# Patient Record
Sex: MALE | Race: WHITE | NOT HISPANIC OR LATINO | Employment: FULL TIME | URBAN - METROPOLITAN AREA
[De-identification: names, ages, dates, MRNs, and addresses within clinical notes are randomized per-mention and may not be internally consistent; named-entity substitution may affect disease eponyms.]

---

## 2024-10-07 NOTE — PROGRESS NOTES
"NPV     HISTORY OF PRESENT ILLNESS:   Skyler Shelton is a 44 y.o. male who is being seen today for renal mass found on abdominal US on 9/11/24 demonstrating a left 2.7 cm cystic lesion with possible peripheral solid component.   PAST MEDICAL HISTORY:  No past medical history on file.    PAST SURGICAL HISTORY:  No past surgical history on file.     ALLERGIES:   Not on File     MEDICATIONS:   No current outpatient medications     PHYSICAL EXAM:  There were no vitals taken for this visit.  Constitutional: Patient appears well-developed and well-nourished. No distress.    Pulmonary/Chest: Effort normal. No respiratory distress.   Abdominal: Soft, ND NT  : WNL  Musculoskeletal: Normal range of motion.    Neurological: Alert and oriented to person, place, and time.  Psychiatric: Normal mood and affect. Behavior is normal. Thought content normal.      Labs:  No results found for: \"TESTOSTERONE\"  No results found for: \"PSA\"  No components found for: \"CBC\"  No results found for: \"CREATININE\"  No components found for: \"TESTOTMS\"  No results found for: \"TESTF\"    Imaging: Renal CT on 9/19/24 demonstrated 3.3 cm Bosniak 4 cystic and solid mass arising from the posterior medial  interpolar right renal cortex concerning for solid renal neoplasm.  No evidence of adrenal or renal vein involvement.    2.8 cm Bosniak 2F cystic lesion arising from the superior pole of the left kidney.  No lymphadenopathy within the abdomen. (Upon reviewing imaging from [facility] with patient , right kidney was found to be totally normal and both Bosniak 4 mass and Bosniak 2F cyst were on the left kidney. We will call the radiologist and discuss this)     Discussion: Results reviewed with patient. Patient reassured. We discussed treatment options of surgery and cryoablation vs monitoring the cyst. Given size of the cyst, also told him it is safe to monitor. Alternatively suggested we can biopsy the growth. R/b/a's of each option dicussed with " patient and family. Patient elects to proceed with left partial nephrectomy, explained. R/b/a's discussed. Denies bloodthinners, denies HTN and diabetes. Patient verbalizes understanding and would like to be scheduled on     Assessment:    No diagnosis found.     NPV for left renal mass  Plan:   Schedule left open partial nephrectomy   All questions and concerns were addressed. Patient verbalizes understanding and has no other questions at this time.     Scribe Attestation  By signing my name below, I, Ludy Padilla , Scribe   attest that this documentation has been prepared under the direction and in the presence of Baldo Shannon MD.

## 2024-10-10 ENCOUNTER — OFFICE VISIT (OUTPATIENT)
Dept: UROLOGY | Facility: HOSPITAL | Age: 44
End: 2024-10-10
Payer: COMMERCIAL

## 2024-10-10 DIAGNOSIS — N28.89 LEFT RENAL MASS: Primary | ICD-10-CM

## 2024-10-10 PROCEDURE — 99204 OFFICE O/P NEW MOD 45 MIN: CPT | Performed by: UROLOGY

## 2024-10-10 PROCEDURE — G2211 COMPLEX E/M VISIT ADD ON: HCPCS | Performed by: UROLOGY

## 2024-10-10 PROCEDURE — 99214 OFFICE O/P EST MOD 30 MIN: CPT | Performed by: UROLOGY

## 2024-10-10 RX ORDER — CHLORHEXIDINE GLUCONATE 40 MG/ML
SOLUTION TOPICAL DAILY PRN
OUTPATIENT
Start: 2024-10-10

## 2024-10-10 RX ORDER — GABAPENTIN 300 MG/1
300 CAPSULE ORAL ONCE
OUTPATIENT
Start: 2024-10-10 | End: 2024-10-10

## 2024-10-10 RX ORDER — HEPARIN SODIUM 5000 [USP'U]/ML
5000 INJECTION, SOLUTION INTRAVENOUS; SUBCUTANEOUS ONCE
OUTPATIENT
Start: 2024-10-10 | End: 2024-10-10

## 2024-10-10 RX ORDER — CELECOXIB 400 MG/1
400 CAPSULE ORAL ONCE
OUTPATIENT
Start: 2024-10-10 | End: 2024-10-10

## 2024-10-10 RX ORDER — CIPROFLOXACIN 2 MG/ML
400 INJECTION, SOLUTION INTRAVENOUS ONCE
OUTPATIENT
Start: 2024-10-10 | End: 2024-10-10

## 2024-10-10 RX ORDER — ACETAMINOPHEN 325 MG/1
975 TABLET ORAL ONCE
OUTPATIENT
Start: 2024-10-10 | End: 2024-10-10

## 2024-10-10 RX ORDER — VANCOMYCIN HYDROCHLORIDE 1 G/200ML
1000 INJECTION, SOLUTION INTRAVENOUS ONCE
OUTPATIENT
Start: 2024-10-10 | End: 2024-10-10

## 2024-10-15 ENCOUNTER — HOSPITAL ENCOUNTER (OUTPATIENT)
Dept: RADIOLOGY | Facility: HOSPITAL | Age: 44
Discharge: HOME | End: 2024-10-15
Payer: COMMERCIAL

## 2024-10-15 ENCOUNTER — PRE-ADMISSION TESTING (OUTPATIENT)
Dept: PREADMISSION TESTING | Facility: HOSPITAL | Age: 44
End: 2024-10-15
Payer: COMMERCIAL

## 2024-10-15 ENCOUNTER — HOSPITAL ENCOUNTER (OUTPATIENT)
Dept: CARDIOLOGY | Facility: HOSPITAL | Age: 44
Discharge: HOME | End: 2024-10-15
Payer: COMMERCIAL

## 2024-10-15 VITALS
WEIGHT: 244.93 LBS | OXYGEN SATURATION: 98 % | HEIGHT: 68 IN | HEART RATE: 93 BPM | SYSTOLIC BLOOD PRESSURE: 117 MMHG | TEMPERATURE: 97.9 F | BODY MASS INDEX: 37.12 KG/M2 | DIASTOLIC BLOOD PRESSURE: 79 MMHG

## 2024-10-15 DIAGNOSIS — N28.89 LEFT RENAL MASS: ICD-10-CM

## 2024-10-15 LAB
ABO GROUP (TYPE) IN BLOOD: NORMAL
ANION GAP SERPL CALC-SCNC: 13 MMOL/L (ref 10–20)
ANTIBODY SCREEN: NORMAL
APPEARANCE UR: CLEAR
APTT PPP: 31 SECONDS (ref 27–38)
BILIRUB UR STRIP.AUTO-MCNC: NEGATIVE MG/DL
BUN SERPL-MCNC: 14 MG/DL (ref 6–23)
CALCIUM SERPL-MCNC: 10.1 MG/DL (ref 8.6–10.6)
CHLORIDE SERPL-SCNC: 99 MMOL/L (ref 98–107)
CO2 SERPL-SCNC: 29 MMOL/L (ref 21–32)
COLOR UR: NORMAL
CREAT SERPL-MCNC: 1.01 MG/DL (ref 0.5–1.3)
EGFRCR SERPLBLD CKD-EPI 2021: >90 ML/MIN/1.73M*2
ERYTHROCYTE [DISTWIDTH] IN BLOOD BY AUTOMATED COUNT: 12.4 % (ref 11.5–14.5)
GLUCOSE SERPL-MCNC: 88 MG/DL (ref 74–99)
GLUCOSE UR STRIP.AUTO-MCNC: NORMAL MG/DL
HCT VFR BLD AUTO: 43.3 % (ref 41–52)
HGB BLD-MCNC: 14.8 G/DL (ref 13.5–17.5)
INR PPP: 0.9 (ref 0.9–1.1)
KETONES UR STRIP.AUTO-MCNC: NEGATIVE MG/DL
LEUKOCYTE ESTERASE UR QL STRIP.AUTO: NEGATIVE
MCH RBC QN AUTO: 29 PG (ref 26–34)
MCHC RBC AUTO-ENTMCNC: 34.2 G/DL (ref 32–36)
MCV RBC AUTO: 85 FL (ref 80–100)
NITRITE UR QL STRIP.AUTO: NEGATIVE
NRBC BLD-RTO: 0 /100 WBCS (ref 0–0)
PH UR STRIP.AUTO: 7 [PH]
PLATELET # BLD AUTO: 306 X10*3/UL (ref 150–450)
POTASSIUM SERPL-SCNC: 3.9 MMOL/L (ref 3.5–5.3)
PROT UR STRIP.AUTO-MCNC: NEGATIVE MG/DL
PROTHROMBIN TIME: 10.6 SECONDS (ref 9.8–12.8)
RBC # BLD AUTO: 5.11 X10*6/UL (ref 4.5–5.9)
RBC # UR STRIP.AUTO: NEGATIVE /UL
RH FACTOR (ANTIGEN D): NORMAL
SODIUM SERPL-SCNC: 137 MMOL/L (ref 136–145)
SP GR UR STRIP.AUTO: 1.01
UROBILINOGEN UR STRIP.AUTO-MCNC: NORMAL MG/DL
WBC # BLD AUTO: 7.1 X10*3/UL (ref 4.4–11.3)

## 2024-10-15 PROCEDURE — 85027 COMPLETE CBC AUTOMATED: CPT

## 2024-10-15 PROCEDURE — 81003 URINALYSIS AUTO W/O SCOPE: CPT

## 2024-10-15 PROCEDURE — 80048 BASIC METABOLIC PNL TOTAL CA: CPT

## 2024-10-15 PROCEDURE — 93010 ELECTROCARDIOGRAM REPORT: CPT | Performed by: INTERNAL MEDICINE

## 2024-10-15 PROCEDURE — 71046 X-RAY EXAM CHEST 2 VIEWS: CPT

## 2024-10-15 PROCEDURE — 99204 OFFICE O/P NEW MOD 45 MIN: CPT | Performed by: NURSE PRACTITIONER

## 2024-10-15 PROCEDURE — 36415 COLL VENOUS BLD VENIPUNCTURE: CPT

## 2024-10-15 PROCEDURE — 71046 X-RAY EXAM CHEST 2 VIEWS: CPT | Performed by: STUDENT IN AN ORGANIZED HEALTH CARE EDUCATION/TRAINING PROGRAM

## 2024-10-15 PROCEDURE — 85610 PROTHROMBIN TIME: CPT

## 2024-10-15 PROCEDURE — 93005 ELECTROCARDIOGRAM TRACING: CPT

## 2024-10-15 PROCEDURE — 86901 BLOOD TYPING SEROLOGIC RH(D): CPT

## 2024-10-15 RX ORDER — LOSARTAN POTASSIUM 100 MG/1
100 TABLET ORAL DAILY
COMMUNITY

## 2024-10-15 RX ORDER — DULOXETIN HYDROCHLORIDE 30 MG/1
30 CAPSULE, DELAYED RELEASE ORAL DAILY
COMMUNITY

## 2024-10-15 RX ORDER — HYDROCHLOROTHIAZIDE 25 MG/1
25 TABLET ORAL DAILY
COMMUNITY

## 2024-10-15 RX ORDER — TOPIRAMATE 25 MG/1
25 TABLET ORAL DAILY
COMMUNITY

## 2024-10-15 ASSESSMENT — DUKE ACTIVITY SCORE INDEX (DASI)
CAN YOU RUN A SHORT DISTANCE: YES
CAN YOU WALK A BLOCK OR TWO ON LEVEL GROUND: YES
CAN YOU DO YARD WORK LIKE RAKING LEAVES, WEEDING OR PUSHING A MOWER: YES
CAN YOU PARTICIPATE IN STRENOUS SPORTS LIKE SWIMMING, SINGLES TENNIS, FOOTBALL, BASKETBALL, OR SKIING: YES
CAN YOU DO MODERATE WORK AROUND THE HOUSE LIKE VACUUMING, SWEEPING FLOORS OR CARRYING GROCERIES: YES
DASI METS SCORE: 9.9
CAN YOU WALK INDOORS, SUCH AS AROUND YOUR HOUSE: YES
CAN YOU PARTICIPATE IN MODERATE RECREATIONAL ACTIVITIES LIKE GOLF, BOWLING, DANCING, DOUBLES TENNIS OR THROWING A BASEBALL OR FOOTBALL: YES
CAN YOU DO HEAVY WORK AROUND THE HOUSE LIKE SCRUBBING FLOORS OR LIFTING AND MOVING HEAVY FURNITURE: YES
CAN YOU TAKE CARE OF YOURSELF (EAT, DRESS, BATHE, OR USE TOILET): YES
CAN YOU CLIMB A FLIGHT OF STAIRS OR WALK UP A HILL: YES
TOTAL_SCORE: 58.2
CAN YOU HAVE SEXUAL RELATIONS: YES
CAN YOU DO LIGHT WORK AROUND THE HOUSE LIKE DUSTING OR WASHING DISHES: YES

## 2024-10-15 ASSESSMENT — ENCOUNTER SYMPTOMS
EYES NEGATIVE: 1
ENDOCRINE NEGATIVE: 1
GASTROINTESTINAL NEGATIVE: 1
MUSCULOSKELETAL NEGATIVE: 1
CONSTITUTIONAL NEGATIVE: 1
NECK NEGATIVE: 1
RESPIRATORY NEGATIVE: 1
NEUROLOGICAL NEGATIVE: 1
CARDIOVASCULAR NEGATIVE: 1

## 2024-10-15 ASSESSMENT — LIFESTYLE VARIABLES: SMOKING_STATUS: NONSMOKER

## 2024-10-15 NOTE — H&P (VIEW-ONLY)
CPM/PAT Evaluation       Name: Skyler Shelton (Skyler Shelton)  /Age: 1980/44 y.o.     Visit Type:   In-Person       Chief Complaint: renal mass    HPI  The patient is a 44 year old male with recently found renal mass on abdominal US 24. He currently denies fever, chills, n/v, abdominal pain, chest pain, sob or changes in bowel or bladder pattern. He presents today for perioperative evaluation in anticipation of Left Open Partial Nephrectomy - Left on 10/21/24 with Dr. Shannon.    Past Medical History:   Diagnosis Date    Chronic headaches     Depression     Obesity     Sleep apnea        Past Surgical History:   Procedure Laterality Date    ANAL FISTULOTOMY      COLONOSCOPY         Patient Sexual activity questions deferred to the physician.    Family History   Problem Relation Name Age of Onset    Hypertension Mother      Hypertension Father      Heart attack Father      Breast cancer Sister         Allergies   Allergen Reactions    Penicillins Rash and Unknown       Prior to Admission medications    Not on File        PAT ROS:   Constitutional:   neg    Neuro/Psych:   neg    Eyes:   neg    Ears:   neg    Nose:   neg    Mouth:   neg    Throat:   neg    Neck:   neg    Cardio:   neg    Respiratory:   neg    Endocrine:   neg    GI:   neg    :   neg    Musculoskeletal:   neg    Hematologic:   neg    Skin:  neg        Physical Exam  Vitals reviewed.   Constitutional:       Appearance: Normal appearance. He is obese.   HENT:      Head: Normocephalic and atraumatic.      Nose: Nose normal.      Mouth/Throat:      Mouth: Mucous membranes are moist.      Pharynx: Oropharynx is clear.   Eyes:      Extraocular Movements: Extraocular movements intact.      Pupils: Pupils are equal, round, and reactive to light.   Cardiovascular:      Rate and Rhythm: Normal rate and regular rhythm.      Pulses: Normal pulses.      Heart sounds: Normal heart sounds.   Pulmonary:      Effort: Pulmonary effort is normal.      Breath  "sounds: Normal breath sounds.   Musculoskeletal:         General: Normal range of motion.      Cervical back: Normal range of motion.   Skin:     General: Skin is warm and dry.   Neurological:      General: No focal deficit present.      Mental Status: He is alert and oriented to person, place, and time.   Psychiatric:         Mood and Affect: Mood normal.         Behavior: Behavior normal.          PAT AIRWAY:   Airway:     Mallampati::  IV    TM distance::  >3 FB    Neck ROM::  Full  normal        Visit Vitals  /79   Pulse 93   Temp 36.6 °C (97.9 °F)   Ht 1.727 m (5' 8\")   Wt 111 kg (244 lb 14.9 oz)   SpO2 98%   BMI 37.24 kg/m²   Smoking Status Never   BSA 2.31 m²          DASI Risk Score      Flowsheet Row Pre-Admission Testing [facility] 10/15/[facility] in [facility]   Can you take care of yourself (eat, dress, bathe, or use toilet)?  2.75 filed at 10/15/2024 1011   Can you walk indoors, such as around your house? 1.75 filed at 10/15/2024 1011   Can you walk a block or two on level ground?  2.75 filed at 10/15/2024 1011   Can you climb a flight of stairs or walk up a hill? 5.5 filed at 10/15/2024 1011   Can you run a short distance? 8 filed at 10/15/2024 1011   Can you do light work around the house like dusting or washing dishes? 2.7 filed at 10/15/2024 1011   Can you do moderate work around the house like vacuuming, sweeping floors or carrying groceries? 3.5 filed at 10/15/2024 1011   Can you do heavy work around the house like scrubbing floors or lifting and moving heavy furniture?  8 filed at 10/15/2024 1011   Can you do yard work like raking leaves, weeding or pushing a mower? 4.5 filed at 10/15/2024 1011   Can you have sexual relations? 5.25 filed at 10/15/2024 1011   Can you participate in moderate recreational activities like golf, bowling, dancing, doubles tennis or throwing a baseball or football? 6 filed at 10/15/2024 1011   Can you participate in strenous sports like swimming, singles " tennis, football, basketball, or skiing? 7.5 filed at 10/15/2024 1011   DASI SCORE 58.2 filed at 10/15/2024 1011   METS Score (Will be calculated only when all the questions are answered) 9.9 filed at 10/15/2024 1011          Caprini DVT Assessment      Flowsheet Row Pre-Admission Testing from 10/15/2024 in [facility]   DVT Score 5 filed at 10/15/2024 1039   Medical Factors Present cancer, chemotherapy, or previous malignancy filed at 10/15/2024 1039   BMI 31-40 (Obesity) filed at 10/15/2024 1039          Modified Frailty Index      Flowsheet Row Pre-Admission Testing from 10/15/2024 in [facility]   Non-independent functional status (problems with dressing, bathing, personal grooming, or cooking) 0 filed at 10/15/2024 1039   History of diabetes mellitus  0 filed at 10/15/2024 1039   History of COPD 0 filed at 10/15/2024 1039   History of CHF No filed at 10/15/2024 1039   History of MI 0 filed at 10/15/2024 1039   History of Percutaneous Coronary Intervention, Cardiac Surgery, or Angina No filed at 10/15/2024 1039   Hypertension requiring the use of medication  0.0909 filed at 10/15/2024 1039   Peripheral vascular disease 0 filed at 10/15/2024 1039   Impaired sensorium (cognitive impairement or loss, clouding, or delirium) 0 filed at 10/15/2024 1039   TIA or CVA withouy residual deficit 0 filed at 10/15/2024 1039   Cerebrovascular accident with deficit 0 filed at 10/15/2024 1039   Modified Frailty Index Calculator .0909 filed at 10/15/2024 1039          CHADS2 Stroke Risk  Current as of 7 hours ago        N/A 3 to 100%: High Risk   2 to < 3%: Medium Risk   0 to < 2%: Low Risk     Last Change: N/A          This score determines the patient's risk of having a stroke if the patient has atrial fibrillation.        This score is not applicable to this patient. Components are not calculated.          Revised Cardiac Risk Index      Flowsheet Row Pre-Admission Testing from 10/15/2024 in   [facility]   High-Risk Surgery (Intraperitoneal, Intrathoracic,Suprainguinal vascular) 0 filed at 10/15/2024 1039   History of ischemic heart disease (History of MI, History of positive exercuse test, Current chest paint considered due to myocardial ischemia, Use of nitrate therapy, ECG with pathological Q Waves) 0 filed at 10/15/2024 1039   History of congestive heart failure (pulmonary edemia, bilateral rales or S3 gallop, Paroxysmal nocturnal dyspnea, CXR showing pulmonary vascular redistribution) 0 filed at 10/15/2024 1039   History of cerebrovascular disease (Prior TIA or stroke) 0 filed at 10/15/2024 1039   Pre-operative insulin treatment 0 filed at 10/15/2024 1039   Pre-operative creatinine>2 mg/dl 0 filed at 10/15/2024 1039   Revised Cardiac Risk Calculator 0 filed at 10/15/2024 1039          Apfel Simplified Score      Flowsheet Row Pre-Admission Testing from 10/15/2024 in [facility]   Smoking status 1 filed at 10/15/2024 1039   History of motion sickness or PONV  0 filed at 10/15/2024 1039   Use of postoperative opioids 1 filed at 10/15/2024 1039   Gender - Female 0=No filed at 10/15/2024 1039   Apfel Simplified Score Calculator 2 filed at 10/15/2024 1039          Risk Analysis Index Results This Encounter    No data found in the last 10 encounters.       Stop Bang Score      Flowsheet Row Pre-Admission Testing from 10/15/2024 in [facility]   Do you snore loudly? 0 filed at 10/15/2024 1011   Do you often feel tired or fatigued after your sleep? 0 filed at 10/15/2024 1011   Has anyone ever observed you stop breathing in your sleep? 0 filed at 10/15/2024 1011   Do you have or are you being treated for high blood pressure? 1 filed at 10/15/2024 1011   Recent BMI (Calculated) 0 filed at 10/15/2024 1011   Age older than 50 years old? 0=No filed at 10/15/2024 1011   Is your neck circumference greater than 17 inches (Male) or 16 inches (Female)? 0 filed at  10/15/2024 1011   Gender - Male 1=Yes filed at 10/15/2024 1011          Recent Results (from the past 2 weeks)   Basic Metabolic Panel    Collection Time: 10/15/24 10:41 AM   Result Value Ref Range    Glucose 88 74 - 99 mg/dL    Sodium 137 136 - 145 mmol/L    Potassium 3.9 3.5 - 5.3 mmol/L    Chloride 99 98 - 107 mmol/L    Bicarbonate 29 21 - 32 mmol/L    Anion Gap 13 10 - 20 mmol/L    Urea Nitrogen 14 6 - 23 mg/dL    Creatinine 1.01 0.50 - 1.30 mg/dL    eGFR >90 >60 mL/min/1.73m*2    Calcium 10.1 8.6 - 10.6 mg/dL   CBC    Collection Time: 10/15/24 10:41 AM   Result Value Ref Range    WBC 7.1 4.4 - 11.3 x10*3/uL    nRBC 0.0 0.0 - 0.0 /100 WBCs    RBC 5.11 4.50 - 5.90 x10*6/uL    Hemoglobin 14.8 13.5 - 17.5 g/dL    Hematocrit 43.3 41.0 - 52.0 %    MCV 85 80 - 100 fL    MCH 29.0 26.0 - 34.0 pg    MCHC 34.2 32.0 - 36.0 g/dL    RDW 12.4 11.5 - 14.5 %    Platelets 306 150 - 450 x10*3/uL   Coagulation Screen    Collection Time: 10/15/24 10:41 AM   Result Value Ref Range    Protime 10.6 9.8 - 12.8 seconds    INR 0.9 0.9 - 1.1    aPTT 31 27 - 38 seconds   Type And Screen    Collection Time: 10/15/24 10:41 AM   Result Value Ref Range    ABO TYPE AB     Rh TYPE NEG     ANTIBODY SCREEN NEG    Urinalysis with Reflex Culture and Microscopic    Collection Time: 10/15/24 10:41 AM   Result Value Ref Range    Color, Urine Light-Yellow Light-Yellow, Yellow, Dark-Yellow    Appearance, Urine Clear Clear    Specific Gravity, Urine 1.013 1.005 - 1.035    pH, Urine 7.0 5.0, 5.5, 6.0, 6.5, 7.0, 7.5, 8.0    Protein, Urine NEGATIVE NEGATIVE, 10 (TRACE), 20 (TRACE) mg/dL    Glucose, Urine Normal Normal mg/dL    Blood, Urine NEGATIVE NEGATIVE    Ketones, Urine NEGATIVE NEGATIVE mg/dL    Bilirubin, Urine NEGATIVE NEGATIVE    Urobilinogen, Urine Normal Normal mg/dL    Nitrite, Urine NEGATIVE NEGATIVE    Leukocyte Esterase, Urine NEGATIVE NEGATIVE   Extra Urine Gray Tube    Collection Time: 10/15/24 10:41 AM   Result Value Ref Range    Extra Tube  Hold for add-ons.         Diagnostic Results    EKG 10/15/24  Normal sinus rhythm  Normal ECG  No previous ECGs available    CXRAY 10/15/24  IMPRESSION:  1. No evidence of acute cardiopulmonary process.    CT cardiac scoring 8/23/23  Impression  1. Coronary artery calcium score of 0 *.  2. Additional findings as above.  Stress ECG Report 12/23/19  STRESS ECG SUMMARY:   The resting blood pressure was 124/86 mmHg. The patient exercised according to the    Ricardo protocol. Total exercise time was 9 minutes and 50 seconds. The maximum   heart rate was 181 bpm, which is 101% predicted for age. METs achieved was 11.1.   ECG Response: Normal   No significant ST changes during exercise or recovery. Pt denied chest symptoms   throughout testing. Severe SOB at peak exercise.     TTE 12/20/19  CONCLUSIONS:   - Exam indication: Shortness of Breath   - The left ventricle is normal in size. There is no left ventricular hypertrophy.   Left ventricular systolic function is normal. EF = 56 ± 5% (2D biplane) Normal   left ventricular diastolic function.   - The right ventricle is normal in size. Right ventricular systolic function is   normal.   - There are no significant valvular abnormalities.   - The patient has not had a prior CC echocardiographic exam for comparison.     Assessment and Plan:     Anesthesia:  The patient denies problems with anesthesia in the past such as PONV, prolonged sedation, awareness, dental damage, aspiration, cardiac arrest, difficult intubation, or unexpected hospital admissions.     Cardiovascular  #HTN-managed on managed on hydrochlorothiazide-continue as prescribed in the perioperative period, losartan-instructed to hold 24 hours prior to surgery.  #HLD-no current medications.  He is scheduled for non-cardiac surgery associated with elevated risk. The patient has no major cardiac contraindications to non- cardiac surgery.  Cardiology Evaluation  The patient is not followed by cardiology.  METS  The  patient's functional capacity capacity is greater than 4 METS.  RCRI  The patient meets 0 RCRI criteria and therefor has a 3.9% risk of major adverse cardiac complications.  BARRERA score which indicates a 0% risk of intraoperative or 30-day postoperative MACE (major adverse cardiac event).    Pulmonary   #YI-uses CPAP machine  The patient is at increased risk of perioperative pulmonary complications secondary to YI, morbid obesity. Patient educated to bring CPAP/BIPAP day of surgery.     Recommend prioritizing non opioid analgesic techniques (regional and local anesthesia, nonsteroidal medications, etc) before the administration of opioids and close monitoring for hypoventilation after surgery due to YI/supsected YI. If intravenous narcotics are needed beyond the immediate samantha-operative period, the patient may benefit from continuous pulse oximetry to monitor for hypoxic events till baseline Sp02 is normal on room air and  a respiratory therapy evaluation.    ARISCAT 31, Intermediate, 13.3% risk of in-hospital postoperative pulmonary complications  PRODIGY 11, intermediate risk of respiratory depression episode. Patient given PI sheet for preoperative deep breathing exercises.    Neuro:   #Depression-managed on Cymbalta-continue as prescribed in the perioperative period   #Chronic headaches-managed on Topamax-continue as prescribed in the perioperative period   The patient is at increased risk for postoperative delirium secondary to depression. The patient is at increased risk for perioperative stroke secondary to hypertension , hyperlipidemia, general anesthesia. Handouts for preoperative brain exercises given to patient.    HEENT/Airway  No diagnoses, significant findings on chart review, clinical presentation, or evaluation. The patient is at increased risk of airway difficulty secondary to obesity, short thick neck. No documented or reported history of airway difficulty.     Endocrine  No diagnoses or  significant findings on chart review or clinical presentation and evaluation.    Gastrointestinal  No diagnoses or significant findings on chart review or clinical presentation and evaluation.    Eat 10- 0,  self-perceived oropharyngeal dysphagia scale (0-40)     Genitourinary  No diagnoses or significant findings on chart review or clinical presentation and evaluation.    Renal  #Left renal mass-scheduled for surgery with Dr. Shannon 10/21/24.  The patient has specific risk factors associated with increased risk of perioperative renal complications related to male gender, hypertension. Preventative measures include preoperative hydration.    Hematology  No diagnoses or significant findings on chart review or clinical presentation and evaluation.    Caprini score 5, high risk of perioperative VTE.     Patient instructed to ambulate as soon as possible postoperatively to decrease thromboembolic risk. Initiate mechanical DVT prophylaxis as soon as possible and initiate chemical prophylaxis when deemed safe from a bleeding standpoint post surgery.     Transfusion Evaluation  A type and screen was obtained given the likelihood for perioperative transfusion of blood or blood products.    Musculoskeletal  No diagnoses or significant findings on chart review or clinical presentation and evaluation.    ID  No diagnoses or significant findings on chart review or clinical presentation and evaluation.    -Preoperative medication instructions were provided and reviewed with the patient.  Any additional testing or evaluation was explained to the patient.  NPO Instructions were discussed, and the patient's questions were answered prior to conclusion of this encounter. Patient verbalized understanding of preoperative instructions. After Visit Summary given.

## 2024-10-15 NOTE — CPM/PAT H&P
CPM/PAT Evaluation       Name: Skyler Shelton (Skyler Shelton)  /Age: 1980/44 y.o.     Visit Type:   In-Person       Chief Complaint: renal mass    HPI  The patient is a 44 year old male with recently found renal mass on abdominal US 24. He currently denies fever, chills, n/v, abdominal pain, chest pain, sob or changes in bowel or bladder pattern. He presents today for perioperative evaluation in anticipation of Left Open Partial Nephrectomy - Left on 10/21/24 with Dr. Shannon.    Past Medical History:   Diagnosis Date    Chronic headaches     Depression     Obesity     Sleep apnea        Past Surgical History:   Procedure Laterality Date    ANAL FISTULOTOMY      COLONOSCOPY         Patient Sexual activity questions deferred to the physician.    Family History   Problem Relation Name Age of Onset    Hypertension Mother      Hypertension Father      Heart attack Father      Breast cancer Sister         Allergies   Allergen Reactions    Penicillins Rash and Unknown       Prior to Admission medications    Not on File        PAT ROS:   Constitutional:   neg    Neuro/Psych:   neg    Eyes:   neg    Ears:   neg    Nose:   neg    Mouth:   neg    Throat:   neg    Neck:   neg    Cardio:   neg    Respiratory:   neg    Endocrine:   neg    GI:   neg    :   neg    Musculoskeletal:   neg    Hematologic:   neg    Skin:  neg        Physical Exam  Vitals reviewed.   Constitutional:       Appearance: Normal appearance. He is obese.   HENT:      Head: Normocephalic and atraumatic.      Nose: Nose normal.      Mouth/Throat:      Mouth: Mucous membranes are moist.      Pharynx: Oropharynx is clear.   Eyes:      Extraocular Movements: Extraocular movements intact.      Pupils: Pupils are equal, round, and reactive to light.   Cardiovascular:      Rate and Rhythm: Normal rate and regular rhythm.      Pulses: Normal pulses.      Heart sounds: Normal heart sounds.   Pulmonary:      Effort: Pulmonary effort is normal.      Breath  "sounds: Normal breath sounds.   Musculoskeletal:         General: Normal range of motion.      Cervical back: Normal range of motion.   Skin:     General: Skin is warm and dry.   Neurological:      General: No focal deficit present.      Mental Status: He is alert and oriented to person, place, and time.   Psychiatric:         Mood and Affect: Mood normal.         Behavior: Behavior normal.          PAT AIRWAY:   Airway:     Mallampati::  IV    TM distance::  >3 FB    Neck ROM::  Full  normal        Visit Vitals  /79   Pulse 93   Temp 36.6 °C (97.9 °F)   Ht 1.727 m (5' 8\")   Wt 111 kg (244 lb 14.9 oz)   SpO2 98%   BMI 37.24 kg/m²   Smoking Status Never   BSA 2.31 m²          DASI Risk Score      Flowsheet Row Pre-Admission Testing from 10/15/2024 in [facility]   Can you take care of yourself (eat, dress, bathe, or use toilet)?  2.75 filed at 10/15/2024 1011   Can you walk indoors, such as around your house? 1.75 filed at 10/15/2024 1011   Can you walk a block or two on level ground?  2.75 filed at 10/15/2024 1011   Can you climb a flight of stairs or walk up a hill? 5.5 filed at 10/15/2024 1011   Can you run a short distance? 8 filed at 10/15/2024 1011   Can you do light work around the house like dusting or washing dishes? 2.7 filed at 10/15/2024 1011   Can you do moderate work around the house like vacuuming, sweeping floors or carrying groceries? 3.5 filed at 10/15/2024 1011   Can you do heavy work around the house like scrubbing floors or lifting and moving heavy furniture?  8 filed at 10/15/2024 1011   Can you do yard work like raking leaves, weeding or pushing a mower? 4.5 filed at 10/15/2024 1011   Can you have sexual relations? 5.25 filed at 10/15/2024 1011   Can you participate in moderate recreational activities like golf, bowling, dancing, doubles tennis or throwing a baseball or football? 6 filed at 10/15/2024 1011   Can you participate in strenous sports like swimming, singles " tennis, football, basketball, or skiing? 7.5 filed at 10/15/2024 1011   DASI SCORE 58.2 filed at 10/15/2024 1011   METS Score (Will be calculated only when all the questions are answered) 9.9 filed at 10/15/2024 1011          Caprini DVT Assessment      Flowsheet Row Pre-Admission Testing from 10/15/2024 in [facility]   DVT Score 5 filed at 10/15/2024 1039   Medical Factors Present cancer, chemotherapy, or previous malignancy filed at 10/15/2024 1039   BMI 31-40 (Obesity) filed at 10/15/2024 1039          Modified Frailty Index      Flowsheet Row Pre-Admission Testing from 10/15/2024 in [facility]   Non-independent functional status (problems with dressing, bathing, personal grooming, or cooking) 0 filed at 10/15/2024 1039   History of diabetes mellitus  0 filed at 10/15/2024 1039   History of COPD 0 filed at 10/15/2024 1039   History of CHF No filed at 10/15/2024 1039   History of MI 0 filed at 10/15/2024 1039   History of Percutaneous Coronary Intervention, Cardiac Surgery, or Angina No filed at 10/15/2024 1039   Hypertension requiring the use of medication  0.0909 filed at 10/15/2024 1039   Peripheral vascular disease 0 filed at 10/15/2024 1039   Impaired sensorium (cognitive impairement or loss, clouding, or delirium) 0 filed at 10/15/2024 1039   TIA or CVA withouy residual deficit 0 filed at 10/15/2024 1039   Cerebrovascular accident with deficit 0 filed at 10/15/2024 1039   Modified Frailty Index Calculator .0909 filed at 10/15/2024 1039          CHADS2 Stroke Risk  Current as of 7 hours ago        N/A 3 to 100%: High Risk   2 to < 3%: Medium Risk   0 to < 2%: Low Risk     Last Change: N/A          This score determines the patient's risk of having a stroke if the patient has atrial fibrillation.        This score is not applicable to this patient. Components are not calculated.          Revised Cardiac Risk Index      Flowsheet Row Pre-Admission Testing from 10/15/2024 in   [facility]   High-Risk Surgery (Intraperitoneal, Intrathoracic,Suprainguinal vascular) 0 filed at 10/15/2024 1039   History of ischemic heart disease (History of MI, History of positive exercuse test, Current chest paint considered due to myocardial ischemia, Use of nitrate therapy, ECG with pathological Q Waves) 0 filed at 10/15/2024 1039   History of congestive heart failure (pulmonary edemia, bilateral rales or S3 gallop, Paroxysmal nocturnal dyspnea, CXR showing pulmonary vascular redistribution) 0 filed at 10/15/2024 1039   History of cerebrovascular disease (Prior TIA or stroke) 0 filed at 10/15/2024 1039   Pre-operative insulin treatment 0 filed at 10/15/2024 1039   Pre-operative creatinine>2 mg/dl 0 filed at 10/15/2024 1039   Revised Cardiac Risk Calculator 0 filed at 10/15/2024 1039          Apfel Simplified Score      Flowsheet Row Pre-Admission Testing from 10/15/2024 in [facility]   Smoking status 1 filed at 10/15/2024 1039   History of motion sickness or PONV  0 filed at 10/15/2024 1039   Use of postoperative opioids 1 filed at 10/15/2024 1039   Gender - Female 0=No filed at 10/15/2024 1039   Apfel Simplified Score Calculator 2 filed at 10/15/2024 1039          Risk Analysis Index Results This Encounter    No data found in the last 10 encounters.       Stop Bang Score      Flowsheet Row Pre-Admission Testing from 10/15/2024 in [facility]   Do you snore loudly? 0 filed at 10/15/2024 1011   Do you often feel tired or fatigued after your sleep? 0 filed at 10/15/2024 1011   Has anyone ever observed you stop breathing in your sleep? 0 filed at 10/15/2024 1011   Do you have or are you being treated for high blood pressure? 1 filed at 10/15/2024 1011   Recent BMI (Calculated) 0 filed at 10/15/2024 1011   Age older than 50 years old? 0=No filed at 10/15/2024 1011   Is your neck circumference greater than 17 inches (Male) or 16 inches (Female)? 0 filed at  10/15/2024 1011   Gender - Male 1=Yes filed at 10/15/2024 1011          Recent Results (from the past 2 weeks)   Basic Metabolic Panel    Collection Time: 10/15/24 10:41 AM   Result Value Ref Range    Glucose 88 74 - 99 mg/dL    Sodium 137 136 - 145 mmol/L    Potassium 3.9 3.5 - 5.3 mmol/L    Chloride 99 98 - 107 mmol/L    Bicarbonate 29 21 - 32 mmol/L    Anion Gap 13 10 - 20 mmol/L    Urea Nitrogen 14 6 - 23 mg/dL    Creatinine 1.01 0.50 - 1.30 mg/dL    eGFR >90 >60 mL/min/1.73m*2    Calcium 10.1 8.6 - 10.6 mg/dL   CBC    Collection Time: 10/15/24 10:41 AM   Result Value Ref Range    WBC 7.1 4.4 - 11.3 x10*3/uL    nRBC 0.0 0.0 - 0.0 /100 WBCs    RBC 5.11 4.50 - 5.90 x10*6/uL    Hemoglobin 14.8 13.5 - 17.5 g/dL    Hematocrit 43.3 41.0 - 52.0 %    MCV 85 80 - 100 fL    MCH 29.0 26.0 - 34.0 pg    MCHC 34.2 32.0 - 36.0 g/dL    RDW 12.4 11.5 - 14.5 %    Platelets 306 150 - 450 x10*3/uL   Coagulation Screen    Collection Time: 10/15/24 10:41 AM   Result Value Ref Range    Protime 10.6 9.8 - 12.8 seconds    INR 0.9 0.9 - 1.1    aPTT 31 27 - 38 seconds   Type And Screen    Collection Time: 10/15/24 10:41 AM   Result Value Ref Range    ABO TYPE AB     Rh TYPE NEG     ANTIBODY SCREEN NEG    Urinalysis with Reflex Culture and Microscopic    Collection Time: 10/15/24 10:41 AM   Result Value Ref Range    Color, Urine Light-Yellow Light-Yellow, Yellow, Dark-Yellow    Appearance, Urine Clear Clear    Specific Gravity, Urine 1.013 1.005 - 1.035    pH, Urine 7.0 5.0, 5.5, 6.0, 6.5, 7.0, 7.5, 8.0    Protein, Urine NEGATIVE NEGATIVE, 10 (TRACE), 20 (TRACE) mg/dL    Glucose, Urine Normal Normal mg/dL    Blood, Urine NEGATIVE NEGATIVE    Ketones, Urine NEGATIVE NEGATIVE mg/dL    Bilirubin, Urine NEGATIVE NEGATIVE    Urobilinogen, Urine Normal Normal mg/dL    Nitrite, Urine NEGATIVE NEGATIVE    Leukocyte Esterase, Urine NEGATIVE NEGATIVE   Extra Urine Gray Tube    Collection Time: 10/15/24 10:41 AM   Result Value Ref Range    Extra Tube  Hold for add-ons.         Diagnostic Results    EKG 10/15/24  Normal sinus rhythm  Normal ECG  No previous ECGs available    CXRAY 10/15/24  IMPRESSION:  1. No evidence of acute cardiopulmonary process.    CT cardiac scoring 8/23/23  Impression  1. Coronary artery calcium score of 0 *.  2. Additional findings as above.  Stress ECG Report 12/23/19  STRESS ECG SUMMARY:   The resting blood pressure was 124/86 mmHg. The patient exercised according to the    Ricardo protocol. Total exercise time was 9 minutes and 50 seconds. The maximum   heart rate was 181 bpm, which is 101% predicted for age. METs achieved was 11.1.   ECG Response: Normal   No significant ST changes during exercise or recovery. Pt denied chest symptoms   throughout testing. Severe SOB at peak exercise.     TTE 12/20/19  CONCLUSIONS:   - Exam indication: Shortness of Breath   - The left ventricle is normal in size. There is no left ventricular hypertrophy.   Left ventricular systolic function is normal. EF = 56 ± 5% (2D biplane) Normal   left ventricular diastolic function.   - The right ventricle is normal in size. Right ventricular systolic function is   normal.   - There are no significant valvular abnormalities.   - The patient has not had a prior CC echocardiographic exam for comparison.     Assessment and Plan:     Anesthesia:  The patient denies problems with anesthesia in the past such as PONV, prolonged sedation, awareness, dental damage, aspiration, cardiac arrest, difficult intubation, or unexpected hospital admissions.     Cardiovascular  #HTN-managed on managed on hydrochlorothiazide-continue as prescribed in the perioperative period, losartan-instructed to hold 24 hours prior to surgery.  #HLD-no current medications.  He is scheduled for non-cardiac surgery associated with elevated risk. The patient has no major cardiac contraindications to non- cardiac surgery.  Cardiology Evaluation  The patient is not followed by cardiology.  METS  The  patient's functional capacity capacity is greater than 4 METS.  RCRI  The patient meets 0 RCRI criteria and therefor has a 3.9% risk of major adverse cardiac complications.  BARRERA score which indicates a 0% risk of intraoperative or 30-day postoperative MACE (major adverse cardiac event).    Pulmonary   #YI-uses CPAP machine  The patient is at increased risk of perioperative pulmonary complications secondary to YI, morbid obesity. Patient educated to bring CPAP/BIPAP day of surgery.     Recommend prioritizing non opioid analgesic techniques (regional and local anesthesia, nonsteroidal medications, etc) before the administration of opioids and close monitoring for hypoventilation after surgery due to YI/supsected YI. If intravenous narcotics are needed beyond the immediate samantha-operative period, the patient may benefit from continuous pulse oximetry to monitor for hypoxic events till baseline Sp02 is normal on room air and  a respiratory therapy evaluation.    ARISCAT 31, Intermediate, 13.3% risk of in-hospital postoperative pulmonary complications  PRODIGY 11, intermediate risk of respiratory depression episode. Patient given PI sheet for preoperative deep breathing exercises.    Neuro:   #Depression-managed on Cymbalta-continue as prescribed in the perioperative period   #Chronic headaches-managed on Topamax-continue as prescribed in the perioperative period   The patient is at increased risk for postoperative delirium secondary to depression. The patient is at increased risk for perioperative stroke secondary to hypertension , hyperlipidemia, general anesthesia. Handouts for preoperative brain exercises given to patient.    HEENT/Airway  No diagnoses, significant findings on chart review, clinical presentation, or evaluation. The patient is at increased risk of airway difficulty secondary to obesity, short thick neck. No documented or reported history of airway difficulty.     Endocrine  No diagnoses or  significant findings on chart review or clinical presentation and evaluation.    Gastrointestinal  No diagnoses or significant findings on chart review or clinical presentation and evaluation.    Eat 10- 0,  self-perceived oropharyngeal dysphagia scale (0-40)     Genitourinary  No diagnoses or significant findings on chart review or clinical presentation and evaluation.    Renal  #Left renal mass-scheduled for surgery with Dr. Shannon 10/21/24.  The patient has specific risk factors associated with increased risk of perioperative renal complications related to male gender, hypertension. Preventative measures include preoperative hydration.    Hematology  No diagnoses or significant findings on chart review or clinical presentation and evaluation.    Caprini score 5, high risk of perioperative VTE.     Patient instructed to ambulate as soon as possible postoperatively to decrease thromboembolic risk. Initiate mechanical DVT prophylaxis as soon as possible and initiate chemical prophylaxis when deemed safe from a bleeding standpoint post surgery.     Transfusion Evaluation  A type and screen was obtained given the likelihood for perioperative transfusion of blood or blood products.    Musculoskeletal  No diagnoses or significant findings on chart review or clinical presentation and evaluation.    ID  No diagnoses or significant findings on chart review or clinical presentation and evaluation.    -Preoperative medication instructions were provided and reviewed with the patient.  Any additional testing or evaluation was explained to the patient.  NPO Instructions were discussed, and the patient's questions were answered prior to conclusion of this encounter. Patient verbalized understanding of preoperative instructions. After Visit Summary given.

## 2024-10-15 NOTE — PREPROCEDURE INSTRUCTIONS
Fasting Guidelines    NPO Instructions:    Do not eat any food after midnight the night before your surgery/procedure.  You may have up to 13.5 ounces of clear liquids until TWO hours before your instructed arrival time to the hospital. This includes water, black tea/coffee, (no milk or cream), apple juice, and/or electrolyte drinks (Gatorade).  You may chew gum up to TWO hours before your surgery/procedure.    Additional Instructions:    Avoid herbal supplements, multivitamins and NSAIDS (non-steroidal anti-inflammatory drugs) such as Advil, Aleve, Ibuprofen, Naproxen, Excedrin, Meloxicam or Celebrex for at least 7 days prior to surgery. May take Tylenol as needed.    Avoid tobacco and alcohol products for 24 hours prior to surgery.    CONTACT SURGEON'S OFFICE IF YOU DEVELOP:  * Fever = 100.4 F   * New respiratory symptoms (e.g. cough, shortness of breath, respiratory distress, sore throat)  * Recent loss of taste or smell  *Flu like symptoms such as headache, fatigue or gastrointestinal symptoms  * You develop any open sores, shingles, burning or painful urination   AND/OR:  * You no longer wish to have the surgery.  * Any other personal circumstances change that may lead to the need to cancel or defer this surgery.  *You were admitted to any hospital within one week of your planned procedure.    Seven/Six Days before Surgery:  Review your medication instructions, stop indicated medications    Day of Surgery:  Review your medication instructions, take indicated medications  Wear comfortable loose fitting clothing  Do not use moisturizers, creams, lotions or perfume  All jewelry and valuables should be left at home    Rod Montano [facility]  [facility]  [phone]  [phone]  Email-[email or web address]      Preoperative Brain Exercises    What are brain exercises?  A brain exercise is any activity that engages your thinking (cognitive) skills.    What types of  activities are considered brain exercises?  Jigsaw puzzles, crossword puzzles, word jumble, memory games, word search, and many more.  Many can be found free online or on your phone via a mobile gucci.    Why should I do brain exercises before my surgery?  More recent research has shown brain exercise before surgery can lower the risk of postoperative delirium (confusion) which can be especially important for older adults.  Patients who did brain exercises for 5 to 10 hours the days before surgery, cut their risk of postoperative delirium in half up to 1 week after surgery.         [facility]    Preoperative Deep Breathing Exercises    Why it is important to do deep breathing exercises before my surgery?  Deep breathing exercises strengthen your breathing muscles.  This helps you to recover after your surgery and decreases the chance of breathing complications.      How are the deep breathing exercises done?  Sit straight with your back supported.  Breathe in deeply and slowly through your nose. Your lower rib cage should expand and your abdomen may move forward.  Hold that breath for 3 to 5 seconds.  Breathe out through pursed lips, slowly and completely.  Rest and repeat 10 times every hour while awake.  Rest longer if you become dizzy or lightheaded.         Patient and Family Education             Ways You Can Help Prevent Blood Clots             This handout explains some simple things you can do to help prevent blood clots.      Blood clots are blockages that can form in the body's veins. When a blood clot forms in your deep veins, it may be called a deep vein thrombosis, or DVT for short. Blood clots can happen in any part of the body where blood flows, but they are most common in the arms and legs. If a piece of a blood clot breaks free and travels to the lungs, it is called a pulmonary embolus (PE). A PE can be a very serious problem.         Being in the hospital or having surgery  can raise your chances of getting a blood clot because you may not be well enough to move around as much as you normally do.         Ways you can help prevent blood clots in the hospital         Wearing SCDs. SCDs stands for Sequential Compression Devices.   SCDs are special sleeves that wrap around your legs  They attach to a pump that fills them with air to gently squeeze your legs every few minutes.   This helps return the blood in your legs to your heart.   SCDs should only be taken off when walking or bathing.   SCDs may not be comfortable, but they can help save your life.               Wearing compression stockings - if your doctor orders them. These special snug fitting stockings gently squeeze your legs to help blood flow.       Walking. Walking helps move the blood in your legs.   If your doctor says it is ok, try walking the halls at least   5 times a day. Ask us to help you get up, so you don't fall.      Taking any blood thinning medicines your doctor orders.        Page 1 of 2     [facility]; 3/23   Ways you can help prevent blood clots at home       Wearing compression stockings - if your doctor orders them. ? Walking - to help move the blood in your legs.       Taking any blood thinning medicines your doctor orders.      Signs of a blood clot or PE      Tell your doctor or nurse know right away if you have of the problems listed below.    If you are at home, seek medical care right away. Call 911 for chest pain or problems breathing.               Signs of a blood clot (DVT) - such as pain,  swelling, redness or warmth in your arm or leg      Signs of a pulmonary embolism (PE) - such as chest     pain or feeling short of breath

## 2024-10-16 LAB — HOLD SPECIMEN: NORMAL

## 2024-10-18 RX ORDER — SILDENAFIL 50 MG/1
1 TABLET, FILM COATED ORAL AS NEEDED
COMMUNITY
Start: 2024-09-10

## 2024-10-18 RX ORDER — DULOXETIN HYDROCHLORIDE 60 MG/1
1 CAPSULE, DELAYED RELEASE ORAL
COMMUNITY
Start: 2024-09-08

## 2024-10-18 NOTE — PROGRESS NOTES
Pharmacy Medication History Review    Skyler Shelton is a 44 y.o. male who is planned to be admitted for Left renal mass. Pharmacy called the patient prior to their scheduled procedure and reviewed the patient's [id number] medications for accuracy.    Medications ADDED:  Duloxetine 60 mg  Medications CHANGED:  Duloxetine 30 mg  Topiramate  Medications REMOVED:   None    Please review updated prior to admission medication list and comments regarding how patient may be taking medications differently by going to Admission tab --> Admission Orders --> Admit Orders / Review prior to admission medications.     Preferred pharmacy, last doses of medications, and allergies to be confirmed with patient by nursing the day of procedure.     Sources used to complete the med history include OARRS, Epic Dispense History, Care Everywhere, Office Visit Progress Note 10/10/24, Patient Interview via phone (excellent historian).    Below are additional concerns with the patient's PTA list.  None to note    Dorie Greenwood, formerly [facility]  Please reach out via Secure Chat for questions

## 2024-10-19 ENCOUNTER — ANESTHESIA EVENT (OUTPATIENT)
Dept: OPERATING ROOM | Facility: HOSPITAL | Age: 44
End: 2024-10-19
Payer: COMMERCIAL

## 2024-10-20 RX ORDER — ACETAMINOPHEN 325 MG/1
975 TABLET ORAL EVERY 6 HOURS
Status: CANCELLED | OUTPATIENT
Start: 2024-10-20

## 2024-10-20 RX ORDER — HYDROCHLOROTHIAZIDE 25 MG/1
25 TABLET ORAL DAILY
Status: CANCELLED | OUTPATIENT
Start: 2024-10-21

## 2024-10-20 RX ORDER — OXYCODONE HYDROCHLORIDE 5 MG/1
10 TABLET ORAL EVERY 6 HOURS PRN
Status: CANCELLED | OUTPATIENT
Start: 2024-10-20

## 2024-10-20 RX ORDER — OXYCODONE HYDROCHLORIDE 5 MG/1
5 TABLET ORAL EVERY 6 HOURS PRN
Status: CANCELLED | OUTPATIENT
Start: 2024-10-20

## 2024-10-20 RX ORDER — TOPIRAMATE 25 MG/1
25 TABLET ORAL DAILY
Status: CANCELLED | OUTPATIENT
Start: 2024-10-21

## 2024-10-21 ENCOUNTER — HOSPITAL ENCOUNTER (INPATIENT)
Facility: HOSPITAL | Age: 44
LOS: 2 days | Discharge: HOME HEALTH | End: 2024-10-23
Attending: UROLOGY | Admitting: UROLOGY
Payer: COMMERCIAL

## 2024-10-21 ENCOUNTER — ANESTHESIA (OUTPATIENT)
Dept: OPERATING ROOM | Facility: HOSPITAL | Age: 44
End: 2024-10-21
Payer: COMMERCIAL

## 2024-10-21 DIAGNOSIS — N28.89 RENAL MASS, LEFT: Primary | ICD-10-CM

## 2024-10-21 DIAGNOSIS — N28.89 LEFT RENAL MASS: ICD-10-CM

## 2024-10-21 PROBLEM — G47.33 OSA (OBSTRUCTIVE SLEEP APNEA): Status: ACTIVE | Noted: 2024-10-21

## 2024-10-21 PROBLEM — I10 HTN (HYPERTENSION): Status: ACTIVE | Noted: 2024-10-21

## 2024-10-21 LAB
ABO GROUP (TYPE) IN BLOOD: NORMAL
ANION GAP BLDA CALCULATED.4IONS-SCNC: 12 MMO/L (ref 10–25)
ATRIAL RATE: 96 BPM
BASE EXCESS BLDA CALC-SCNC: -2 MMOL/L (ref -2–3)
BODY TEMPERATURE: 37 DEGREES CELSIUS
CA-I BLDA-SCNC: 1.17 MMOL/L (ref 1.1–1.33)
CHLORIDE BLDA-SCNC: 99 MMOL/L (ref 98–107)
GLUCOSE BLDA-MCNC: 137 MG/DL (ref 74–99)
HCO3 BLDA-SCNC: 23.7 MMOL/L (ref 22–26)
HCT VFR BLD EST: 40 % (ref 41–52)
HGB BLDA-MCNC: 13.4 G/DL (ref 13.5–17.5)
INHALED O2 CONCENTRATION: 21 %
LACTATE BLDA-SCNC: 2.1 MMOL/L (ref 0.4–2)
OXYHGB MFR BLDA: 92.8 % (ref 94–98)
P AXIS: 20 DEGREES
P OFFSET: 203 MS
P ONSET: 148 MS
PCO2 BLDA: 43 MM HG (ref 38–42)
PH BLDA: 7.35 PH (ref 7.38–7.42)
PO2 BLDA: 67 MM HG (ref 85–95)
POTASSIUM BLDA-SCNC: 3.1 MMOL/L (ref 3.5–5.3)
PR INTERVAL: 142 MS
Q ONSET: 219 MS
QRS COUNT: 16 BEATS
QRS DURATION: 86 MS
QT INTERVAL: 354 MS
QTC CALCULATION(BAZETT): 447 MS
QTC FREDERICIA: 414 MS
R AXIS: 14 DEGREES
RH FACTOR (ANTIGEN D): NORMAL
SAO2 % BLDA: 95 % (ref 94–100)
SODIUM BLDA-SCNC: 132 MMOL/L (ref 136–145)
T AXIS: 44 DEGREES
T OFFSET: 396 MS
VENTRICULAR RATE: 96 BPM

## 2024-10-21 PROCEDURE — 7100000002 HC RECOVERY ROOM TIME - EACH INCREMENTAL 1 MINUTE: Performed by: UROLOGY

## 2024-10-21 PROCEDURE — 2500000005 HC RX 250 GENERAL PHARMACY W/O HCPCS: Performed by: UROLOGY

## 2024-10-21 PROCEDURE — 2500000004 HC RX 250 GENERAL PHARMACY W/ HCPCS (ALT 636 FOR OP/ED): Performed by: STUDENT IN AN ORGANIZED HEALTH CARE EDUCATION/TRAINING PROGRAM

## 2024-10-21 PROCEDURE — 3600000004 HC OR TIME - INITIAL BASE CHARGE - PROCEDURE LEVEL FOUR: Performed by: UROLOGY

## 2024-10-21 PROCEDURE — 88307 TISSUE EXAM BY PATHOLOGIST: CPT | Performed by: PATHOLOGY

## 2024-10-21 PROCEDURE — 93005 ELECTROCARDIOGRAM TRACING: CPT

## 2024-10-21 PROCEDURE — 82435 ASSAY OF BLOOD CHLORIDE: CPT | Performed by: ANESTHESIOLOGY

## 2024-10-21 PROCEDURE — 96372 THER/PROPH/DIAG INJ SC/IM: CPT | Performed by: UROLOGY

## 2024-10-21 PROCEDURE — 2500000001 HC RX 250 WO HCPCS SELF ADMINISTERED DRUGS (ALT 637 FOR MEDICARE OP): Performed by: UROLOGY

## 2024-10-21 PROCEDURE — 88304 TISSUE EXAM BY PATHOLOGIST: CPT | Performed by: PATHOLOGY

## 2024-10-21 PROCEDURE — 3700000002 HC GENERAL ANESTHESIA TIME - EACH INCREMENTAL 1 MINUTE: Performed by: UROLOGY

## 2024-10-21 PROCEDURE — 36415 COLL VENOUS BLD VENIPUNCTURE: CPT | Performed by: STUDENT IN AN ORGANIZED HEALTH CARE EDUCATION/TRAINING PROGRAM

## 2024-10-21 PROCEDURE — 2500000004 HC RX 250 GENERAL PHARMACY W/ HCPCS (ALT 636 FOR OP/ED): Performed by: UROLOGY

## 2024-10-21 PROCEDURE — 3700000001 HC GENERAL ANESTHESIA TIME - INITIAL BASE CHARGE: Performed by: UROLOGY

## 2024-10-21 PROCEDURE — 50240 NEPHRECTOMY PARTIAL: CPT | Performed by: UROLOGY

## 2024-10-21 PROCEDURE — 7100000001 HC RECOVERY ROOM TIME - INITIAL BASE CHARGE: Performed by: UROLOGY

## 2024-10-21 PROCEDURE — 88341 IMHCHEM/IMCYTCHM EA ADD ANTB: CPT | Performed by: PATHOLOGY

## 2024-10-21 PROCEDURE — 0TB10ZZ EXCISION OF LEFT KIDNEY, OPEN APPROACH: ICD-10-PCS | Performed by: UROLOGY

## 2024-10-21 PROCEDURE — 3600000009 HC OR TIME - EACH INCREMENTAL 1 MINUTE - PROCEDURE LEVEL FOUR: Performed by: UROLOGY

## 2024-10-21 PROCEDURE — 88307 TISSUE EXAM BY PATHOLOGIST: CPT | Mod: TC,SUR | Performed by: UROLOGY

## 2024-10-21 PROCEDURE — 2720000007 HC OR 272 NO HCPCS: Performed by: UROLOGY

## 2024-10-21 PROCEDURE — 1170000001 HC PRIVATE ONCOLOGY ROOM DAILY

## 2024-10-21 PROCEDURE — 2500000004 HC RX 250 GENERAL PHARMACY W/ HCPCS (ALT 636 FOR OP/ED): Performed by: ANESTHESIOLOGY

## 2024-10-21 PROCEDURE — 88342 IMHCHEM/IMCYTCHM 1ST ANTB: CPT | Performed by: PATHOLOGY

## 2024-10-21 PROCEDURE — 37799 UNLISTED PX VASCULAR SURGERY: CPT | Performed by: ANESTHESIOLOGY

## 2024-10-21 PROCEDURE — 84132 ASSAY OF SERUM POTASSIUM: CPT | Performed by: ANESTHESIOLOGY

## 2024-10-21 PROCEDURE — 2500000005 HC RX 250 GENERAL PHARMACY W/O HCPCS: Performed by: STUDENT IN AN ORGANIZED HEALTH CARE EDUCATION/TRAINING PROGRAM

## 2024-10-21 RX ORDER — ACETAMINOPHEN 325 MG/1
975 TABLET ORAL ONCE
Status: DISCONTINUED | OUTPATIENT
Start: 2024-10-21 | End: 2024-10-21 | Stop reason: HOSPADM

## 2024-10-21 RX ORDER — MANNITOL 20 G/100ML
INJECTION, SOLUTION INTRAVENOUS AS NEEDED
Status: DISCONTINUED | OUTPATIENT
Start: 2024-10-21 | End: 2024-10-21

## 2024-10-21 RX ORDER — SIMETHICONE 80 MG
160 TABLET,CHEWABLE ORAL
Status: DISCONTINUED | OUTPATIENT
Start: 2024-10-22 | End: 2024-10-23 | Stop reason: HOSPADM

## 2024-10-21 RX ORDER — HYDROMORPHONE HYDROCHLORIDE 1 MG/ML
INJECTION, SOLUTION INTRAMUSCULAR; INTRAVENOUS; SUBCUTANEOUS AS NEEDED
Status: DISCONTINUED | OUTPATIENT
Start: 2024-10-21 | End: 2024-10-21

## 2024-10-21 RX ORDER — GABAPENTIN 300 MG/1
300 CAPSULE ORAL ONCE
Status: DISCONTINUED | OUTPATIENT
Start: 2024-10-21 | End: 2024-10-21 | Stop reason: HOSPADM

## 2024-10-21 RX ORDER — KETOROLAC TROMETHAMINE 10 MG/1
10 TABLET, FILM COATED ORAL EVERY 8 HOURS
Status: DISCONTINUED | OUTPATIENT
Start: 2024-10-21 | End: 2024-10-23 | Stop reason: HOSPADM

## 2024-10-21 RX ORDER — POLYETHYLENE GLYCOL 3350 17 G/17G
17 POWDER, FOR SOLUTION ORAL DAILY
Status: DISCONTINUED | OUTPATIENT
Start: 2024-10-21 | End: 2024-10-23 | Stop reason: HOSPADM

## 2024-10-21 RX ORDER — ACETAMINOPHEN 10 MG/ML
1000 INJECTION, SOLUTION INTRAVENOUS EVERY 6 HOURS SCHEDULED
Status: DISPENSED | OUTPATIENT
Start: 2024-10-21 | End: 2024-10-22

## 2024-10-21 RX ORDER — HYDROMORPHONE HYDROCHLORIDE 1 MG/ML
0.4 INJECTION, SOLUTION INTRAMUSCULAR; INTRAVENOUS; SUBCUTANEOUS EVERY 2 HOUR PRN
Status: DISCONTINUED | OUTPATIENT
Start: 2024-10-21 | End: 2024-10-21

## 2024-10-21 RX ORDER — LIDOCAINE 560 MG/1
1 PATCH PERCUTANEOUS; TOPICAL; TRANSDERMAL EVERY 24 HOURS
Status: DISCONTINUED | OUTPATIENT
Start: 2024-10-21 | End: 2024-10-23 | Stop reason: HOSPADM

## 2024-10-21 RX ORDER — MIDAZOLAM HYDROCHLORIDE 1 MG/ML
INJECTION INTRAMUSCULAR; INTRAVENOUS AS NEEDED
Status: DISCONTINUED | OUTPATIENT
Start: 2024-10-21 | End: 2024-10-21

## 2024-10-21 RX ORDER — SENNOSIDES 8.6 MG/1
2 TABLET ORAL 2 TIMES DAILY
Status: DISCONTINUED | OUTPATIENT
Start: 2024-10-21 | End: 2024-10-23 | Stop reason: HOSPADM

## 2024-10-21 RX ORDER — POTASSIUM CHLORIDE 14.9 MG/ML
20 INJECTION INTRAVENOUS ONCE
Status: COMPLETED | OUTPATIENT
Start: 2024-10-21 | End: 2024-10-21

## 2024-10-21 RX ORDER — HEPARIN SODIUM 5000 [USP'U]/ML
5000 INJECTION, SOLUTION INTRAVENOUS; SUBCUTANEOUS ONCE
Status: DISCONTINUED | OUTPATIENT
Start: 2024-10-21 | End: 2024-10-21 | Stop reason: HOSPADM

## 2024-10-21 RX ORDER — SODIUM CHLORIDE, SODIUM LACTATE, POTASSIUM CHLORIDE, CALCIUM CHLORIDE 600; 310; 30; 20 MG/100ML; MG/100ML; MG/100ML; MG/100ML
100 INJECTION, SOLUTION INTRAVENOUS CONTINUOUS
Status: DISCONTINUED | OUTPATIENT
Start: 2024-10-21 | End: 2024-10-23 | Stop reason: HOSPADM

## 2024-10-21 RX ORDER — ROCURONIUM BROMIDE 10 MG/ML
INJECTION, SOLUTION INTRAVENOUS AS NEEDED
Status: DISCONTINUED | OUTPATIENT
Start: 2024-10-21 | End: 2024-10-21

## 2024-10-21 RX ORDER — TOPIRAMATE 25 MG/1
25 TABLET ORAL DAILY
Status: DISCONTINUED | OUTPATIENT
Start: 2024-10-22 | End: 2024-10-23 | Stop reason: HOSPADM

## 2024-10-21 RX ORDER — DULOXETIN HYDROCHLORIDE 30 MG/1
60 CAPSULE, DELAYED RELEASE ORAL
Status: DISCONTINUED | OUTPATIENT
Start: 2024-10-22 | End: 2024-10-22

## 2024-10-21 RX ORDER — BUPIVACAINE HYDROCHLORIDE 5 MG/ML
INJECTION, SOLUTION PERINEURAL AS NEEDED
Status: DISCONTINUED | OUTPATIENT
Start: 2024-10-21 | End: 2024-10-21 | Stop reason: HOSPADM

## 2024-10-21 RX ORDER — LIDOCAINE HYDROCHLORIDE 20 MG/ML
INJECTION, SOLUTION INFILTRATION; PERINEURAL AS NEEDED
Status: DISCONTINUED | OUTPATIENT
Start: 2024-10-21 | End: 2024-10-21

## 2024-10-21 RX ORDER — METHOCARBAMOL 500 MG/1
500 TABLET, FILM COATED ORAL EVERY 6 HOURS SCHEDULED
Status: DISCONTINUED | OUTPATIENT
Start: 2024-10-22 | End: 2024-10-23 | Stop reason: HOSPADM

## 2024-10-21 RX ORDER — VANCOMYCIN HYDROCHLORIDE 1 G/200ML
1000 INJECTION, SOLUTION INTRAVENOUS ONCE
Status: DISCONTINUED | OUTPATIENT
Start: 2024-10-21 | End: 2024-10-21 | Stop reason: HOSPADM

## 2024-10-21 RX ORDER — BISACODYL 5 MG
10 TABLET, DELAYED RELEASE (ENTERIC COATED) ORAL DAILY PRN
Status: DISCONTINUED | OUTPATIENT
Start: 2024-10-21 | End: 2024-10-23 | Stop reason: HOSPADM

## 2024-10-21 RX ORDER — ONDANSETRON HYDROCHLORIDE 2 MG/ML
4 INJECTION, SOLUTION INTRAVENOUS EVERY 8 HOURS PRN
Status: DISCONTINUED | OUTPATIENT
Start: 2024-10-21 | End: 2024-10-23 | Stop reason: HOSPADM

## 2024-10-21 RX ORDER — PHENYLEPHRINE HCL IN 0.9% NACL 0.4MG/10ML
SYRINGE (ML) INTRAVENOUS AS NEEDED
Status: DISCONTINUED | OUTPATIENT
Start: 2024-10-21 | End: 2024-10-21

## 2024-10-21 RX ORDER — PROPOFOL 10 MG/ML
INJECTION, EMULSION INTRAVENOUS AS NEEDED
Status: DISCONTINUED | OUTPATIENT
Start: 2024-10-21 | End: 2024-10-21

## 2024-10-21 RX ORDER — KETOROLAC TROMETHAMINE 30 MG/ML
INJECTION, SOLUTION INTRAMUSCULAR; INTRAVENOUS AS NEEDED
Status: DISCONTINUED | OUTPATIENT
Start: 2024-10-21 | End: 2024-10-21

## 2024-10-21 RX ORDER — CHLORHEXIDINE GLUCONATE 40 MG/ML
SOLUTION TOPICAL DAILY PRN
Status: DISCONTINUED | OUTPATIENT
Start: 2024-10-21 | End: 2024-10-21 | Stop reason: HOSPADM

## 2024-10-21 RX ORDER — LIDOCAINE HYDROCHLORIDE 10 MG/ML
0.1 INJECTION, SOLUTION EPIDURAL; INFILTRATION; INTRACAUDAL; PERINEURAL ONCE
Status: DISCONTINUED | OUTPATIENT
Start: 2024-10-21 | End: 2024-10-21 | Stop reason: HOSPADM

## 2024-10-21 RX ORDER — ONDANSETRON 4 MG/1
4 TABLET, FILM COATED ORAL EVERY 8 HOURS PRN
Status: DISCONTINUED | OUTPATIENT
Start: 2024-10-21 | End: 2024-10-23 | Stop reason: HOSPADM

## 2024-10-21 RX ORDER — HYDROMORPHONE HYDROCHLORIDE 1 MG/ML
0.4 INJECTION, SOLUTION INTRAMUSCULAR; INTRAVENOUS; SUBCUTANEOUS
Status: DISCONTINUED | OUTPATIENT
Start: 2024-10-21 | End: 2024-10-21 | Stop reason: HOSPADM

## 2024-10-21 RX ORDER — PHENYLEPHRINE 10 MG/250 ML(40 MCG/ML)IN 0.9 % SOD.CHLORIDE INTRAVENOUS
CONTINUOUS PRN
Status: DISCONTINUED | OUTPATIENT
Start: 2024-10-21 | End: 2024-10-21

## 2024-10-21 RX ORDER — CELECOXIB 200 MG/1
400 CAPSULE ORAL ONCE
Status: DISCONTINUED | OUTPATIENT
Start: 2024-10-21 | End: 2024-10-21 | Stop reason: HOSPADM

## 2024-10-21 RX ORDER — ACETAMINOPHEN 325 MG/1
650 TABLET ORAL EVERY 4 HOURS PRN
Status: DISCONTINUED | OUTPATIENT
Start: 2024-10-21 | End: 2024-10-21 | Stop reason: HOSPADM

## 2024-10-21 RX ORDER — METHOCARBAMOL 100 MG/ML
1000 INJECTION, SOLUTION INTRAMUSCULAR; INTRAVENOUS ONCE
Status: COMPLETED | OUTPATIENT
Start: 2024-10-21 | End: 2024-10-21

## 2024-10-21 RX ORDER — CEFAZOLIN 1 G/1
INJECTION, POWDER, FOR SOLUTION INTRAVENOUS AS NEEDED
Status: DISCONTINUED | OUTPATIENT
Start: 2024-10-21 | End: 2024-10-21

## 2024-10-21 RX ORDER — SODIUM CHLORIDE 0.9 G/100ML
IRRIGANT IRRIGATION AS NEEDED
Status: DISCONTINUED | OUTPATIENT
Start: 2024-10-21 | End: 2024-10-21 | Stop reason: HOSPADM

## 2024-10-21 RX ORDER — DEXAMETHASONE SODIUM PHOSPHATE 10 MG/ML
INJECTION INTRAMUSCULAR; INTRAVENOUS AS NEEDED
Status: DISCONTINUED | OUTPATIENT
Start: 2024-10-21 | End: 2024-10-21 | Stop reason: HOSPADM

## 2024-10-21 RX ORDER — HYDROMORPHONE HYDROCHLORIDE 1 MG/ML
0.2 INJECTION, SOLUTION INTRAMUSCULAR; INTRAVENOUS; SUBCUTANEOUS EVERY 4 HOURS PRN
Status: DISCONTINUED | OUTPATIENT
Start: 2024-10-21 | End: 2024-10-23 | Stop reason: HOSPADM

## 2024-10-21 RX ORDER — HYDROCHLOROTHIAZIDE 25 MG/1
25 TABLET ORAL DAILY
Status: DISCONTINUED | OUTPATIENT
Start: 2024-10-22 | End: 2024-10-23 | Stop reason: HOSPADM

## 2024-10-21 RX ORDER — ONDANSETRON HYDROCHLORIDE 2 MG/ML
4 INJECTION, SOLUTION INTRAVENOUS ONCE AS NEEDED
Status: DISCONTINUED | OUTPATIENT
Start: 2024-10-21 | End: 2024-10-21 | Stop reason: HOSPADM

## 2024-10-21 RX ORDER — HYDROMORPHONE HYDROCHLORIDE 1 MG/ML
0.2 INJECTION, SOLUTION INTRAMUSCULAR; INTRAVENOUS; SUBCUTANEOUS
Status: DISCONTINUED | OUTPATIENT
Start: 2024-10-21 | End: 2024-10-21 | Stop reason: HOSPADM

## 2024-10-21 RX ORDER — ONDANSETRON HYDROCHLORIDE 2 MG/ML
INJECTION, SOLUTION INTRAVENOUS AS NEEDED
Status: DISCONTINUED | OUTPATIENT
Start: 2024-10-21 | End: 2024-10-21

## 2024-10-21 RX ORDER — CIPROFLOXACIN 2 MG/ML
400 INJECTION, SOLUTION INTRAVENOUS ONCE
Status: DISCONTINUED | OUTPATIENT
Start: 2024-10-21 | End: 2024-10-21 | Stop reason: HOSPADM

## 2024-10-21 RX ORDER — TALC
3 POWDER (GRAM) TOPICAL NIGHTLY PRN
Status: DISCONTINUED | OUTPATIENT
Start: 2024-10-21 | End: 2024-10-23 | Stop reason: HOSPADM

## 2024-10-21 RX ORDER — FENTANYL CITRATE 50 UG/ML
INJECTION, SOLUTION INTRAMUSCULAR; INTRAVENOUS AS NEEDED
Status: DISCONTINUED | OUTPATIENT
Start: 2024-10-21 | End: 2024-10-21

## 2024-10-21 RX ORDER — HYDROMORPHONE HYDROCHLORIDE 1 MG/ML
0.2 INJECTION, SOLUTION INTRAMUSCULAR; INTRAVENOUS; SUBCUTANEOUS EVERY 2 HOUR PRN
Status: DISCONTINUED | OUTPATIENT
Start: 2024-10-21 | End: 2024-10-21

## 2024-10-21 RX ORDER — ACETAMINOPHEN 10 MG/ML
INJECTION, SOLUTION INTRAVENOUS AS NEEDED
Status: DISCONTINUED | OUTPATIENT
Start: 2024-10-21 | End: 2024-10-21

## 2024-10-21 SDOH — SOCIAL STABILITY: SOCIAL INSECURITY: WITHIN THE LAST YEAR, HAVE YOU BEEN HUMILIATED OR EMOTIONALLY ABUSED IN OTHER WAYS BY YOUR PARTNER OR EX-PARTNER?: NO

## 2024-10-21 SDOH — SOCIAL STABILITY: SOCIAL INSECURITY: WERE YOU ABLE TO COMPLETE ALL THE BEHAVIORAL HEALTH SCREENINGS?: YES

## 2024-10-21 SDOH — ECONOMIC STABILITY: FOOD INSECURITY: WITHIN THE PAST 12 MONTHS, YOU WORRIED THAT YOUR FOOD WOULD RUN OUT BEFORE YOU GOT THE MONEY TO BUY MORE.: NEVER TRUE

## 2024-10-21 SDOH — ECONOMIC STABILITY: HOUSING INSECURITY: AT ANY TIME IN THE PAST 12 MONTHS, WERE YOU HOMELESS OR LIVING IN A SHELTER (INCLUDING NOW)?: NO

## 2024-10-21 SDOH — SOCIAL STABILITY: SOCIAL INSECURITY: HAS ANYONE EVER THREATENED TO HURT YOUR FAMILY OR YOUR PETS?: NO

## 2024-10-21 SDOH — ECONOMIC STABILITY: FOOD INSECURITY: WITHIN THE PAST 12 MONTHS, THE FOOD YOU BOUGHT JUST DIDN'T LAST AND YOU DIDN'T HAVE MONEY TO GET MORE.: NEVER TRUE

## 2024-10-21 SDOH — ECONOMIC STABILITY: INCOME INSECURITY: IN THE PAST 12 MONTHS HAS THE ELECTRIC, GAS, OIL, OR WATER COMPANY THREATENED TO SHUT OFF SERVICES IN YOUR HOME?: NO

## 2024-10-21 SDOH — SOCIAL STABILITY: SOCIAL INSECURITY: ABUSE: ADULT

## 2024-10-21 SDOH — SOCIAL STABILITY: SOCIAL INSECURITY: WITHIN THE LAST YEAR, HAVE YOU BEEN AFRAID OF YOUR PARTNER OR EX-PARTNER?: NO

## 2024-10-21 SDOH — SOCIAL STABILITY: SOCIAL INSECURITY
WITHIN THE LAST YEAR, HAVE YOU BEEN RAPED OR FORCED TO HAVE ANY KIND OF SEXUAL ACTIVITY BY YOUR PARTNER OR EX-PARTNER?: NO

## 2024-10-21 SDOH — SOCIAL STABILITY: SOCIAL INSECURITY
WITHIN THE LAST YEAR, HAVE YOU BEEN KICKED, HIT, SLAPPED, OR OTHERWISE PHYSICALLY HURT BY YOUR PARTNER OR EX-PARTNER?: NO

## 2024-10-21 SDOH — SOCIAL STABILITY: SOCIAL INSECURITY: ARE THERE ANY APPARENT SIGNS OF INJURIES/BEHAVIORS THAT COULD BE RELATED TO ABUSE/NEGLECT?: NO

## 2024-10-21 SDOH — SOCIAL STABILITY: SOCIAL INSECURITY: HAVE YOU HAD ANY THOUGHTS OF HARMING ANYONE ELSE?: NO

## 2024-10-21 SDOH — SOCIAL STABILITY: SOCIAL INSECURITY: ARE YOU OR HAVE YOU BEEN THREATENED OR ABUSED PHYSICALLY, EMOTIONALLY, OR SEXUALLY BY ANYONE?: NO

## 2024-10-21 SDOH — SOCIAL STABILITY: SOCIAL INSECURITY: DOES ANYONE TRY TO KEEP YOU FROM HAVING/CONTACTING OTHER FRIENDS OR DOING THINGS OUTSIDE YOUR HOME?: NO

## 2024-10-21 SDOH — SOCIAL STABILITY: SOCIAL INSECURITY: DO YOU FEEL ANYONE HAS EXPLOITED OR TAKEN ADVANTAGE OF YOU FINANCIALLY OR OF YOUR PERSONAL PROPERTY?: NO

## 2024-10-21 SDOH — SOCIAL STABILITY: SOCIAL INSECURITY: DO YOU FEEL UNSAFE GOING BACK TO THE PLACE WHERE YOU ARE LIVING?: NO

## 2024-10-21 SDOH — SOCIAL STABILITY: SOCIAL INSECURITY: HAVE YOU HAD THOUGHTS OF HARMING ANYONE ELSE?: YES

## 2024-10-21 ASSESSMENT — PAIN DESCRIPTION - LOCATION
LOCATION: ABDOMEN

## 2024-10-21 ASSESSMENT — PAIN SCALES - GENERAL
PAINLEVEL_OUTOF10: 10 - WORST POSSIBLE PAIN
PAINLEVEL_OUTOF10: 3
PAINLEVEL_OUTOF10: 6
PAINLEVEL_OUTOF10: 5 - MODERATE PAIN
PAINLEVEL_OUTOF10: 10 - WORST POSSIBLE PAIN
PAINLEVEL_OUTOF10: 6
PAINLEVEL_OUTOF10: 7
PAINLEVEL_OUTOF10: 10 - WORST POSSIBLE PAIN
PAINLEVEL_OUTOF10: 7
PAINLEVEL_OUTOF10: 5 - MODERATE PAIN
PAINLEVEL_OUTOF10: 5 - MODERATE PAIN
PAINLEVEL_OUTOF10: 10 - WORST POSSIBLE PAIN
PAINLEVEL_OUTOF10: 3
PAINLEVEL_OUTOF10: 3
PAINLEVEL_OUTOF10: 0 - NO PAIN
PAINLEVEL_OUTOF10: 10 - WORST POSSIBLE PAIN

## 2024-10-21 ASSESSMENT — PAIN - FUNCTIONAL ASSESSMENT
PAIN_FUNCTIONAL_ASSESSMENT: 0-10
PAIN_FUNCTIONAL_ASSESSMENT: 0-10
PAIN_FUNCTIONAL_ASSESSMENT: UNABLE TO SELF-REPORT
PAIN_FUNCTIONAL_ASSESSMENT: 0-10
PAIN_FUNCTIONAL_ASSESSMENT: UNABLE TO SELF-REPORT
PAIN_FUNCTIONAL_ASSESSMENT: 0-10
PAIN_FUNCTIONAL_ASSESSMENT: UNABLE TO SELF-REPORT
PAIN_FUNCTIONAL_ASSESSMENT: 0-10

## 2024-10-21 ASSESSMENT — ACTIVITIES OF DAILY LIVING (ADL)
TOILETING: INDEPENDENT
GROOMING: INDEPENDENT
HEARING - LEFT EAR: FUNCTIONAL
JUDGMENT_ADEQUATE_SAFELY_COMPLETE_DAILY_ACTIVITIES: YES
WALKS IN HOME: INDEPENDENT
DRESSING YOURSELF: INDEPENDENT
ADEQUATE_TO_COMPLETE_ADL: YES
BATHING: INDEPENDENT
LACK_OF_TRANSPORTATION: NO
FEEDING YOURSELF: INDEPENDENT
PATIENT'S MEMORY ADEQUATE TO SAFELY COMPLETE DAILY ACTIVITIES?: YES
HEARING - RIGHT EAR: FUNCTIONAL

## 2024-10-21 ASSESSMENT — COGNITIVE AND FUNCTIONAL STATUS - GENERAL
TURNING FROM BACK TO SIDE WHILE IN FLAT BAD: A LITTLE
WALKING IN HOSPITAL ROOM: A LITTLE
DRESSING REGULAR UPPER BODY CLOTHING: A LITTLE
STANDING UP FROM CHAIR USING ARMS: A LITTLE
PERSONAL GROOMING: A LITTLE
DRESSING REGULAR LOWER BODY CLOTHING: A LITTLE
CLIMB 3 TO 5 STEPS WITH RAILING: A LITTLE
MOVING FROM LYING ON BACK TO SITTING ON SIDE OF FLAT BED WITH BEDRAILS: A LITTLE
PATIENT BASELINE BEDBOUND: NO
TOILETING: A LITTLE
MOVING TO AND FROM BED TO CHAIR: A LITTLE
DAILY ACTIVITIY SCORE: 19
HELP NEEDED FOR BATHING: A LITTLE
MOBILITY SCORE: 18

## 2024-10-21 ASSESSMENT — LIFESTYLE VARIABLES
PRESCIPTION_ABUSE_PAST_12_MONTHS: NO
SKIP TO QUESTIONS 9-10: 1
SUBSTANCE_ABUSE_PAST_12_MONTHS: NO
AUDIT-C TOTAL SCORE: 1
HOW OFTEN DO YOU HAVE 6 OR MORE DRINKS ON ONE OCCASION: NEVER
AUDIT-C TOTAL SCORE: 1
HOW OFTEN DO YOU HAVE A DRINK CONTAINING ALCOHOL: MONTHLY OR LESS
HOW MANY STANDARD DRINKS CONTAINING ALCOHOL DO YOU HAVE ON A TYPICAL DAY: 1 OR 2

## 2024-10-21 ASSESSMENT — COLUMBIA-SUICIDE SEVERITY RATING SCALE - C-SSRS
1. IN THE PAST MONTH, HAVE YOU WISHED YOU WERE DEAD OR WISHED YOU COULD GO TO SLEEP AND NOT WAKE UP?: NO
6. HAVE YOU EVER DONE ANYTHING, STARTED TO DO ANYTHING, OR PREPARED TO DO ANYTHING TO END YOUR LIFE?: NO
2. HAVE YOU ACTUALLY HAD ANY THOUGHTS OF KILLING YOURSELF?: NO

## 2024-10-21 ASSESSMENT — PATIENT HEALTH QUESTIONNAIRE - PHQ9
1. LITTLE INTEREST OR PLEASURE IN DOING THINGS: NOT AT ALL
2. FEELING DOWN, DEPRESSED OR HOPELESS: NOT AT ALL
SUM OF ALL RESPONSES TO PHQ9 QUESTIONS 1 & 2: 0

## 2024-10-21 ASSESSMENT — PAIN DESCRIPTION - ORIENTATION
ORIENTATION: LEFT
ORIENTATION: LEFT

## 2024-10-21 NOTE — ANESTHESIA PREPROCEDURE EVALUATION
Patient: Skyler Shelton    Procedure Information       Date/Time: 10/21/24 1045    Procedure: Left Open Partial Nephrectomy (Left)    Location: [facility] OR 04 / Virtual [facility]    Surgeons: Baldo Shannon MD            Relevant Problems   Cardiac   (+) HTN (hypertension)      Pulmonary   (+) YI (obstructive sleep apnea)       Clinical information reviewed:   Tobacco  Allergies  Meds   Med Hx  Surg Hx   Fam Hx  Soc Hx        NPO Detail:  NPO/Void Status  Carbohydrate Drink Given Prior to Surgery? : N  Date of Last Liquid: 10/21/24  Time of Last Liquid: 0700 (with meds)  Date of Last Solid: 10/20/24  Time of Last Solid: 2000  Last Intake Type: Clear fluids  Time of Last Void: 1000         Physical Exam    Airway  Mallampati: IV  TM distance: >3 FB  Neck ROM: full     Cardiovascular   Rhythm: regular  Rate: normal     Dental        Pulmonary   Breath sounds clear to auscultation     Abdominal   (+) obese  Abdomen: soft             Anesthesia Plan    History of general anesthesia?: yes  History of complications of general anesthesia?: no    ASA 2     general   (Mallampati IV - plan for glide  No block per surgeon)  intravenous induction   Postoperative administration of opioids is intended.  Anesthetic plan and risks discussed with patient.  Use of blood products discussed with patient who consented to blood products.    Plan discussed with resident and attending.

## 2024-10-21 NOTE — ANESTHESIA PROCEDURE NOTES
Peripheral IV  Date/Time: 10/21/2024 12:30 PM      Placement  Needle size: 18 G  Laterality: right  Location: hand  Local anesthetic: none  Site prep: alcohol  Technique: anatomical landmarks  Attempts: 1

## 2024-10-21 NOTE — ANESTHESIA PROCEDURE NOTES
Arterial Line:    Date/Time: 10/21/2024 12:10 PM    Staffing  Performed: resident   Authorized by: Talisha Garcias MD    Performed by: Isaiah Brody MD    An arterial line was placed. Procedure performed using surface landmarks.in the OR for the following indication(s): continuous blood pressure monitoring and blood sampling needed.    A 20 gauge (size), 1 and 3/4 inch (length), Angiocath (type) catheter was placed into the Right radial artery, secured by Tegaderm,   Seldinger technique used.  Events:  patient tolerated procedure well with no complications.

## 2024-10-21 NOTE — BRIEF OP NOTE
Date: 10/21/2024  OR Location: [facility] OR    Name: Skyler Shelton, : 1980, Age: 44 y.o., MRN: [id number], Sex: male    Diagnosis  Pre-op Diagnosis      * Left renal mass [N28.89] Post-op Diagnosis     * Left renal mass [N28.89]     Procedures  Left Open Partial Nephrectomy  34371 - [place] NEPHRECTOMY PARTIAL      Surgeons      * Baldo Shannon - Primary    Resident/Fellow/Other Assistant:  Surgeons and Role:     * Juan Vance MD - Resident - Assisting     * Masha Mccracken MD - Resident - Assisting    Procedure Summary  Anesthesia: General  ASA: II  Anesthesia Staff: Anesthesiologist: Talisha Garcias MD; Swati Schulte MD  CRNA: MALIA Aguilera-CRNA  Anesthesia Resident: Isaiah Brody MD  Estimated Blood Loss: 50 mL  Intra-op Medications:   Administrations occurring from 1045 to 1315 on 10/21/24:   Medication Name Total Dose   sodium chloride 0.9 % irrigation solution 2,000 mL   ceFAZolin (Ancef) vial 1 g 2 g   dexAMETHasone (Decadron) 4 mg/mL 8 mg   fentaNYL (Sublimaze) injection 50 mcg/mL 50 mcg   LR bolus Cannot be calculated   lidocaine (Xylocaine) injection 2 % 60 mg   midazolam PF (Versed) injection 1 mg/mL 2 mg   phenylephrine (Aditya-Synephrine) 10 mg in sodium chloride 0.9% 250 mL (0.04 mg/mL) infusion (premix) 0.85 mg   phenylephrine 40 mcg/mL syringe 10 mL 680 mcg   propofol (Diprivan) injection 10 mg/mL 200 mg   rocuronium (ZeMuron) 50 mg/5 mL injection 80 mg              Anesthesia Record               Intraprocedure I/O Totals          Intake    LR bolus 1500.00 mL    Phenylephrine Drip 0.00 mL    The total shown is the total volume documented since Anesthesia Start was filed.    Total Intake 1500 mL       Output    Urine 245 mL    Total Output 245 mL       Net    Net Volume 1255 mL          Specimen:   ID Type Source Tests Collected by Time   1 : Renal fat Tissue ADIPOSE TISSUE SURGICAL PATHOLOGY EXAM Baldo Shannon MD 10/21/2024 1336   2 : anterior tumor Tissue KIDNEY PARTIAL NEPHRECTOMY LEFT  SURGICAL PATHOLOGY EXAM Baldo Shannon MD 10/21/2024 1527   3 : posterior tumor Tissue KIDNEY PARTIAL NEPHRECTOMY LEFT SURGICAL PATHOLOGY EXAM Baldo Shannon MD 10/21/2024 1527        Staff:   Circulator: Kyle  Scrub Person: Mauricio Hanson Scrub: Corrina  Circulator: Kamila  Relief Circulator: Karen  Relief Circulator: Rambo  Relief Scrub: Kari  Relief Scrub: Seth          Findings: 36 min cold ischemia time including 10 min of icing. Two suspicious masses noted and removed separately. Both defects repaired primarily with good hemostatic effect.     Complications:  None; patient tolerated the procedure well.     Disposition: PACU - hemodynamically stable.  Condition: stable  Specimens Collected:   ID Type Source Tests Collected by Time   1 : Renal fat Tissue ADIPOSE TISSUE SURGICAL PATHOLOGY EXAM Baldo Shannon MD 10/21/2024 1336   2 : anterior tumor Tissue KIDNEY PARTIAL NEPHRECTOMY LEFT SURGICAL PATHOLOGY EXAM Baldo Shannon MD 10/21/2024 1526   3 : posterior tumor Tissue KIDNEY PARTIAL NEPHRECTOMY LEFT SURGICAL PATHOLOGY EXAM Baldo Shannon MD 10/21/2024 1527     Attending Attestation: I was present and scrubbed for the entire procedure.    Baldo Shannon  Phone Number: [phone]

## 2024-10-21 NOTE — ANESTHESIA PROCEDURE NOTES
Airway  Date/Time: 10/21/2024 12:00 PM  Urgency: elective    Airway not difficult    Staffing  Performed: resident   Authorized by: Talisha Garcias MD    Performed by: Isaiah Brody MD  Patient location during procedure: OR    Indications and Patient Condition  Indications for airway management: anesthesia and airway protection  Spontaneous ventilation: present  Sedation level: deep  Preoxygenated: yes  Patient position: sniffing  Mask difficulty assessment: 1 - vent by mask  Planned trial extubation    Final Airway Details  Final airway type: endotracheal airway      Successful airway: ETT  Cuffed: yes   Successful intubation technique: video laryngoscopy  Blade: Jordyn (Stanton used given Mallampti IV)  Blade size: #4  ETT size (mm): 7.5  Cormack-Lehane Classification: grade I - full view of glottis  Placement verified by: chest auscultation and capnometry   Measured from: gums  ETT to gums (cm): 22  Number of attempts at approach: 1  Ventilation between attempts: none  Number of other approaches attempted: 0

## 2024-10-21 NOTE — OP NOTE
Left Open Partial Nephrectomy (L) Operative Note     Date: 10/21/2024  OR Location: [facility] OR    Name: Skyler Shelton, : 1980, Age: 44 y.o., MRN: [id number], Sex: male    Diagnosis  Pre-op Diagnosis      * Left renal mass [N28.89] Post-op Diagnosis     * Left renal mass [N28.89]     Procedures  Left Open Partial Nephrectomy  28453 - [place] NEPHRECTOMY PARTIAL      Surgeons      * Baldo Shannon - Primary    Resident/Fellow/Other Assistant:  Surgeons and Role:     * Juan Vance MD - Resident - Assisting     * Masha Mccracken MD - Resident - Assisting    Procedure Summary  Anesthesia: General  ASA: II  Anesthesia Staff: Anesthesiologist: Talisha Garcias MD; Swati Schulte MD  CRNA: MALIA Aguilera-CRNA  Anesthesia Resident: Isaiah Brody MD  Estimated Blood Loss:  50 mL  Intra-op Medications:   Administrations occurring from 1045 to 1315 on 10/21/24:   Medication Name Total Dose   sodium chloride 0.9 % irrigation solution 2,000 mL   ceFAZolin (Ancef) vial 1 g 2 g   dexAMETHasone (Decadron) 4 mg/mL 8 mg   fentaNYL (Sublimaze) injection 50 mcg/mL 50 mcg   LR bolus Cannot be calculated   lidocaine (Xylocaine) injection 2 % 60 mg   phenylephrine (Aditya-Synephrine) 10 mg in sodium chloride 0.9% 250 mL (0.04 mg/mL) infusion (premix) 0.85 mg   phenylephrine 40 mcg/mL syringe 10 mL 680 mcg   propofol (Diprivan) injection 10 mg/mL 200 mg   rocuronium (ZeMuron) 50 mg/5 mL injection 80 mg              Anesthesia Record               Intraprocedure I/O Totals          Intake    Phenylephrine Drip 0.00 mL    The total shown is the total volume documented since Anesthesia Start was filed.    Total Intake 0 mL       Output    Urine 245 mL    Total Output 245 mL       Net    Net Volume -245 mL          Specimen:   ID Type Source Tests Collected by Time   1 : Renal fat Tissue ADIPOSE TISSUE SURGICAL PATHOLOGY EXAM Baldo Shannon MD 10/21/2024 1336   2 : anterior tumor Tissue KIDNEY PARTIAL NEPHRECTOMY LEFT SURGICAL PATHOLOGY EXAM  Baldo Shannon MD 10/21/2024 1527   3 : posterior tumor Tissue KIDNEY PARTIAL NEPHRECTOMY LEFT SURGICAL PATHOLOGY EXAM Baldo Shannon MD 10/21/2024 1527        Staff:   Circulator: Kyle  Scrub Person: Mauricio  Margie Scrub: Corrina  Circulator: Kamila  Relief Circulator: Karen  Relief Circulator: Rambo  Relief Scrub: Kari  Relief Scrub: Seth         Drains and/or Catheters:   Closed/Suction Drain 1 Left LLQ Bulb 10 Fr. (Active)   Dressing Status Clean;Dry 10/21/24 1537   Status To bulb suction 10/21/24 1537       Urethral Catheter Non-latex 16 Fr. (Active)   Site Assessment Clean;Skin intact 10/21/24 1537   Collection Container Standard drainage bag 10/21/24 1537   Securement Method Securing device (Describe) 10/21/24 1537   Reason for Continuing Urinary Catheterization surgical procedures: urological/gynecological, pelvic oncology, anal, prolonged surgical procedure 10/21/24 1537       Findings: 36 min cold ischemia time including 10 min of icing. Two suspicious masses noted and removed separately. Both defects repaired primarily with good hemostatic effect.     Indications: Skyler Shelton is an 44 y.o. male who is having surgery for Left renal mass [N28.89]. Patient has a history of YI, Htn, depression, and migraines who presents with two complex renal cysts.     The patient was seen in the preoperative area. The risks, benefits, complications, treatment options, non-operative alternatives, expected recovery and outcomes were discussed with the patient. The possibilities of reaction to medication, pulmonary aspiration, injury to surrounding structures, bleeding, recurrent infection, the need for additional procedures, failure to diagnose a condition, and creating a complication requiring transfusion or operation were discussed with the patient. The patient concurred with the proposed plan, giving informed consent.  The site of surgery was properly noted/marked if necessary per policy. The patient has been  actively warmed in preoperative area. Preoperative antibiotics have been ordered and given within 1 hours of incision. Venous thrombosis prophylaxis have been ordered including bilateral sequential compression devices and chemical prophylaxis    Procedure Details: The patient was brought into the operating room and patient identification re-confirmed prior to anesthesia in the operating room via standard timeout procedure. Intravenous antibiotics were administered for infection prophylaxis and bilateral lower extremity compression devices were placed for DVT prevention. SQH was also administered for DVT prophylaxis.      General endotracheal anesthesia was obtained without difficulty and orogastric tube was placed. A timeout was then performed.     Kennedy catheter was placed atraumatically and in a sterile fashion. The patient was then positioned into right lateral decubitus position, left side up.      The patient was positioned with their back vertical relative to the base of the bed, aligned with the edge of the bed.     Axillary roll was placed below the axilla. The bottom leg was gently flexed and top leg was straight. The legs were  by pillows.  The arms were secured in arm boards. The table was mildly flexed and the patient was then secured to the table with silk tape. All bony prominences were well padded.      The patient was then prepped and draped in the usual sterile manner. The procedure was initiated by making an incision along the course of the 11th rib towards the umbilicus. This was carried down through dermis and subcutaneous tissues, as well as the external and internal oblique muscles until we were on top of the 11th rib. The periosteum was incised sharply, then elevated off the rib using the osteotome and Doluhn osteal elevator. The rib was then cut using the .     The lumbodorsal fascia was incised and the retroperitoneum was entered. The peritoneum was swept away anteriorly  and medially, and the kidney with pararenal fat dissected from the psoas muscle posteriorly. The superior attachments of the kidney were carefully swept away from the pleura.      The posterior Gerota's fascia was opened to identify the kidney, ureter and gonadal vein. We then created a plane between the anterior Gerota's fascia and the posterior peritoneum.     We then mobilized the kidney posteriorly inferiorly and superiorly leaving the adrenal gland in situ. A vessel loop was placed around the ureter once dissected freely.     A self-retaining Bookwalter retractor was then placed. The kidney was defatted completely, and the perirenal fat sent as specimen. We then identified the renal hilum, which contained one artery and one vein. Vessel loops were placed around these.     12.5 mg of mannitol were administered by anesthesia. A straight bulldog clamp was placed on the vein, and a curved on the artery. Ice was packed around the kidney and 10 minutes of cold ischemia was initiated.     Following 10 minutes, the ice was evacuated. We then turned our attention to the posterior mass. The mass was excised sharply using a 15 blade. Once completely removed, it was sent off the field for pathology. 3-0 vicryl figure-of-eight sutures were used to oversew any collecting system defects and arterioles. For the renorrhaphy, 2-0 vicryl sutures were placed across the defect. The defect was then packed with fibrillar cubes. Each 2-0 vicryl was sequentially tied down, packing additional fibrillar in each tie.     Once this was complete, we turned our attention to the anterolateral mass. We again excised this mass using a 15 blade and repaired it similarly using 3-0 vicryl sutures for the bed, and 2-0 sutures with fibrillar for the renorrhaphy.      Once we had fully reconstructed both renal beds we removed the venous and arterial clamp and examined for ongoing bleeding and placed additional stitches as needed. Hemostasis was  excellent.      We then warmed the kidney and irrigated the retroperitoneum and there was no evidence of any ongoing bleeding.     A 10 Fr flat CRISTIAN drain was placed adjacent to the repair and secured with a drain stitch.      The flank was then closed, with a #1 PDS for the internal oblique and a second for the external oblique. 0.5% marcaine with dexamethasone was then infiltrated into the wound. The wound was irrigated. A 3-0 vicryl layer was used to approximate the dermis. The skin was closed with 4-0 monocryl and dermabond.      All counts were pronounced correct the patient was taken the recovery room extubated after the wound was dressed.    Electronically signed by Masha Mccracken MD   Date: 10/21/2024  Time: 4:32 PM    Complications:  None; patient tolerated the procedure well.    Disposition: PACU - hemodynamically stable.  Condition: stable         Additional Details: n/a    Attending Attestation: I was present and scrubbed for the entire procedure.    Baldo Shannon  Phone Number: [phone]

## 2024-10-21 NOTE — ANESTHESIA POSTPROCEDURE EVALUATION
Patient: Skyler Shelton    Procedure Summary       Date: 10/21/24 Room / Location: [facility] OR 04 / [facility]    Anesthesia Start: 1148 Anesthesia Stop:     Procedure: Left Open Partial Nephrectomy (Left) Diagnosis:       Left renal mass      (Left renal mass [N28.89])    Surgeons: Baldo Shannon MD Responsible Provider: Talisha Garcias MD    Anesthesia Type: general ASA Status: 2            Anesthesia Type: general    Vitals Value Taken Time   /58 10/21/24 1610   Temp 36.0 10/21/24 1617   Pulse 94 10/21/24 1614   Resp 18 10/21/24 1614   SpO2 93 % 10/21/24 1614   Vitals shown include unfiled device data.    Anesthesia Post Evaluation    Patient location during evaluation: PACU  Patient participation: complete - patient participated  Level of consciousness: awake  Pain management: adequate  Airway patency: patent  Cardiovascular status: acceptable  Respiratory status: acceptable and face mask  Hydration status: acceptable  Postoperative Nausea and Vomiting: none        No notable events documented.

## 2024-10-22 LAB
ANION GAP SERPL CALC-SCNC: 15 MMOL/L (ref 10–20)
BUN SERPL-MCNC: 21 MG/DL (ref 6–23)
CALCIUM SERPL-MCNC: 9.1 MG/DL (ref 8.6–10.6)
CHLORIDE SERPL-SCNC: 98 MMOL/L (ref 98–107)
CO2 SERPL-SCNC: 24 MMOL/L (ref 21–32)
CREAT SERPL-MCNC: 1.31 MG/DL (ref 0.5–1.3)
EGFRCR SERPLBLD CKD-EPI 2021: 68 ML/MIN/1.73M*2
ERYTHROCYTE [DISTWIDTH] IN BLOOD BY AUTOMATED COUNT: 12.3 % (ref 11.5–14.5)
GLUCOSE SERPL-MCNC: 118 MG/DL (ref 74–99)
HCT VFR BLD AUTO: 37.4 % (ref 41–52)
HGB BLD-MCNC: 13 G/DL (ref 13.5–17.5)
MCH RBC QN AUTO: 29.7 PG (ref 26–34)
MCHC RBC AUTO-ENTMCNC: 34.8 G/DL (ref 32–36)
MCV RBC AUTO: 85 FL (ref 80–100)
NRBC BLD-RTO: 0 /100 WBCS (ref 0–0)
PLATELET # BLD AUTO: 293 X10*3/UL (ref 150–450)
POTASSIUM SERPL-SCNC: 3.7 MMOL/L (ref 3.5–5.3)
RBC # BLD AUTO: 4.38 X10*6/UL (ref 4.5–5.9)
SODIUM SERPL-SCNC: 133 MMOL/L (ref 136–145)
WBC # BLD AUTO: 15.2 X10*3/UL (ref 4.4–11.3)

## 2024-10-22 PROCEDURE — 97530 THERAPEUTIC ACTIVITIES: CPT | Mod: GP

## 2024-10-22 PROCEDURE — 97161 PT EVAL LOW COMPLEX 20 MIN: CPT | Mod: GP

## 2024-10-22 PROCEDURE — 2500000001 HC RX 250 WO HCPCS SELF ADMINISTERED DRUGS (ALT 637 FOR MEDICARE OP)

## 2024-10-22 PROCEDURE — 2500000004 HC RX 250 GENERAL PHARMACY W/ HCPCS (ALT 636 FOR OP/ED): Performed by: STUDENT IN AN ORGANIZED HEALTH CARE EDUCATION/TRAINING PROGRAM

## 2024-10-22 PROCEDURE — 1170000001 HC PRIVATE ONCOLOGY ROOM DAILY

## 2024-10-22 PROCEDURE — 85027 COMPLETE CBC AUTOMATED: CPT | Performed by: STUDENT IN AN ORGANIZED HEALTH CARE EDUCATION/TRAINING PROGRAM

## 2024-10-22 PROCEDURE — 36415 COLL VENOUS BLD VENIPUNCTURE: CPT | Performed by: STUDENT IN AN ORGANIZED HEALTH CARE EDUCATION/TRAINING PROGRAM

## 2024-10-22 PROCEDURE — 82374 ASSAY BLOOD CARBON DIOXIDE: CPT | Performed by: STUDENT IN AN ORGANIZED HEALTH CARE EDUCATION/TRAINING PROGRAM

## 2024-10-22 PROCEDURE — 2500000001 HC RX 250 WO HCPCS SELF ADMINISTERED DRUGS (ALT 637 FOR MEDICARE OP): Performed by: STUDENT IN AN ORGANIZED HEALTH CARE EDUCATION/TRAINING PROGRAM

## 2024-10-22 PROCEDURE — 2500000005 HC RX 250 GENERAL PHARMACY W/O HCPCS: Performed by: STUDENT IN AN ORGANIZED HEALTH CARE EDUCATION/TRAINING PROGRAM

## 2024-10-22 RX ORDER — ACETAMINOPHEN 325 MG/1
975 TABLET ORAL EVERY 6 HOURS
Status: DISCONTINUED | OUTPATIENT
Start: 2024-10-22 | End: 2024-10-23 | Stop reason: HOSPADM

## 2024-10-22 RX ORDER — OXYCODONE HYDROCHLORIDE 5 MG/1
5 TABLET ORAL EVERY 6 HOURS PRN
Status: DISCONTINUED | OUTPATIENT
Start: 2024-10-22 | End: 2024-10-23 | Stop reason: HOSPADM

## 2024-10-22 RX ORDER — POLYETHYLENE GLYCOL 3350 17 G/17G
17 POWDER, FOR SOLUTION ORAL DAILY
Qty: 7 PACKET | Refills: 0 | Status: SHIPPED | OUTPATIENT
Start: 2024-10-22 | End: 2024-10-29

## 2024-10-22 RX ORDER — OXYCODONE HYDROCHLORIDE 5 MG/1
5 TABLET ORAL EVERY 6 HOURS PRN
Qty: 12 TABLET | Refills: 0 | Status: SHIPPED | OUTPATIENT
Start: 2024-10-22 | End: 2024-10-25

## 2024-10-22 RX ORDER — DULOXETIN HYDROCHLORIDE 30 MG/1
90 CAPSULE, DELAYED RELEASE ORAL
Status: DISCONTINUED | OUTPATIENT
Start: 2024-10-23 | End: 2024-10-23 | Stop reason: HOSPADM

## 2024-10-22 RX ORDER — METHOCARBAMOL 500 MG/1
500 TABLET, FILM COATED ORAL EVERY 6 HOURS SCHEDULED
Qty: 20 TABLET | Refills: 0 | Status: SHIPPED | OUTPATIENT
Start: 2024-10-22 | End: 2024-10-27

## 2024-10-22 ASSESSMENT — COGNITIVE AND FUNCTIONAL STATUS - GENERAL
MOVING FROM LYING ON BACK TO SITTING ON SIDE OF FLAT BED WITH BEDRAILS: A LITTLE
DAILY ACTIVITIY SCORE: 24
MOBILITY SCORE: 17
MOVING TO AND FROM BED TO CHAIR: A LITTLE
DAILY ACTIVITIY SCORE: 24
MOBILITY SCORE: 24
WALKING IN HOSPITAL ROOM: A LITTLE
MOBILITY SCORE: 22
STANDING UP FROM CHAIR USING ARMS: A LITTLE
CLIMB 3 TO 5 STEPS WITH RAILING: A LITTLE
CLIMB 3 TO 5 STEPS WITH RAILING: A LITTLE
TURNING FROM BACK TO SIDE WHILE IN FLAT BAD: A LOT
WALKING IN HOSPITAL ROOM: A LITTLE

## 2024-10-22 ASSESSMENT — PAIN SCALES - GENERAL
PAINLEVEL_OUTOF10: 3
PAINLEVEL_OUTOF10: 6
PAINLEVEL_OUTOF10: 7
PAINLEVEL_OUTOF10: 8
PAINLEVEL_OUTOF10: 7
PAINLEVEL_OUTOF10: 7
PAINLEVEL_OUTOF10: 3
PAINLEVEL_OUTOF10: 8

## 2024-10-22 ASSESSMENT — ACTIVITIES OF DAILY LIVING (ADL)
LACK_OF_TRANSPORTATION: NO
ADL_ASSISTANCE: INDEPENDENT

## 2024-10-22 ASSESSMENT — PAIN - FUNCTIONAL ASSESSMENT
PAIN_FUNCTIONAL_ASSESSMENT: 0-10

## 2024-10-22 ASSESSMENT — PAIN DESCRIPTION - LOCATION: LOCATION: ABDOMEN

## 2024-10-22 NOTE — PROGRESS NOTES
"Skyler Shelton is a 44 y.o. male on day 1 of admission presenting with Left renal mass.    Subjective   NAEO. Doing well. Complains of some flank incisional pain this AM, otherwise tolerating CLD. Has not ambulated yet.       Objective     Physical Exam    Last Recorded Vitals  Blood pressure 142/80, pulse 102, temperature 37.6 °C (99.7 °F), temperature source Temporal, resp. rate 16, height 1.727 m (5' 8\"), weight 109 kg (240 lb 4.8 oz), SpO2 99%.  Intake/Output last 3 Shifts:  I/O last 3 completed shifts:  In: 1900 (17.4 mL/kg) [I.V.:400 (3.7 mL/kg); IV Piggyback:1500]  Out: 1515 (13.9 mL/kg) [Urine:1335 (0.3 mL/kg/hr); Drains:180]  Weight: 109 kg     GEN: NAD  HEENT: trachea midline, no scleral icterus  CV: RRR  Pulm: non labored breathing ORA  Abd: s/nt/nd  : Clear yellow urine in nicole tubing, CRISTIAN drain SS. Left flank incision with overlying dressing with minimal soak-through  Psych: appropriate mood and affect     Relevant Results  Scheduled medications  acetaminophen, 1,000 mg, intravenous, q6h SCAR  [START ON 10/23/2024] DULoxetine, 90 mg, oral, Daily  hydroCHLOROthiazide, 25 mg, oral, Daily  ketorolac, 10 mg, oral, q8h  lidocaine, 1 patch, transdermal, q24h  methocarbamol, 500 mg, oral, q6h SCAR  oxygen, , inhalation, Continuous - Inhalation  polyethylene glycol, 17 g, oral, Daily  sennosides, 2 tablet, oral, BID  simethicone, 160 mg, oral, TID after meals  topiramate, 25 mg, oral, Daily      Continuous medications  lactated Ringer's, 100 mL/hr, Last Rate: 100 mL/hr (10/21/24 2139)      PRN medications  PRN medications: bisacodyl, HYDROmorphone, melatonin, ondansetron **OR** ondansetron, oxyCODONE    Results for orders placed or performed during the hospital encounter of 10/21/24 (from the past 24 hours)   BLOOD GAS ARTERIAL FULL PANEL   Result Value Ref Range    POCT pH, Arterial 7.35 (L) 7.38 - 7.42 pH    POCT pCO2, Arterial 43 (H) 38 - 42 mm Hg    POCT pO2, Arterial 67 (L) 85 - 95 mm Hg    POCT SO2, " Arterial 95 94 - 100 %    POCT Oxy Hemoglobin, Arterial 92.8 (L) 94.0 - 98.0 %    POCT Hematocrit Calculated, Arterial 40.0 (L) 41.0 - 52.0 %    POCT Sodium, Arterial 132 (L) 136 - 145 mmol/L    POCT Potassium, Arterial 3.1 (L) 3.5 - 5.3 mmol/L    POCT Chloride, Arterial 99 98 - 107 mmol/L    POCT Ionized Calcium, Arterial 1.17 1.10 - 1.33 mmol/L    POCT Glucose, Arterial 137 (H) 74 - 99 mg/dL    POCT Lactate, Arterial 2.1 (H) 0.4 - 2.0 mmol/L    POCT Base Excess, Arterial -2.0 -2.0 - 3.0 mmol/L    POCT HCO3 Calculated, Arterial 23.7 22.0 - 26.0 mmol/L    POCT Hemoglobin, Arterial 13.4 (L) 13.5 - 17.5 g/dL    POCT Anion Gap, Arterial 12 10 - 25 mmo/L    Patient Temperature 37.0 degrees Celsius    FiO2 21 %   CBC   Result Value Ref Range    WBC 15.2 (H) 4.4 - 11.3 x10*3/uL    nRBC 0.0 0.0 - 0.0 /100 WBCs    RBC 4.38 (L) 4.50 - 5.90 x10*6/uL    Hemoglobin 13.0 (L) 13.5 - 17.5 g/dL    Hematocrit 37.4 (L) 41.0 - 52.0 %    MCV 85 80 - 100 fL    MCH 29.7 26.0 - 34.0 pg    MCHC 34.8 32.0 - 36.0 g/dL    RDW 12.3 11.5 - 14.5 %    Platelets 293 150 - 450 x10*3/uL   Basic metabolic panel   Result Value Ref Range    Glucose 118 (H) 74 - 99 mg/dL    Sodium 133 (L) 136 - 145 mmol/L    Potassium 3.7 3.5 - 5.3 mmol/L    Chloride 98 98 - 107 mmol/L    Bicarbonate 24 21 - 32 mmol/L    Anion Gap 15 10 - 20 mmol/L    Urea Nitrogen 21 6 - 23 mg/dL    Creatinine 1.31 (H) 0.50 - 1.30 mg/dL    eGFR 68 >60 mL/min/1.73m*2    Calcium 9.1 8.6 - 10.6 mg/dL       ECG 12 lead    Result Date: 10/21/2024  Normal sinus rhythm Normal ECG No previous ECGs available Confirmed by Yovany Taylor ([id number]) on 10/21/2024 11:00:27 PM    XR chest 2 views    Result Date: 10/15/2024  Interpreted By:  Dustin Junior, STUDY: XR CHEST 2 VIEWS;  10/15/2024 11:24 am   INDICATION: Signs/Symptoms:renal mass.   COMPARISON: Coronary artery calcium scoring CT scan 08/23/2023 and abdominal CT scan 09/19/2024   ACCESSION NUMBER(S): [id number]   ORDERING CLINICIAN: KEEGAN PANCHAL    FINDINGS: PA and lateral radiographs of the chest, including dual energy subtraction images are available for interpretation.   CARDIOMEDIASTINAL SILHOUETTE: The cardiomediastinal silhouette is within normal limits.   LUNGS: There is no pulmonary edema. No focal consolidation or sizeable pleural effusion is identified. No pneumothorax is seen.   ABDOMEN: No remarkable upper abdominal findings.   BONES: No acute osseous abnormality.       1. No evidence of acute cardiopulmonary process.   Signed by: Dustin Junior 10/15/2024 5:18 PM Dictation workstation:   [id number]                             Assessment/Plan   Assessment & Plan  Left renal mass    HTN (hypertension)    YI (obstructive sleep apnea)    Renal mass, left    Patient is a 44M w/ PMHx notable for left renal mass now s/p left open partial nephrectomy on 10/21/24 w/ Dr. Shannon. Post-operatively, patient is recovering well.    Neuro:   -Multimodal pain control via scheduled tylenol, toradol, robaxin, add PRN oxy 5/10    - Continue home Topamax, Duloxetine     CV:  -Monitor vitals  -Daily CBC to monitor acute blood loss anemia  -Continue home hydrochlorothiazide, hold home losartan      Pulm:  -IS q1h     GI:  - regular diet, bowel regimen, PRN ondansetron     :  -Monitor I/O  -Daily BMP to monitor electrolytes, replete PRN  -TOV today        ID:   -WBC 15 today, like reactive  -Trend WBC  -No concern for acute infection at this time     Prophy:  - No SQH due to higher risk of bleeding     Dispo:  - RNF  - Possible DC tomorrow     Discussed with attending Dr. Shiva Vance MD, LACIE  Urology, PGY5  Adult Urology Pager: [phone]  Pediatric Urology Pager: [phone]       Juan Vance MD

## 2024-10-22 NOTE — SIGNIFICANT EVENT
Post-Operative Check Note    S: Patient seen at bedside status post left open partial nephrectomy for post-operative evaluation. Patient denies fever, chills, chest pain, and shortness of breath. Pain is  controlled. They are tolerating sips of clear liquids without nausea or vomiting.  Has not yet ambulated.    O:  General: resting comfortably in bed  Neuro: alert and oriented, no obvious focal deficit   Head/Neck: normocephalic, atraumatic  ENT: moist mucous membranes  CV: regular rate and rhythm  Pulm: nonlabored breathing on room air, no conversational dyspnea  Abd: soft, nondistended, nontender, left flank incision covered with island dressing with minimal strikethrough. drain serosanguinous  : nicole catheter draining clear yellow urine  MSK: ERZA, no gross deformities  Psych: mood and behavior normal    A/P: Patient is doing well post-operatively. No changes to plan    Neuro: pain control: scheduled tylenol, scheduled PO Toradol 10mg q8h, Dilaudid 0.2 q4 PRN breakthrough, lido patch  Sleep: melatonin 3mg PRN  Home meds: duloxetine 60mg, topiramate 25mg daily  Cards: vitals/monitoring  Home meds: hydrochlorothiazide 25mg daily  Holding home losartan 100mg daily  Pulm: instruct/encourage IS, OOB  GI: clear liquid diet. Bowel regimen  /Renal: maintain nicole catheter, continue IVF. Possible removal of CRISTIAN and nicole tomorrow. POD1 BMP  Endo:  no glycemic issues  ID: perioperative antibiotics  Heme: POD1 CBC  MSK: encourage ambulation as tolerated    Prophylaxis: SCDs   Dispo: CLOVER Quinn MD   Urology PGY-3  Adult Urology Pager: [phone]   Pediatric Urology: [id number]

## 2024-10-22 NOTE — DISCHARGE INSTRUCTIONS
DEPARTMENT OF Urology  DISCHARGE INSTRUCTIONS -- Nephrectomy  Inpatient Surgery    C O N F I D E N T I A L   I N F O R M A T I O N    Skyler JACOB Shelton      Call [phone] during regular daytime business hours (8:00 am - 5:00 pm) and after 5:00 pm ask for the Urology resident with any questions or concerns.    If it is a life-threatening situation, proceed to the nearest emergency department.        Follow-up appointment:  Thursday, November 14 at 2:20pm at [facility] [place], [address] [place] [address], [address], [place] [address]       Thank you for the opportunity to care for you today.  Your health and healing are very important to us.  We hope we made you feel as comfortable as possible and are committed to your recovery and continued well-being.      The following is a brief overview of your nephrectomy procedure. Some of the information contained on this summary may be confidential.  This information should be kept in your records and should be shared with your regular doctor.    Physicians:   Dr. Baldo Shannon    Procedure performed: removal of part of your left kidney  Pending Results: surgical pathology    What to Expect During your Recovery and Home Care  Anesthesia Side Effects   You may feel sleepy, tired, or have a sore throat.   You may also feel drowsiness, dizziness, or inability to think clearly.  For your safety, do not drive, drink alcoholic beverages, take any unprescribed medication or make any important decisions for 24 hours after anesthesia.  A responsible adult should be with you for 24 hours.        Activity and Recovery    No heavy lifting greater than 10 pounds for the next 4 weeks. No driving until mobility has returned to normal. Do not drive or operate heavy machinery while taking narcotic pain medications as these medications can alter perception, impair judgement, and slow reaction times.  Pain Control  Unfortunately, you may experience pain after your procedure. Adequate pain  management can include alternative measures to help ease your pain and that can include over the counter Tylenol/ibuprofen or oxycodone which can be taken as prescribed as needed for breakthrough pain. Do not take more than 4,000mg of Tylenol in a 24-hour period.      If your procedure was done robotically you may experience gas pains from the surgery. Getting up and moving around is the best way to relieve those pains. You can also take some over the counter gas-x(simethicone).    Nausea/Vomiting   Clear liquids are best tolerated at first. Start slow, advance your diet as tolerated to normal foods. Avoid spicy, greasy, heavy foods at first. Also, you may feel nauseous or like you need to vomit if you take any type of medication on an empty stomach.  Call your physician if you are unable to eat or drink, are not passing gas and have persistent vomiting.    Signs of Bleeding   You could have some blood in your urine off and on over the next several weeks. Your urine will be light pink to yellow. Minor bleeding or drainage may occur from the surgical sites; however, excessive or consistent bleeding should be reported to your surgeon. Excessive bleeding is defined as blood that is dripping from wound, soaking you bandages, and is ketchup colored, thick with possible blood clots.  Consistent is defined as bleeding that does not stop.      Treatment/wound care:   Keep area(s) clean and dry.   It is okay to shower 24 hours after time of surgery.    Do not scrub wound(s), pat dry.    Do not submerge wound(s) in standing water until seen for follow up appointment (no tub bathing, swimming, or hot tubs).    Clean with mild soap, gentle washing, pat dry, cover with bandage as needed.    Avoid waterproof bandages.  No oils or lotions on incisions.  Staples/sutures removed in our clinic 10-14 days after the date of surgery.  Do not remove the staples/sutures on your own.    Please visually inspect your wound(s) at least once  daily.  If the wound(s) are in a difficult to see location, please use a mirror or have someone else assist with visual inspection.    Signs of Infection  Signs of infection can include fever, chills, redness around surgical incisions, green/yellow drainage from incisions, burning sensation with passing of urine, or severe abdominal pain.  If you see any of these occur, please contact your doctor's office at [phone].  Any fever higher than 100.4, especially if associated with an ill feeling, abdominal pain, chills, or nausea should be reported to your surgeon.      Assist in bowel movements/urination  Take daily stool softener you were prescribed  Increase fiber in diet  Increase water (6 to 8 glasses)  Increase walking   Can try adding over the counter MiraLAX or in extreme cases milk of magnesia.  If you have tried these methods and you are not passing gas, your bladder still feels full and you cannot have a bowel movement, please go to your nearest Emergency room/contact your physician.    Additional Instructions:   Use a small pillow to put pressure on your belly. This can make you more comfortable when you cough, laugh or do other actions.

## 2024-10-22 NOTE — PROGRESS NOTES
Physical Therapy    Physical Therapy Evaluation & Treatment    Patient Name: Skyler Shelton  MRN: [id number]  Department: [facility]  Room: [address] [place] [address]  Today's Date: 10/22/2024   Time Calculation  Start Time: 1202  Stop Time: 1231  Time Calculation (min): 29 min    Assessment/Plan   PT Assessment  PT Assessment Results: Decreased strength, Decreased endurance, Impaired balance, Decreased mobility, Pain  Rehab Prognosis: Good  End of Session Communication: Bedside nurse  Assessment Comment: Pt is a 44 year old male who presents s/p left open partial nephrectomy on 10/21/24. Pt demonstrates deficits in strength, endurance, balance leading to impairments in functional mobility.  End of Session Patient Position: Bed, 3 rail up, Alarm off, caregiver present   IP OR SWING BED PT PLAN  Inpatient or Swing Bed: Inpatient  PT Plan  Treatment/Interventions: Bed mobility, Transfer training, Gait training, Stair training, Neuromuscular re-education, Balance training, Strengthening, Endurance training, Range of motion, Therapeutic exercise, Therapeutic activity, Home exercise program, Positioning, Postural re-education  PT Plan: Ongoing PT  PT Frequency: 3 times per week  PT Discharge Recommendations: Low intensity level of continued care  Equipment Recommended upon Discharge: Wheeled walker  PT Recommended Transfer Status: Assist x1, Assistive device (FWW)      Subjective     General Visit Information:  General  Reason for Referral: s/p left open partial nephrectomy on 10/21/24  Past Medical History Relevant to Rehab: left renal mass  Family/Caregiver Present: No  Prior to Session Communication: Bedside nurse  Patient Position Received: Bed, 3 rail up, Alarm off, not on at start of session  Preferred Learning Style: verbal, visual  General Comment: Pt is pleasant, cooperative, and willing to participate in therapy  Home Living:  Home Living  Type of Home: House  Lives With: Spouse, Dependent children (wife and 4 children)  Home  Adaptive Equipment: None  Home Layout: Two level, Stairs to alternate level with rails  Home Access: Stairs to enter without rails  Bathroom Shower/Tub: Walk-in shower  Bathroom Toilet: Standard  Bathroom Equipment: Built-in shower seat  Prior Level of Function:  Prior Function Per Pt/Caregiver Report  Level of [place]: Independent with ADLs and functional transfers, Independent with homemaking with ambulation  ADL Assistance: Independent  Homemaking Assistance: Independent  Ambulatory Assistance: Independent  Vocational: Full time employment ()  Leisure: watching kids play sports  Prior Function Comments: no hx of falls, (+) driving  Precautions:  Precautions  Medical Precautions: Fall precautions  Post-Surgical Precautions: Abdominal surgery precautions    Vital Signs (Past 2hrs)        Date/Time Vitals Session Patient Position Pulse Resp SpO2 BP MAP (mmHg)    10/22/24 1233 --  --  102  16  99 %  142/80  --                        Objective   Pain:  Pain Assessment  Pain Assessment: 0-10  0-10 (Numeric) Pain Score: 3  Pain Type: Surgical pain  Pain Location: Abdomen  Cognition:  Cognition  Orientation Level: Oriented X4    General Assessments:  Activity Tolerance  Endurance: Tolerates 10 - 20 min exercise with multiple rests    Sensation  Sensation Comment: denies numbness and tingling       Static Sitting Balance  Static Sitting-Balance Support: Feet supported, Bilateral upper extremity supported  Static Sitting-Level of Assistance: Close supervision  Dynamic Sitting Balance  Dynamic Sitting-Balance Support: Feet supported, Bilateral upper extremity supported  Dynamic Sitting-Level of Assistance: Contact guard    Static Standing Balance  Static Standing-Balance Support: Bilateral upper extremity supported (walker)  Static Standing-Level of Assistance:  (SBA)  Dynamic Standing Balance  Dynamic Standing-Balance Support: Bilateral upper extremity supported (walker)  Dynamic Standing-Level of Assistance:   (SBA)  Functional Assessments:  Bed Mobility  Bed Mobility: Yes  Bed Mobility 1  Bed Mobility 1: Supine to sitting, Sitting to supine  Level of Assistance 1: Minimum assistance, Moderate verbal cues    Transfers  Transfer: Yes  Transfer 1  Transfer From 1: Sit to, Stand to  Transfer to 1: Stand, Sit  Technique 1: Sit to stand, Stand to sit  Transfer Device 1: Walker  Transfer Level of Assistance 1: Contact guard, Moderate verbal cues    Ambulation/Gait Training  Ambulation/Gait Training Performed: Yes  Ambulation/Gait Training 1  Surface 1: Level tile  Device 1: Rolling walker  Assistance 1: Minimal verbal cues (CGA progressing to SBA)  Quality of Gait 1: Inconsistent stride length, Decreased step length    Stairs  Stairs: No  Extremity/Trunk Assessments:  RLE   RLE :  (>/= 3+/5 observed via function)  LLE   LLE :  (>/= 3+/5 observed via function)  Treatments:  Therapeutic Activity  Therapeutic Activity Performed: Yes  Therapeutic Activity 1: Pt performed dynamic standing balance at sink while getting cleaned up for the day for ~8 minutes    Bed Mobility  Bed Mobility: Yes  Bed Mobility 1  Bed Mobility 1: Supine to sitting, Sitting to supine  Level of Assistance 1: Minimum assistance, Moderate verbal cues    Ambulation/Gait Training  Ambulation/Gait Training Performed: Yes  Ambulation/Gait Training 1  Surface 1: Level tile  Device 1: Rolling walker  Assistance 1: Minimal verbal cues (CGA progressing to SBA)  Quality of Gait 1: Inconsistent stride length, Decreased step length  Transfers  Transfer: Yes  Transfer 1  Transfer From 1: Sit to, Stand to  Transfer to 1: Stand, Sit  Technique 1: Sit to stand, Stand to sit  Transfer Device 1: Walker  Transfer Level of Assistance 1: Contact guard, Moderate verbal cues  Outcome Measures:  [facility] Basic Mobility  Turning from your back to your side while in a flat bed without using bedrails: A little  Moving from lying on your back to sitting on the side of a flat bed without  using bedrails: A lot  Moving to and from bed to chair (including a wheelchair): A little  Standing up from a chair using your arms (e.g. wheelchair or bedside chair): A little  To walk in hospital room: A little  Climbing 3-5 steps with railing: A little  Basic Mobility - Total Score: 17    Encounter Problems       Encounter Problems (Active)       Mobility       STG - Patient will ambulate >150 ft with mod I and LRD with no LOB, progress as able and appropriate        Start:  10/22/24    Expected End:  11/05/24            STG - Patient will ascend and descend a flight of stairs with mod I and LRD        Start:  10/22/24    Expected End:  11/05/24            Pt will ambulate 150 ft with mod I and LRD and no signs of fatigue or SOB        Start:  10/22/24    Expected End:  11/05/24               PT Transfers       STG - Patient will perform bed mobility independently        Start:  10/22/24    Expected End:  11/05/24            STG - Patient will transfer sit to and from stand with mod I and LRD       Start:  10/22/24    Expected End:  11/05/24                   Education Documentation  Precautions, taught by Abigail Hinkle PT at 10/22/2024  2:18 PM.  Learner: Patient  Readiness: Acceptance  Method: Explanation  Response: Verbalizes Understanding  Comment: bed mobility, transfers, gait, abdominal precautions, activity and symptom monitoring, importance of activity during hospital stay and at home    Mobility Training, taught by Abigail Hinkle PT at 10/22/2024  2:18 PM.  Learner: Patient  Readiness: Acceptance  Method: Explanation  Response: Verbalizes Understanding  Comment: bed mobility, transfers, gait, abdominal precautions, activity and symptom monitoring, importance of activity during hospital stay and at home    Education Comments  No comments found.      10/22/24 at 2:21 PM - Abigail Hinkle PT

## 2024-10-22 NOTE — PROGRESS NOTES
10/22/24 1400   Discharge Planning   Living Arrangements Spouse/significant other   Support Systems Spouse/significant other   Assistance Needed none   Type of Residence Private residence   Number of Stairs to Enter Residence 3   Number of Stairs Within Residence 15   Do you have animals or pets at home? Yes   Type of Animals or Pets dog   Who is requesting discharge planning? Provider   Home or Post Acute Services None   Expected Discharge Disposition Home   Does the patient need discharge transport arranged? No   Financial Resource Strain   How hard is it for you to pay for the very basics like food, housing, medical care, and heating? Not hard   Housing Stability   In the last 12 months, was there a time when you were not able to pay the mortgage or rent on time? N   In the past 12 months, how many times have you moved where you were living? 0   Transportation Needs   In the past 12 months, has lack of transportation kept you from medical appointments or from getting medications? no   In the past 12 months, has lack of transportation kept you from meetings, work, or from getting things needed for daily living? No     TCC met with patient at bedside to discuss anticipated discharge needs. Verified demographics and insurance with patient. Patient is agreeable to home PT. Patient is agreeable to using [facility] upon discharge. Will continue to follow patient for discharge needs.  Sintia De La Cruz RN TCC

## 2024-10-23 ENCOUNTER — HOME HEALTH ADMISSION (OUTPATIENT)
Dept: HOME HEALTH SERVICES | Facility: HOME HEALTH | Age: 44
End: 2024-10-23
Payer: COMMERCIAL

## 2024-10-23 ENCOUNTER — DOCUMENTATION (OUTPATIENT)
Dept: HOME HEALTH SERVICES | Facility: HOME HEALTH | Age: 44
End: 2024-10-23
Payer: COMMERCIAL

## 2024-10-23 VITALS
WEIGHT: 240.3 LBS | HEIGHT: 68 IN | HEART RATE: 91 BPM | TEMPERATURE: 97.5 F | BODY MASS INDEX: 36.42 KG/M2 | OXYGEN SATURATION: 97 % | SYSTOLIC BLOOD PRESSURE: 130 MMHG | RESPIRATION RATE: 16 BRPM | DIASTOLIC BLOOD PRESSURE: 86 MMHG

## 2024-10-23 LAB
ANION GAP SERPL CALC-SCNC: 11 MMOL/L (ref 10–20)
BUN SERPL-MCNC: 24 MG/DL (ref 6–23)
CALCIUM SERPL-MCNC: 8.9 MG/DL (ref 8.6–10.6)
CHLORIDE SERPL-SCNC: 103 MMOL/L (ref 98–107)
CO2 SERPL-SCNC: 28 MMOL/L (ref 21–32)
CREAT FLD-MCNC: 1.48 MG/DL
CREAT SERPL-MCNC: 1.18 MG/DL (ref 0.5–1.3)
EGFRCR SERPLBLD CKD-EPI 2021: 78 ML/MIN/1.73M*2
ERYTHROCYTE [DISTWIDTH] IN BLOOD BY AUTOMATED COUNT: 12.6 % (ref 11.5–14.5)
GLUCOSE SERPL-MCNC: 110 MG/DL (ref 74–99)
HCT VFR BLD AUTO: 35.2 % (ref 41–52)
HGB BLD-MCNC: 12.1 G/DL (ref 13.5–17.5)
MCH RBC QN AUTO: 29.4 PG (ref 26–34)
MCHC RBC AUTO-ENTMCNC: 34.4 G/DL (ref 32–36)
MCV RBC AUTO: 86 FL (ref 80–100)
NRBC BLD-RTO: 0 /100 WBCS (ref 0–0)
PLATELET # BLD AUTO: 269 X10*3/UL (ref 150–450)
POTASSIUM SERPL-SCNC: 3.1 MMOL/L (ref 3.5–5.3)
RBC # BLD AUTO: 4.11 X10*6/UL (ref 4.5–5.9)
SODIUM SERPL-SCNC: 139 MMOL/L (ref 136–145)
WBC # BLD AUTO: 11.8 X10*3/UL (ref 4.4–11.3)

## 2024-10-23 PROCEDURE — 2500000002 HC RX 250 W HCPCS SELF ADMINISTERED DRUGS (ALT 637 FOR MEDICARE OP, ALT 636 FOR OP/ED)

## 2024-10-23 PROCEDURE — 2500000005 HC RX 250 GENERAL PHARMACY W/O HCPCS: Performed by: UROLOGY

## 2024-10-23 PROCEDURE — 82570 ASSAY OF URINE CREATININE: CPT | Performed by: STUDENT IN AN ORGANIZED HEALTH CARE EDUCATION/TRAINING PROGRAM

## 2024-10-23 PROCEDURE — 36415 COLL VENOUS BLD VENIPUNCTURE: CPT | Performed by: STUDENT IN AN ORGANIZED HEALTH CARE EDUCATION/TRAINING PROGRAM

## 2024-10-23 PROCEDURE — 2500000001 HC RX 250 WO HCPCS SELF ADMINISTERED DRUGS (ALT 637 FOR MEDICARE OP): Performed by: STUDENT IN AN ORGANIZED HEALTH CARE EDUCATION/TRAINING PROGRAM

## 2024-10-23 PROCEDURE — 82374 ASSAY BLOOD CARBON DIOXIDE: CPT | Performed by: STUDENT IN AN ORGANIZED HEALTH CARE EDUCATION/TRAINING PROGRAM

## 2024-10-23 PROCEDURE — 2500000001 HC RX 250 WO HCPCS SELF ADMINISTERED DRUGS (ALT 637 FOR MEDICARE OP)

## 2024-10-23 PROCEDURE — 85027 COMPLETE CBC AUTOMATED: CPT | Performed by: STUDENT IN AN ORGANIZED HEALTH CARE EDUCATION/TRAINING PROGRAM

## 2024-10-23 PROCEDURE — 2500000004 HC RX 250 GENERAL PHARMACY W/ HCPCS (ALT 636 FOR OP/ED): Performed by: STUDENT IN AN ORGANIZED HEALTH CARE EDUCATION/TRAINING PROGRAM

## 2024-10-23 PROCEDURE — 97116 GAIT TRAINING THERAPY: CPT | Mod: GP

## 2024-10-23 RX ORDER — ACETAMINOPHEN 325 MG/1
975 TABLET ORAL EVERY 6 HOURS
Start: 2024-10-23

## 2024-10-23 RX ORDER — POTASSIUM CHLORIDE 20 MEQ/1
40 TABLET, EXTENDED RELEASE ORAL 2 TIMES DAILY
Status: DISCONTINUED | OUTPATIENT
Start: 2024-10-23 | End: 2024-10-23 | Stop reason: HOSPADM

## 2024-10-23 ASSESSMENT — COGNITIVE AND FUNCTIONAL STATUS - GENERAL
DRESSING REGULAR UPPER BODY CLOTHING: A LITTLE
PERSONAL GROOMING: A LITTLE
MOVING TO AND FROM BED TO CHAIR: A LITTLE
TURNING FROM BACK TO SIDE WHILE IN FLAT BAD: A LITTLE
WALKING IN HOSPITAL ROOM: A LITTLE
MOBILITY SCORE: 19
DAILY ACTIVITIY SCORE: 18
DRESSING REGULAR LOWER BODY CLOTHING: A LITTLE
STANDING UP FROM CHAIR USING ARMS: A LITTLE
TOILETING: A LITTLE
STANDING UP FROM CHAIR USING ARMS: A LITTLE
CLIMB 3 TO 5 STEPS WITH RAILING: A LITTLE
EATING MEALS: A LITTLE
TURNING FROM BACK TO SIDE WHILE IN FLAT BAD: A LITTLE
MOVING TO AND FROM BED TO CHAIR: A LITTLE
HELP NEEDED FOR BATHING: A LITTLE
WALKING IN HOSPITAL ROOM: A LITTLE

## 2024-10-23 ASSESSMENT — PAIN SCALES - GENERAL: PAINLEVEL_OUTOF10: 6

## 2024-10-23 ASSESSMENT — PAIN - FUNCTIONAL ASSESSMENT: PAIN_FUNCTIONAL_ASSESSMENT: 0-10

## 2024-10-23 NOTE — HH CARE COORDINATION
Home Care received a Referral for Physical Therapy. We have processed the referral for a Start of Care on 10/24-10/25.     If you have any questions or concerns, please feel free to contact us at [phone]. Follow the prompts, enter your five digit zip code, and you will be directed to your care team on [facility].

## 2024-10-23 NOTE — PROGRESS NOTES
Physical Therapy    Physical Therapy Treatment    Patient Name: Skyler Shelton  MRN: [id number]  Department: [facility]  Room: [address] [place], [place] [id number]-  Today's Date: 10/23/2024  Time Calculation  Start Time: 1000  Stop Time: 1009  Time Calculation (min): 9 min         Assessment/Plan   PT Assessment  PT Assessment Results: Decreased strength, Decreased endurance, Impaired balance, Decreased mobility, Pain  Rehab Prognosis: Good  End of Session Communication: Bedside nurse  Assessment Comment: Pt demonstrates ability to perform functional mobility with SBA and perform stairs this date. Pt does not require further PT during hospital stay. Will d/c PT orders at this time  End of Session Patient Position: Bed, 3 rail up, Alarm off, not on at start of session     PT Plan  Treatment/Interventions: Bed mobility, Transfer training, Gait training, Stair training, Neuromuscular re-education, Balance training, Strengthening, Endurance training, Range of motion, Therapeutic exercise, Therapeutic activity, Home exercise program, Positioning, Postural re-education  PT Plan: Ongoing PT  PT Frequency: Other (Comment) (no further acute care PT needed)  PT Discharge Recommendations: Low intensity level of continued care  Equipment Recommended upon Discharge: Wheeled walker  PT Recommended Transfer Status: Assist x1, Assistive device (FWW)      General Visit Information:   PT  Visit  PT Received On: 10/23/24  General  Reason for Referral: s/p left open partial nephrectomy on 10/21/24  Past Medical History Relevant to Rehab: left renal mass  Family/Caregiver Present: No  Prior to Session Communication: Bedside nurse  Patient Position Received: Bed, 3 rail up, Alarm off, not on at start of session  Preferred Learning Style: verbal, visual  General Comment: Pt is pleasant, cooperative, and willing to participate in therapy    Subjective   Precautions:  Precautions  Medical Precautions: Fall precautions  Post-Surgical Precautions: Abdominal surgery  precautions            Objective   Pain:  Pain Assessment  Pain Assessment: 0-10  0-10 (Numeric) Pain Score: 6  Pain Type: Surgical pain  Pain Location: Abdomen  Pain Orientation: Left  Cognition:  Cognition  Overall Cognitive Status: Within Functional Limits  Orientation Level: Oriented X4  Coordination:  Movements are Fluid and Coordinated: Yes  Postural Control:  Static Sitting Balance  Static Sitting-Balance Support: Feet supported, Bilateral upper extremity supported  Static Sitting-Level of Assistance: Close supervision  Dynamic Sitting Balance  Dynamic Sitting-Balance Support: Feet supported, Bilateral upper extremity supported  Dynamic Sitting-Level of Assistance: Close supervision  Static Standing Balance  Static Standing-Balance Support: No upper extremity supported  Static Standing-Level of Assistance: Close supervision  Dynamic Standing Balance  Dynamic Standing-Balance Support: No upper extremity supported  Dynamic Standing-Level of Assistance:  (SBA)    Activity Tolerance:  Activity Tolerance  Endurance: Tolerates 10 - 20 min exercise with multiple rests  Treatments:  Bed Mobility  Bed Mobility: Yes  Bed Mobility 1  Bed Mobility 1: Supine to sitting, Sitting to supine  Level of Assistance 1: Minimal verbal cues (SBA)  Bed Mobility Comments 1: VCs, cues for log roll, HOB elevated    Ambulation/Gait Training  Ambulation/Gait Training Performed: Yes  Ambulation/Gait Training 1  Surface 1: Level tile  Device 1: No device  Assistance 1: Minimal verbal cues (SBA)  Quality of Gait 1: Inconsistent stride length, Decreased step length  Comments/Distance (ft) 1: 200 ft, 350 ft  Transfers  Transfer: Yes  Transfer 1  Transfer From 1: Sit to, Stand to  Transfer to 1: Stand, Sit  Technique 1: Sit to stand, Stand to sit  Transfer Device 1: Walker  Transfer Level of Assistance 1: Minimal verbal cues (SBA)  Trials/Comments 1: VCs, good control of COM over NIKHIL    Stairs  Stairs: Yes  Stairs  Rails 1: Right  Device 1: No  "device  Assistance 1: Contact guard, Maximum assistance  Comment/Number of Steps 1: 6 stairs (cues for \"up with the good, down with the bad\" in relation to L surgical side, nonreciprocal pattern)    Outcome Measures:  [facility] Basic Mobility  Turning from your back to your side while in a flat bed without using bedrails: None  Moving from lying on your back to sitting on the side of a flat bed without using bedrails: A little  Moving to and from bed to chair (including a wheelchair): A little  Standing up from a chair using your arms (e.g. wheelchair or bedside chair): A little  To walk in hospital room: A little  Climbing 3-5 steps with railing: A little  Basic Mobility - Total Score: 19    Education Documentation  Precautions, taught by Abigail Hinkle PT at 10/23/2024 10:38 AM.  Learner: Patient  Readiness: Acceptance  Method: Explanation  Response: Verbalizes Understanding  Comment: bed mobility, transfers, gait, abdominal precautions, stairs    Mobility Training, taught by Abigail Hinkle PT at 10/23/2024 10:38 AM.  Learner: Patient  Readiness: Acceptance  Method: Explanation  Response: Verbalizes Understanding  Comment: bed mobility, transfers, gait, abdominal precautions, stairs    Education Comments  No comments found.        OP EDUCATION:       Encounter Problems       Encounter Problems (Active)       Mobility       STG - Patient will ambulate >150 ft with mod I and LRD with no LOB, progress as able and appropriate  (Progressing)       Start:  10/22/24    Expected End:  11/05/24            STG - Patient will ascend and descend a flight of stairs with mod I and LRD  (Progressing)       Start:  10/22/24    Expected End:  11/05/24            Pt will ambulate 150 ft with mod I and LRD and no signs of fatigue or SOB  (Progressing)       Start:  10/22/24    Expected End:  11/05/24               PT Transfers       STG - Patient will perform bed mobility independently  (Progressing)       Start:  10/22/24    " Expected End:  11/05/24            STG - Patient will transfer sit to and from stand with mod I and LRD (Progressing)       Start:  10/22/24    Expected End:  11/05/24                   10/23/24 at 10:40 AM - Abigail Hinkle, PT

## 2024-10-23 NOTE — CARE PLAN
The patient's goals for the shift include pain mangement    The clinical goals for the shift include pt will have improve pain management    Problem: Skin  Goal: Promote/optimize nutrition  Flowsheets (Taken 10/23/2024 7022)  Promote/optimize nutrition:   Discuss with provider if NPO > 2 days   Offer water/supplements/favorite foods     Problem: Skin  Goal: Participates in plan/prevention/treatment measures  Flowsheets (Taken 10/23/2024 8969)  Participates in plan/prevention/treatment measures:   Increase activity/out of bed for meals   Discuss with provider PT/OT consult

## 2024-10-23 NOTE — DISCHARGE SUMMARY
Discharge Diagnosis  Left renal mass    Issues Requiring Follow-Up      Test Results Pending At Discharge  Pending Labs       Order Current Status    Surgical Pathology Exam In process            Hospital Course  Patient is a 44M w/ PMHx notable for left renal mass now s/p left open partial nephrectomy on 10/21/24 w/ Dr. Shannon. Post-operatively, patient is recovering well. Kennedy discharged 10/22 and passed trial of void. CRISTIAN creatinine obtained and resulted as 1.48. CRISTIAN drain discharged and patient to be discharged home today with outpatient follow up with Dr. Shannon 11/14/24.    Pertinent Physical Exam At Time of Discharge  Physical Exam    GEN: NAD  HEENT: trachea midline, no scleral icterus  CV: RRR  Pulm: non labored breathing ORA  Abd: s/nt/nd  : Voiding spontaneously. Left flank incision c/d/I well approximated closed with suture and dermabond   Psych: appropriate mood and affect.      Home Medications     Medication List      START taking these medications     methocarbamol 500 mg tablet; Commonly known as: Robaxin; Take 1 tablet   (500 mg) by mouth every 6 hours for 5 days.   oxyCODONE 5 mg immediate release tablet; Commonly known as: Roxicodone;   Take 1 tablet (5 mg) by mouth every 6 hours if needed for severe pain (7 -   10) for up to 3 days.   polyethylene glycol 17 gram packet; Commonly known as: Glycolax,   Miralax; Take 17 g by mouth once daily for 7 days.     CONTINUE taking these medications     * DULoxetine 30 mg DR capsule; Commonly known as: Cymbalta   * DULoxetine 60 mg DR capsule; Commonly known as: Cymbalta   ERGOCALCIFEROL (VITAMIN D2) ORAL   hydroCHLOROthiazide 25 mg tablet; Commonly known as: HYDRODiuril   losartan 100 mg tablet; Commonly known as: Cozaar   topiramate 25 mg tablet; Commonly known as: Topamax  * This list has 2 medication(s) that are the same as other medications   prescribed for you. Read the directions carefully, and ask your doctor or   other care provider to review them  with you.     ASK your doctor about these medications     sildenafil 50 mg tablet; Commonly known as: Viagra       Outpatient Follow-Up  Future Appointments   Date Time Provider Department Center   11/14/2024  2:20 PM MD ONOFRE Torres MD

## 2024-10-24 ENCOUNTER — PATIENT OUTREACH (OUTPATIENT)
Dept: CARE COORDINATION | Facility: CLINIC | Age: 44
End: 2024-10-24
Payer: COMMERCIAL

## 2024-10-24 SDOH — ECONOMIC STABILITY: FOOD INSECURITY
ARE ANY OF YOUR NEEDS URGENT? FOR EXAMPLE, UNCERTAINTY OF WHERE YOU WILL GET YOUR NEXT MEAL OR NOT HAVING THE MEDICATIONS YOU NEED TO TAKE TOMORROW.: NO

## 2024-10-24 SDOH — ECONOMIC STABILITY: GENERAL: WOULD YOU LIKE HELP WITH ANY OF THE FOLLOWING NEEDS?: I DONT NEED HELP WITH ANY OF THESE

## 2024-10-24 NOTE — PROGRESS NOTES
Outreach call to patient to support a smooth transition of care from recent admission.  Spoke with patient, reviewed discharge medications, discharge instructions, assessed social needs, and provided education on importance of follow-up appointment with provider.  Will continue to monitor through transition period.Has follow up scheduled with Dr. Shannon. He also put in labs that he would like home care to draw see after visit summary.       Discharge Diagnosis  Left renal mass     Issues Requiring Follow-Up  Medications  Medications reviewed with patient/caregiver?: Yes (10/24/2024  1:03 PM)  Is the patient having any side effects they believe may be caused by any medication additions or changes?: No (10/24/2024  1:03 PM)  Does the patient have all medications ordered at discharge?: Yes (10/24/2024  1:03 PM)  Care Management Interventions: Provided patient education (10/24/2024  1:03 PM)  Is the patient taking all medications as directed (includes completed medication regime)?: Yes (10/24/2024  1:03 PM)    Appointments  Does the patient have a primary care provider?: Yes (10/24/2024  1:03 PM)  Care Management Interventions: Educated patient on importance of making appointment (10/24/2024  1:03 PM)  Has the patient kept scheduled appointments due by today?: Yes (10/24/2024  1:03 PM)    Self Management  What is the home health agency?: [facility] (10/24/2024  1:03 PM)  Has home health visited the patient within 72 hours of discharge?: Not applicable (10/24/2024  1:03 PM)  What Durable Medical Equipment (DME) was ordered?: none for Raymond Kennedy and yvonne drain dc prior to discharged  . Patient has drainage from site of YVONNE drain. Discussed same and what to look for if he needs to call Dr. Shannon. (10/24/2024  1:03 PM)    Patient Teaching  Care Management Interventions: Reviewed instructions with patient (10/24/2024  1:03 PM)  What is the patient's perception of their health status since discharge?: Improving (10/24/2024  1:03  PM)  Patient/Caregiver Education Comments: discussed urine/You could have some blood in your urine off and on over the next several weeks. Your urine will be light pink to yellow.  Minor bleeding or drainage may occur from the surgical sites; however, excessive or consistent bleeding should be  reported to your surgeon. Excessive bleeding is defined as blood that is dripping from wound, soaking you bandages,  and is ketchup colored, thick with possible blood clots. Consistent is defined as bleeding that does not stop. (10/24/2024  1:03 PM)              Patient can refer also to his After visit summary -    Nurse Care Manager   [facility] Accountable Care Department   [phone]

## 2024-10-25 ENCOUNTER — HOME CARE VISIT (OUTPATIENT)
Dept: HOME HEALTH SERVICES | Facility: HOME HEALTH | Age: 44
End: 2024-10-25

## 2024-10-25 NOTE — CASE COMMUNICATION
TC to pt.  He is ambulating well and doing steps. He declined need for PT. He did ask about his drainage. Pt spoke to both Unique and Agatha NP. Pt aware what to do with covering drainage from removal of surgical tube.  Pt will get labs drawn Monday.  Pt will be a non admit to homecare.

## 2024-10-28 ENCOUNTER — LAB (OUTPATIENT)
Dept: LAB | Facility: LAB | Age: 44
End: 2024-10-28
Payer: COMMERCIAL

## 2024-10-28 ENCOUNTER — APPOINTMENT (OUTPATIENT)
Dept: RADIOLOGY | Facility: HOSPITAL | Age: 44
End: 2024-10-28
Payer: COMMERCIAL

## 2024-10-28 ENCOUNTER — PATIENT MESSAGE (OUTPATIENT)
Dept: UROLOGY | Facility: HOSPITAL | Age: 44
End: 2024-10-28

## 2024-10-28 ENCOUNTER — HOSPITAL ENCOUNTER (EMERGENCY)
Facility: HOSPITAL | Age: 44
Discharge: HOME | End: 2024-10-29
Attending: EMERGENCY MEDICINE
Payer: COMMERCIAL

## 2024-10-28 DIAGNOSIS — T81.49XA CELLULITIS, WOUND, POST-OPERATIVE: Primary | ICD-10-CM

## 2024-10-28 DIAGNOSIS — S30.1XXA ABDOMINAL WALL SEROMA, INITIAL ENCOUNTER: ICD-10-CM

## 2024-10-28 DIAGNOSIS — N28.89 RENAL MASS, LEFT: ICD-10-CM

## 2024-10-28 LAB
ALBUMIN SERPL BCP-MCNC: 4.2 G/DL (ref 3.4–5)
ALP SERPL-CCNC: 64 U/L (ref 33–120)
ALT SERPL W P-5'-P-CCNC: 43 U/L (ref 10–52)
ANION GAP SERPL CALC-SCNC: 11 MMOL/L (ref 10–20)
ANION GAP SERPL CALC-SCNC: 14 MMOL/L (ref 10–20)
AST SERPL W P-5'-P-CCNC: 29 U/L (ref 9–39)
BASOPHILS # BLD AUTO: 0.06 X10*3/UL (ref 0–0.1)
BASOPHILS NFR BLD AUTO: 0.5 %
BILIRUB SERPL-MCNC: 0.4 MG/DL (ref 0–1.2)
BUN SERPL-MCNC: 16 MG/DL (ref 6–23)
BUN SERPL-MCNC: 18 MG/DL (ref 6–23)
CALCIUM SERPL-MCNC: 9 MG/DL (ref 8.6–10.3)
CALCIUM SERPL-MCNC: 9.2 MG/DL (ref 8.6–10.3)
CHLORIDE SERPL-SCNC: 93 MMOL/L (ref 98–107)
CHLORIDE SERPL-SCNC: 94 MMOL/L (ref 98–107)
CO2 SERPL-SCNC: 29 MMOL/L (ref 21–32)
CO2 SERPL-SCNC: 30 MMOL/L (ref 21–32)
CREAT SERPL-MCNC: 1.15 MG/DL (ref 0.5–1.3)
CREAT SERPL-MCNC: 1.19 MG/DL (ref 0.5–1.3)
EGFRCR SERPLBLD CKD-EPI 2021: 77 ML/MIN/1.73M*2
EGFRCR SERPLBLD CKD-EPI 2021: 80 ML/MIN/1.73M*2
EOSINOPHIL # BLD AUTO: 0.36 X10*3/UL (ref 0–0.7)
EOSINOPHIL NFR BLD AUTO: 2.8 %
ERYTHROCYTE [DISTWIDTH] IN BLOOD BY AUTOMATED COUNT: 12.6 % (ref 11.5–14.5)
ERYTHROCYTE [DISTWIDTH] IN BLOOD BY AUTOMATED COUNT: 12.8 % (ref 11.5–14.5)
GLUCOSE SERPL-MCNC: 89 MG/DL (ref 74–99)
GLUCOSE SERPL-MCNC: 96 MG/DL (ref 74–99)
HCT VFR BLD AUTO: 36.9 % (ref 41–52)
HCT VFR BLD AUTO: 37.8 % (ref 41–52)
HGB BLD-MCNC: 12.8 G/DL (ref 13.5–17.5)
HGB BLD-MCNC: 12.9 G/DL (ref 13.5–17.5)
IMM GRANULOCYTES # BLD AUTO: 0.24 X10*3/UL (ref 0–0.7)
IMM GRANULOCYTES NFR BLD AUTO: 1.9 % (ref 0–0.9)
LACTATE SERPL-SCNC: 1.1 MMOL/L (ref 0.4–2)
LYMPHOCYTES # BLD AUTO: 2.94 X10*3/UL (ref 1.2–4.8)
LYMPHOCYTES NFR BLD AUTO: 23 %
MCH RBC QN AUTO: 29.1 PG (ref 26–34)
MCH RBC QN AUTO: 29.1 PG (ref 26–34)
MCHC RBC AUTO-ENTMCNC: 34.1 G/DL (ref 32–36)
MCHC RBC AUTO-ENTMCNC: 34.7 G/DL (ref 32–36)
MCV RBC AUTO: 84 FL (ref 80–100)
MCV RBC AUTO: 85 FL (ref 80–100)
MONOCYTES # BLD AUTO: 1.47 X10*3/UL (ref 0.1–1)
MONOCYTES NFR BLD AUTO: 11.5 %
NEUTROPHILS # BLD AUTO: 7.73 X10*3/UL (ref 1.2–7.7)
NEUTROPHILS NFR BLD AUTO: 60.3 %
NRBC BLD-RTO: 0 /100 WBCS (ref 0–0)
NRBC BLD-RTO: 0 /100 WBCS (ref 0–0)
PLATELET # BLD AUTO: 426 X10*3/UL (ref 150–450)
PLATELET # BLD AUTO: 462 X10*3/UL (ref 150–450)
POTASSIUM SERPL-SCNC: 3.1 MMOL/L (ref 3.5–5.3)
POTASSIUM SERPL-SCNC: 3.3 MMOL/L (ref 3.5–5.3)
PROT SERPL-MCNC: 7.5 G/DL (ref 6.4–8.2)
RBC # BLD AUTO: 4.4 X10*6/UL (ref 4.5–5.9)
RBC # BLD AUTO: 4.43 X10*6/UL (ref 4.5–5.9)
SODIUM SERPL-SCNC: 130 MMOL/L (ref 136–145)
SODIUM SERPL-SCNC: 135 MMOL/L (ref 136–145)
WBC # BLD AUTO: 12.8 X10*3/UL (ref 4.4–11.3)
WBC # BLD AUTO: 12.8 X10*3/UL (ref 4.4–11.3)

## 2024-10-28 PROCEDURE — 36415 COLL VENOUS BLD VENIPUNCTURE: CPT

## 2024-10-28 PROCEDURE — 74177 CT ABD & PELVIS W/CONTRAST: CPT | Performed by: RADIOLOGY

## 2024-10-28 PROCEDURE — 85027 COMPLETE CBC AUTOMATED: CPT

## 2024-10-28 PROCEDURE — 99284 EMERGENCY DEPT VISIT MOD MDM: CPT | Mod: 25

## 2024-10-28 PROCEDURE — 2550000001 HC RX 255 CONTRASTS: Performed by: EMERGENCY MEDICINE

## 2024-10-28 PROCEDURE — 80048 BASIC METABOLIC PNL TOTAL CA: CPT

## 2024-10-28 PROCEDURE — 74177 CT ABD & PELVIS W/CONTRAST: CPT

## 2024-10-28 PROCEDURE — 85025 COMPLETE CBC W/AUTO DIFF WBC: CPT | Performed by: EMERGENCY MEDICINE

## 2024-10-28 PROCEDURE — 83605 ASSAY OF LACTIC ACID: CPT | Performed by: EMERGENCY MEDICINE

## 2024-10-28 PROCEDURE — 81001 URINALYSIS AUTO W/SCOPE: CPT | Performed by: EMERGENCY MEDICINE

## 2024-10-28 PROCEDURE — 36415 COLL VENOUS BLD VENIPUNCTURE: CPT | Performed by: EMERGENCY MEDICINE

## 2024-10-28 PROCEDURE — 80053 COMPREHEN METABOLIC PANEL: CPT

## 2024-10-28 PROCEDURE — 84075 ASSAY ALKALINE PHOSPHATASE: CPT | Performed by: EMERGENCY MEDICINE

## 2024-10-28 ASSESSMENT — LIFESTYLE VARIABLES
EVER FELT BAD OR GUILTY ABOUT YOUR DRINKING: NO
TOTAL SCORE: 0
HAVE PEOPLE ANNOYED YOU BY CRITICIZING YOUR DRINKING: NO
EVER HAD A DRINK FIRST THING IN THE MORNING TO STEADY YOUR NERVES TO GET RID OF A HANGOVER: NO
HAVE YOU EVER FELT YOU SHOULD CUT DOWN ON YOUR DRINKING: NO

## 2024-10-28 ASSESSMENT — COLUMBIA-SUICIDE SEVERITY RATING SCALE - C-SSRS
1. IN THE PAST MONTH, HAVE YOU WISHED YOU WERE DEAD OR WISHED YOU COULD GO TO SLEEP AND NOT WAKE UP?: NO
2. HAVE YOU ACTUALLY HAD ANY THOUGHTS OF KILLING YOURSELF?: NO
6. HAVE YOU EVER DONE ANYTHING, STARTED TO DO ANYTHING, OR PREPARED TO DO ANYTHING TO END YOUR LIFE?: NO

## 2024-10-28 ASSESSMENT — PAIN DESCRIPTION - LOCATION: LOCATION: ABDOMEN

## 2024-10-28 ASSESSMENT — PAIN DESCRIPTION - ORIENTATION: ORIENTATION: LEFT

## 2024-10-28 ASSESSMENT — PAIN SCALES - GENERAL: PAINLEVEL_OUTOF10: 8

## 2024-10-28 ASSESSMENT — PAIN - FUNCTIONAL ASSESSMENT: PAIN_FUNCTIONAL_ASSESSMENT: 0-10

## 2024-10-28 ASSESSMENT — PAIN DESCRIPTION - PAIN TYPE: TYPE: ACUTE PAIN

## 2024-10-29 VITALS
DIASTOLIC BLOOD PRESSURE: 79 MMHG | TEMPERATURE: 98.8 F | HEART RATE: 89 BPM | HEIGHT: 68 IN | SYSTOLIC BLOOD PRESSURE: 112 MMHG | WEIGHT: 235 LBS | OXYGEN SATURATION: 98 % | RESPIRATION RATE: 16 BRPM | BODY MASS INDEX: 35.61 KG/M2

## 2024-10-29 DIAGNOSIS — G89.18 POST-OP PAIN: ICD-10-CM

## 2024-10-29 DIAGNOSIS — T88.8XXA FLUID COLLECTION AT SURGICAL SITE, INITIAL ENCOUNTER: ICD-10-CM

## 2024-10-29 DIAGNOSIS — N28.89 RENAL MASS, LEFT: ICD-10-CM

## 2024-10-29 LAB
APPEARANCE UR: CLEAR
BILIRUB UR STRIP.AUTO-MCNC: NEGATIVE MG/DL
COLOR UR: ABNORMAL
GLUCOSE UR STRIP.AUTO-MCNC: NORMAL MG/DL
HOLD SPECIMEN: NORMAL
KETONES UR STRIP.AUTO-MCNC: NEGATIVE MG/DL
LEUKOCYTE ESTERASE UR QL STRIP.AUTO: NEGATIVE
NITRITE UR QL STRIP.AUTO: NEGATIVE
PH UR STRIP.AUTO: 6 [PH]
PROT UR STRIP.AUTO-MCNC: NEGATIVE MG/DL
RBC # UR STRIP.AUTO: ABNORMAL /UL
RBC #/AREA URNS AUTO: ABNORMAL /HPF
SP GR UR STRIP.AUTO: 1.05
UROBILINOGEN UR STRIP.AUTO-MCNC: NORMAL MG/DL
WBC #/AREA URNS AUTO: ABNORMAL /HPF

## 2024-10-29 PROCEDURE — 2500000001 HC RX 250 WO HCPCS SELF ADMINISTERED DRUGS (ALT 637 FOR MEDICARE OP): Performed by: EMERGENCY MEDICINE

## 2024-10-29 RX ORDER — OXYCODONE HYDROCHLORIDE 5 MG/1
5 TABLET ORAL EVERY 6 HOURS PRN
Qty: 6 TABLET | Refills: 0 | Status: SHIPPED | OUTPATIENT
Start: 2024-10-29 | End: 2024-10-30 | Stop reason: SDUPTHER

## 2024-10-29 RX ORDER — DOXYCYCLINE 100 MG/1
100 CAPSULE ORAL 2 TIMES DAILY
Qty: 20 CAPSULE | Refills: 0 | Status: SHIPPED | OUTPATIENT
Start: 2024-10-29 | End: 2024-11-08

## 2024-10-29 RX ORDER — DOXYCYCLINE 100 MG/1
100 CAPSULE ORAL ONCE
Status: COMPLETED | OUTPATIENT
Start: 2024-10-29 | End: 2024-10-29

## 2024-10-30 DIAGNOSIS — N28.89 RENAL MASS, LEFT: ICD-10-CM

## 2024-10-30 DIAGNOSIS — G89.18 POST-OP PAIN: ICD-10-CM

## 2024-10-30 RX ORDER — OXYCODONE HYDROCHLORIDE 5 MG/1
5 TABLET ORAL EVERY 6 HOURS PRN
Qty: 6 TABLET | Refills: 0 | Status: SHIPPED | OUTPATIENT
Start: 2024-10-30

## 2024-10-31 ENCOUNTER — HOSPITAL ENCOUNTER (OUTPATIENT)
Dept: RADIOLOGY | Facility: HOSPITAL | Age: 44
Discharge: HOME | End: 2024-10-31
Payer: COMMERCIAL

## 2024-10-31 VITALS
HEART RATE: 94 BPM | SYSTOLIC BLOOD PRESSURE: 96 MMHG | OXYGEN SATURATION: 97 % | RESPIRATION RATE: 18 BRPM | WEIGHT: 235 LBS | TEMPERATURE: 99.1 F | DIASTOLIC BLOOD PRESSURE: 67 MMHG | HEIGHT: 68 IN | BODY MASS INDEX: 35.61 KG/M2

## 2024-10-31 DIAGNOSIS — T88.8XXA FLUID COLLECTION AT SURGICAL SITE, INITIAL ENCOUNTER: ICD-10-CM

## 2024-10-31 LAB
CLARITY FLD: ABNORMAL
COLOR FLD: ABNORMAL
CREAT FLD-MCNC: 7.62 MG/DL
RBC # FLD AUTO: ABNORMAL /UL
WBC # FLD AUTO: ABNORMAL /UL

## 2024-10-31 PROCEDURE — 89050 BODY FLUID CELL COUNT: CPT | Performed by: STUDENT IN AN ORGANIZED HEALTH CARE EDUCATION/TRAINING PROGRAM

## 2024-10-31 PROCEDURE — 2500000004 HC RX 250 GENERAL PHARMACY W/ HCPCS (ALT 636 FOR OP/ED): Performed by: STUDENT IN AN ORGANIZED HEALTH CARE EDUCATION/TRAINING PROGRAM

## 2024-10-31 PROCEDURE — 87075 CULTR BACTERIA EXCEPT BLOOD: CPT | Mod: AHULAB | Performed by: STUDENT IN AN ORGANIZED HEALTH CARE EDUCATION/TRAINING PROGRAM

## 2024-10-31 PROCEDURE — 49405 IMAGE CATH FLUID COLXN VISC: CPT | Performed by: STUDENT IN AN ORGANIZED HEALTH CARE EDUCATION/TRAINING PROGRAM

## 2024-10-31 PROCEDURE — 49406 IMAGE CATH FLUID PERI/RETRO: CPT

## 2024-10-31 PROCEDURE — 7100000009 HC PHASE TWO TIME - INITIAL BASE CHARGE

## 2024-10-31 PROCEDURE — 7100000010 HC PHASE TWO TIME - EACH INCREMENTAL 1 MINUTE

## 2024-10-31 PROCEDURE — 76942 ECHO GUIDE FOR BIOPSY: CPT

## 2024-10-31 PROCEDURE — 2720000007 HC OR 272 NO HCPCS

## 2024-10-31 PROCEDURE — C1729 CATH, DRAINAGE: HCPCS

## 2024-10-31 PROCEDURE — 99152 MOD SED SAME PHYS/QHP 5/>YRS: CPT

## 2024-10-31 PROCEDURE — 99152 MOD SED SAME PHYS/QHP 5/>YRS: CPT | Performed by: STUDENT IN AN ORGANIZED HEALTH CARE EDUCATION/TRAINING PROGRAM

## 2024-10-31 PROCEDURE — 82570 ASSAY OF URINE CREATININE: CPT | Mod: AHULAB | Performed by: STUDENT IN AN ORGANIZED HEALTH CARE EDUCATION/TRAINING PROGRAM

## 2024-10-31 RX ORDER — LIDOCAINE HYDROCHLORIDE 10 MG/ML
INJECTION, SOLUTION EPIDURAL; INFILTRATION; INTRACAUDAL; PERINEURAL
Status: COMPLETED | OUTPATIENT
Start: 2024-10-31 | End: 2024-10-31

## 2024-10-31 RX ORDER — FENTANYL CITRATE 50 UG/ML
INJECTION, SOLUTION INTRAMUSCULAR; INTRAVENOUS
Status: COMPLETED | OUTPATIENT
Start: 2024-10-31 | End: 2024-10-31

## 2024-10-31 RX ORDER — MIDAZOLAM HYDROCHLORIDE 1 MG/ML
INJECTION INTRAMUSCULAR; INTRAVENOUS
Status: COMPLETED | OUTPATIENT
Start: 2024-10-31 | End: 2024-10-31

## 2024-10-31 ASSESSMENT — PAIN - FUNCTIONAL ASSESSMENT
PAIN_FUNCTIONAL_ASSESSMENT: 0-10

## 2024-10-31 ASSESSMENT — PAIN SCALES - GENERAL
PAINLEVEL_OUTOF10: 0 - NO PAIN
PAINLEVEL_OUTOF10: 3
PAINLEVEL_OUTOF10: 0 - NO PAIN

## 2024-11-03 LAB
BACTERIA FLD CULT: NORMAL
GRAM STN SPEC: NORMAL
GRAM STN SPEC: NORMAL

## 2024-11-06 LAB
LAB AP ASR DISCLAIMER: NORMAL
LAB AP BLOCK FOR ADDITIONAL STUDIES: NORMAL
LABORATORY COMMENT REPORT: NORMAL
PATH REPORT.FINAL DX SPEC: NORMAL
PATH REPORT.GROSS SPEC: NORMAL
PATH REPORT.RELEVANT HX SPEC: NORMAL
PATH REPORT.TOTAL CANCER: NORMAL
PATHOLOGY SYNOPTIC REPORT: NORMAL

## 2024-11-10 ENCOUNTER — APPOINTMENT (OUTPATIENT)
Dept: RADIOLOGY | Facility: HOSPITAL | Age: 44
End: 2024-11-10
Payer: COMMERCIAL

## 2024-11-10 ENCOUNTER — HOSPITAL ENCOUNTER (EMERGENCY)
Facility: HOSPITAL | Age: 44
Discharge: HOME | End: 2024-11-10
Attending: EMERGENCY MEDICINE
Payer: COMMERCIAL

## 2024-11-10 VITALS
WEIGHT: 235 LBS | TEMPERATURE: 98.1 F | DIASTOLIC BLOOD PRESSURE: 80 MMHG | SYSTOLIC BLOOD PRESSURE: 134 MMHG | BODY MASS INDEX: 35.61 KG/M2 | HEIGHT: 68 IN | OXYGEN SATURATION: 100 % | HEART RATE: 82 BPM | RESPIRATION RATE: 16 BRPM

## 2024-11-10 DIAGNOSIS — R10.32 LEFT LOWER QUADRANT ABDOMINAL PAIN: Primary | ICD-10-CM

## 2024-11-10 LAB
ALBUMIN SERPL BCP-MCNC: 4.5 G/DL (ref 3.4–5)
ALP SERPL-CCNC: 62 U/L (ref 33–120)
ALT SERPL W P-5'-P-CCNC: 32 U/L (ref 10–52)
ANION GAP SERPL CALC-SCNC: 9 MMOL/L (ref 10–20)
AST SERPL W P-5'-P-CCNC: 23 U/L (ref 9–39)
BASOPHILS # BLD AUTO: 0.05 X10*3/UL (ref 0–0.1)
BASOPHILS NFR BLD AUTO: 0.7 %
BILIRUB SERPL-MCNC: 0.4 MG/DL (ref 0–1.2)
BUN SERPL-MCNC: 23 MG/DL (ref 6–23)
CALCIUM SERPL-MCNC: 9.5 MG/DL (ref 8.6–10.3)
CHLORIDE SERPL-SCNC: 101 MMOL/L (ref 98–107)
CO2 SERPL-SCNC: 30 MMOL/L (ref 21–32)
CREAT SERPL-MCNC: 1.08 MG/DL (ref 0.5–1.3)
EGFRCR SERPLBLD CKD-EPI 2021: 86 ML/MIN/1.73M*2
EOSINOPHIL # BLD AUTO: 0.25 X10*3/UL (ref 0–0.7)
EOSINOPHIL NFR BLD AUTO: 3.4 %
ERYTHROCYTE [DISTWIDTH] IN BLOOD BY AUTOMATED COUNT: 12.8 % (ref 11.5–14.5)
GLUCOSE SERPL-MCNC: 83 MG/DL (ref 74–99)
HCT VFR BLD AUTO: 39.6 % (ref 41–52)
HGB BLD-MCNC: 13.5 G/DL (ref 13.5–17.5)
IMM GRANULOCYTES # BLD AUTO: 0.03 X10*3/UL (ref 0–0.7)
IMM GRANULOCYTES NFR BLD AUTO: 0.4 % (ref 0–0.9)
INR PPP: 1 (ref 0.9–1.1)
LACTATE SERPL-SCNC: 1.3 MMOL/L (ref 0.4–2)
LYMPHOCYTES # BLD AUTO: 2.21 X10*3/UL (ref 1.2–4.8)
LYMPHOCYTES NFR BLD AUTO: 29.7 %
MCH RBC QN AUTO: 28.7 PG (ref 26–34)
MCHC RBC AUTO-ENTMCNC: 34.1 G/DL (ref 32–36)
MCV RBC AUTO: 84 FL (ref 80–100)
MONOCYTES # BLD AUTO: 0.75 X10*3/UL (ref 0.1–1)
MONOCYTES NFR BLD AUTO: 10.1 %
NEUTROPHILS # BLD AUTO: 4.14 X10*3/UL (ref 1.2–7.7)
NEUTROPHILS NFR BLD AUTO: 55.7 %
NRBC BLD-RTO: 0 /100 WBCS (ref 0–0)
PLATELET # BLD AUTO: 408 X10*3/UL (ref 150–450)
POTASSIUM SERPL-SCNC: 3.1 MMOL/L (ref 3.5–5.3)
PROT SERPL-MCNC: 7.3 G/DL (ref 6.4–8.2)
PROTHROMBIN TIME: 11 SECONDS (ref 9.8–12.8)
RBC # BLD AUTO: 4.7 X10*6/UL (ref 4.5–5.9)
SODIUM SERPL-SCNC: 137 MMOL/L (ref 136–145)
WBC # BLD AUTO: 7.4 X10*3/UL (ref 4.4–11.3)

## 2024-11-10 PROCEDURE — 85610 PROTHROMBIN TIME: CPT | Performed by: EMERGENCY MEDICINE

## 2024-11-10 PROCEDURE — 96375 TX/PRO/DX INJ NEW DRUG ADDON: CPT

## 2024-11-10 PROCEDURE — 85025 COMPLETE CBC W/AUTO DIFF WBC: CPT | Performed by: EMERGENCY MEDICINE

## 2024-11-10 PROCEDURE — 2550000001 HC RX 255 CONTRASTS: Performed by: EMERGENCY MEDICINE

## 2024-11-10 PROCEDURE — 2500000004 HC RX 250 GENERAL PHARMACY W/ HCPCS (ALT 636 FOR OP/ED): Performed by: EMERGENCY MEDICINE

## 2024-11-10 PROCEDURE — 2500000001 HC RX 250 WO HCPCS SELF ADMINISTERED DRUGS (ALT 637 FOR MEDICARE OP): Performed by: EMERGENCY MEDICINE

## 2024-11-10 PROCEDURE — 96374 THER/PROPH/DIAG INJ IV PUSH: CPT | Mod: 59

## 2024-11-10 PROCEDURE — 74177 CT ABD & PELVIS W/CONTRAST: CPT

## 2024-11-10 PROCEDURE — 83605 ASSAY OF LACTIC ACID: CPT | Performed by: EMERGENCY MEDICINE

## 2024-11-10 PROCEDURE — 74177 CT ABD & PELVIS W/CONTRAST: CPT | Mod: FOREIGN READ | Performed by: RADIOLOGY

## 2024-11-10 PROCEDURE — 99284 EMERGENCY DEPT VISIT MOD MDM: CPT | Mod: 25

## 2024-11-10 PROCEDURE — 80053 COMPREHEN METABOLIC PANEL: CPT | Performed by: EMERGENCY MEDICINE

## 2024-11-10 PROCEDURE — 36415 COLL VENOUS BLD VENIPUNCTURE: CPT | Performed by: EMERGENCY MEDICINE

## 2024-11-10 RX ORDER — OXYCODONE AND ACETAMINOPHEN 5; 325 MG/1; MG/1
1 TABLET ORAL ONCE
Status: COMPLETED | OUTPATIENT
Start: 2024-11-10 | End: 2024-11-10

## 2024-11-10 RX ORDER — ONDANSETRON HYDROCHLORIDE 2 MG/ML
4 INJECTION, SOLUTION INTRAVENOUS ONCE
Status: COMPLETED | OUTPATIENT
Start: 2024-11-10 | End: 2024-11-10

## 2024-11-10 RX ORDER — OXYCODONE AND ACETAMINOPHEN 5; 325 MG/1; MG/1
1 TABLET ORAL EVERY 6 HOURS PRN
Qty: 12 TABLET | Refills: 0 | Status: SHIPPED | OUTPATIENT
Start: 2024-11-10 | End: 2024-11-13

## 2024-11-10 RX ORDER — MORPHINE SULFATE 4 MG/ML
4 INJECTION INTRAVENOUS ONCE
Status: COMPLETED | OUTPATIENT
Start: 2024-11-10 | End: 2024-11-10

## 2024-11-10 ASSESSMENT — PAIN DESCRIPTION - LOCATION: LOCATION: ABDOMEN

## 2024-11-10 ASSESSMENT — LIFESTYLE VARIABLES
EVER FELT BAD OR GUILTY ABOUT YOUR DRINKING: NO
EVER HAD A DRINK FIRST THING IN THE MORNING TO STEADY YOUR NERVES TO GET RID OF A HANGOVER: NO
HAVE PEOPLE ANNOYED YOU BY CRITICIZING YOUR DRINKING: NO
HAVE YOU EVER FELT YOU SHOULD CUT DOWN ON YOUR DRINKING: NO
TOTAL SCORE: 0

## 2024-11-10 ASSESSMENT — PAIN SCALES - GENERAL
PAINLEVEL_OUTOF10: 4
PAINLEVEL_OUTOF10: 8

## 2024-11-10 ASSESSMENT — PAIN DESCRIPTION - DESCRIPTORS: DESCRIPTORS: ACHING

## 2024-11-10 ASSESSMENT — PAIN - FUNCTIONAL ASSESSMENT
PAIN_FUNCTIONAL_ASSESSMENT: 0-10

## 2024-11-10 ASSESSMENT — PAIN DESCRIPTION - PAIN TYPE
TYPE: ACUTE PAIN
TYPE: ACUTE PAIN

## 2024-11-10 ASSESSMENT — PAIN DESCRIPTION - PROGRESSION: CLINICAL_PROGRESSION: GRADUALLY IMPROVING

## 2024-11-10 NOTE — ED PROVIDER NOTES
Department of Emergency Medicine   ED  Provider Note  Admit Date/RoomTime: 11/10/2024 10:40 AM  ED Room: 09/[facility]                  History of Present Illness:   Skyler Sehlton is a 44 y.o. male presenting to the ED for CRISTIAN drain left abdomen leaking around insertion site, beginning last 2 days, much worse overnight.  The complaint has been persistent, moderate in severity, and worsened by nothing.  Patient had left abdominal drain placed percutaneously with US Guidance on Monday (6 days ago).  Friday (2 days ago) started draining a few blood clots and has some increased pain.  No fever or chills.  No nausea vomiting or diarrhea.  No history of trauma or injury.  Patient is status post open left nephrectomy done on 10/23/2024 by Dr. Shannon.  Patient states leaking from insertion site this AM and less output into drainage bag.      Review of Systems:   Pertinent positives and review of systems as noted above.  Remaining 10 review of systems is negative or noncontributory to today's episode of care.  Review of Systems   A complete review of systems is otherwise negative except as noted above    --------------------------------------------- PAST HISTORY ---------------------------------------------  Past Medical History:  has a past medical history of Chronic headaches, Depression, Hypertension, Obesity, and Sleep apnea.    Past Surgical History:  has a past surgical history that includes Anal fistulotomy and Colonoscopy.    Social History:  reports that he has never smoked. He has never used smokeless tobacco. He reports current alcohol use. He reports that he does not use drugs.    Family History: family history includes Breast cancer in his sister; Heart attack in his father; Hypertension in his father and mother. Unless otherwise noted, family history is non contributory    Patient's Medications   New Prescriptions    No medications on file   Previous Medications    ACETAMINOPHEN (TYLENOL) 325 MG TABLET    Take 3  tablets (975 mg) by mouth every 6 hours.    DULOXETINE (CYMBALTA) 30 MG DR CAPSULE    Take 1 capsule (30 mg) by mouth once daily. Take with 60 mg capsule for 90 mg dose. Do not crush or chew.    DULOXETINE (CYMBALTA) 60 MG DR CAPSULE    Take 1 capsule (60 mg) by mouth early in the morning..    ERGOCALCIFEROL, VITAMIN D2, ORAL    Take 5,000 Units by mouth once daily.    HYDROCHLOROTHIAZIDE (HYDRODIURIL) 25 MG TABLET    Take 1 tablet (25 mg) by mouth once daily.    LOSARTAN (COZAAR) 100 MG TABLET    Take 1 tablet (100 mg) by mouth once daily.    METHOCARBAMOL (ROBAXIN) 500 MG TABLET    Take 1 tablet (500 mg) by mouth every 6 hours for 5 days.    OXYCODONE (ROXICODONE) 5 MG IMMEDIATE RELEASE TABLET    Take 1 tablet (5 mg) by mouth every 6 hours if needed for severe pain (7 - 10) for up to 6 doses.    SILDENAFIL (VIAGRA) 50 MG TABLET    Take 1 tablet (50 mg) by mouth if needed for erectile dysfunction.    TOPIRAMATE (TOPAMAX) 25 MG TABLET    Take 1 tablet (25 mg) by mouth once daily.   Modified Medications    No medications on file   Discontinued Medications    No medications on file      The patient’s home medications have been reviewed.    Allergies: Penicillins    -------------------------------------------------- RESULTS -------------------------------------------------  All laboratory and radiology results have been personally reviewed by myself   LABS:  Labs Reviewed   CBC WITH AUTO DIFFERENTIAL - Abnormal       Result Value    WBC 7.4      nRBC 0.0      RBC 4.70      Hemoglobin 13.5      Hematocrit 39.6 (*)     MCV 84      MCH 28.7      MCHC 34.1      RDW 12.8      Platelets 408      Neutrophils % 55.7      Immature Granulocytes %, Automated 0.4      Lymphocytes % 29.7      Monocytes % 10.1      Eosinophils % 3.4      Basophils % 0.7      Neutrophils Absolute 4.14      Immature Granulocytes Absolute, Automated 0.03      Lymphocytes Absolute 2.21      Monocytes Absolute 0.75      Eosinophils Absolute 0.25       "Basophils Absolute 0.05     COMPREHENSIVE METABOLIC PANEL - Abnormal    Glucose 83      Sodium 137      Potassium 3.1 (*)     Chloride 101      Bicarbonate 30      Anion Gap 9 (*)     Urea Nitrogen 23      Creatinine 1.08      eGFR 86      Calcium 9.5      Albumin 4.5      Alkaline Phosphatase 62      Total Protein 7.3      AST 23      Bilirubin, Total 0.4      ALT 32     LACTATE - Normal    Lactate 1.3      Narrative:     Venipuncture immediately after or during the administration of Metamizole may lead to falsely low results. Testing should be performed immediately prior to Metamizole dosing.   PROTIME-INR - Normal    Protime 11.0      INR 1.0           RADIOLOGY:  Interpreted by Radiologist.  CT abdomen pelvis w IV contrast   Final Result   Interval decrease in the size of the left flank subcutaneous   collection. Stable gas and fluid collection involving the upper pole   of the left kidney. Mild hepatic steatosis.   Signed by Chan Correa MD          Encounter Date: 10/15/24   ECG 12 lead   Result Value    Ventricular Rate 96    Atrial Rate 96    RI Interval 142    QRS Duration 86    QT Interval 354    QTC Calculation(Bazett) 447    P Axis 20    R Axis 14    T Axis 44    QRS Count 16    Q Onset 219    P Onset 148    P Offset 203    T Offset 396    QTC Fredericia 414    Narrative    Normal sinus rhythm  Normal ECG  No previous ECGs available  Confirmed by Yovany Taylor ([id number]) on 10/21/2024 11:00:27 PM     ------------------------- NURSING NOTES AND VITALS REVIEWED ---------------------------   The nursing notes within the ED encounter and vital signs as below have been reviewed.   /80 (Patient Position: Sitting)   Pulse 94   Temp 36.7 °C (98.1 °F) (Temporal)   Resp 18   Ht 1.727 m (5' 8\")   Wt 107 kg (235 lb)   SpO2 99%   BMI 35.73 kg/m²   Oxygen Saturation Interpretation: Normal      ---------------------------------------------------PHYSICAL EXAM--------------------------------------  Physical " Exam  Vitals and nursing note reviewed.   Constitutional:       General: He is not in acute distress.     Appearance: He is well-developed. He is not ill-appearing or diaphoretic.   HENT:      Head: Normocephalic and atraumatic.      Nose: Nose normal.      Mouth/Throat:      Mouth: Mucous membranes are moist.      Pharynx: Oropharynx is clear. No oropharyngeal exudate or posterior oropharyngeal erythema.   Eyes:      General: No scleral icterus.     Extraocular Movements: Extraocular movements intact.      Conjunctiva/sclera: Conjunctivae normal.      Pupils: Pupils are equal, round, and reactive to light.   Cardiovascular:      Rate and Rhythm: Normal rate and regular rhythm.      Pulses: Normal pulses.      Heart sounds: Normal heart sounds. No murmur heard.  Pulmonary:      Effort: Pulmonary effort is normal. No respiratory distress.      Breath sounds: Normal breath sounds. No wheezing or rhonchi.   Abdominal:      General: Bowel sounds are normal. There is no distension.      Palpations: Abdomen is soft. There is no mass.      Tenderness: There is abdominal tenderness. There is no guarding or rebound.      Hernia: No hernia is present.      Comments: Skin site and insertion of peritoneal drain appears essentially unremarkable.  It is leaking some serosanguineous fluid around the drain site there.  The incision over the left flank and abdomen is intact.  Minimal erythema.   Musculoskeletal:         General: No swelling.      Cervical back: Normal range of motion and neck supple. No rigidity or tenderness.   Lymphadenopathy:      Cervical: No cervical adenopathy.   Skin:     General: Skin is warm and dry.      Capillary Refill: Capillary refill takes less than 2 seconds.   Neurological:      Mental Status: He is alert and oriented to person, place, and time.   Psychiatric:         Mood and Affect: Mood normal.            Procedures  None  ------------------------------ ED COURSE/MEDICAL DECISION  MAKING----------------------    Medical Decision Making:   Patient was seen and examined by me.  An IV is started.  Appropriate labs are ordered.  We will obtain a CT of the abdomen pelvis with contrast.  Patient declines pain medications at this time.    KIDNEYS AND URETERS:  Postoperative changes of left kidney again noted.  A small gas and  fluid collection involving the surgical field portion of the upper  pole left kidney not significantly changed in appearance.  The  separate gas and fluid collection within the adjacent left flank has  decreased significantly in size from prior exam.  A residual pigtail  catheter noted along the anterior portion of the collection.  The  residual subcutaneous collection measures 6.6 x 3.2 cm.  Right kidney  remains normal in appearance.  No renal or ureteral calculi.     PELVIS:     BLADDER:  No abnormalities identified.     REPRODUCTIVE ORGANS:  No abnormalities identified.     BOWEL:  No abnormalities identified.     VESSELS:  No abnormalities identified.  Abdominal aorta is normal in caliber.      PERITONEUM/RETROPERITONEUM/LYMPH NODES:  No free fluid.  No pneumoperitoneum.  No lymphadenopathy.     ABDOMINAL WALL:  There is mild eventration of the left lateral abdominal wall related  to the surgical site.  SOFT TISSUES:   As above.     BONES:  No acute fracture or aggressive osseous lesion.  IMPRESSION:  Interval decrease in the size of the left flank subcutaneous  collection. Stable gas and fluid collection involving the upper pole  of the left kidney. Mild hepatic steatosis.  Signed by Chan Correa MD      ED Course as of 11/10/24 1753   Sun Nov 10, 2024   1336 Count 7.4, equal 13.5, medic at 39.6, platelet count 408,000 [EC]   1337 Comprehensive metabolic panel(!)  Comprehensive metabolic panel is normal [EC]   1337 Lactate is normal 1.3 [EC]   1337 MR 1.0 [EC]   1751 I had a Secure Chat with Dr RIDGE Shannon about the patient. [EC]   1751 Dr. Shannon:  Can put a stoma bag  around the drain so the drainage around the drain isn't as messy. I think I'm seeing him back soon. We can schedule him for a stent placement to help the drainage decrease faster. As long as there is drainage still  Coming out either through the drain or around it we need to sit tight. If subcutaneous collection is causing pain IR can drain that    [EC]   1751 I shared the findings of todays labs and xray studies with the patient and his wife.    Follow up with Dr Shannon this week.  Nursing will place stoma bag around the percutaneous site.  We will send home a couple of extra bags.  DC home  Continue current Care.  I did give a 3 day refill on his pain medications as he is out. [EC]      ED Course User Index  [EC] Geronimo Ponce DO         Diagnoses as of 11/10/24 1753   Left lower quadrant abdominal pain      Counseling:   The emergency provider has spoken with the patient and discussed today’s results, in addition to providing specific details for the plan of care and counseling regarding the diagnosis and prognosis.  Questions are answered at this time and they are agreeable with the plan.      --------------------------------- IMPRESSION AND DISPOSITION ---------------------------------        IMPRESSION  1. Left lower quadrant abdominal pain        DISPOSITION  Disposition: Discharge to home  Patient condition is stable      Billing Provider Critical Care Time: 0 minutes     Geronimo Ponce DO  11/10/24 1753

## 2024-11-10 NOTE — DISCHARGE INSTRUCTIONS
Labs are ok.  Imaging shows overall improvement in fluid collection  USE Stoma bag as needed around the drainage tube site.  Follow up with Dr Shannon THIS WEEEK  (next 1-4 days)  RETURN if acutely worse or new worrisome symptoms.  CALL Dr. Shannon office tomorrow morning.

## 2024-11-11 NOTE — DOCUMENTATION CLARIFICATION NOTE
"    PATIENT:               BYRON KELLY  ACCT #:                  [id number]  MRN:                       [id number]  :                       1980  ADMIT DATE:       10/21/2024 9:31 AM  DISCH DATE:        10/23/2024 3:04 PM  RESPONDING PROVIDER #:        [id number]          PROVIDER RESPONSE TEXT:    I concur with the pathology report findings and they are clinically significant    CDI QUERY TEXT:    Clarification        Instruction:    Based on your assessment of the patient and the clinical information, please provide the requested documentation by clicking on the appropriate radio button and enter any additional information if prompted.    Question: Please document whether you concur or do not concur with the pathology report findings    When answering this query, please exercise your independent professional judgment. The fact that a question is being asked, does not imply that any particular answer is desired or expected.    The patient's clinical indicators include:  Clinical Information:  10/22/2024DPN:\"44M w/ PMHx notable for left renal mass now s/p left open partial nephrectomy on 10/21/24 \"    Clinical Indicators and Pathology Findings:  2024 :\" Specimen designated as \"posterior tumor\":  -- Renal cell carcinoma, clear-cell type, with cystic and degenerative changes \"    Treatment: L open partial nephrectomy    Risk Factors: L renal mass  Options provided:  -- I concur with the pathology report findings and they are clinically significant  -- I do not concur with the pathology report findings  -- Other - I will add my own diagnosis  -- Refer to Clinical Documentation Reviewer    Query created by: Mireya Trejo on 2024 11:06 AM      Electronically signed by:  JOSHUA BLACKMON MD 2024 2:32 PM          "

## 2024-11-14 ENCOUNTER — OFFICE VISIT (OUTPATIENT)
Dept: UROLOGY | Facility: HOSPITAL | Age: 44
End: 2024-11-14
Payer: COMMERCIAL

## 2024-11-14 DIAGNOSIS — L76.82 POSTOPERATIVE COMPLICATION OF SKIN INVOLVING DRAINAGE FROM SURGICAL WOUND: ICD-10-CM

## 2024-11-14 PROCEDURE — 99211 OFF/OP EST MAY X REQ PHY/QHP: CPT | Performed by: UROLOGY

## 2024-11-14 RX ORDER — CIPROFLOXACIN 2 MG/ML
400 INJECTION, SOLUTION INTRAVENOUS ONCE
OUTPATIENT
Start: 2024-11-18 | End: 2024-11-14

## 2024-11-14 RX ORDER — CHLORHEXIDINE GLUCONATE 40 MG/ML
SOLUTION TOPICAL DAILY PRN
OUTPATIENT
Start: 2024-11-14

## 2024-11-14 RX ORDER — VANCOMYCIN/0.9 % SOD CHLORIDE 1.5G/250ML
1500 PLASTIC BAG, INJECTION (ML) INTRAVENOUS ONCE
OUTPATIENT
Start: 2024-11-18 | End: 2024-11-14

## 2024-11-14 NOTE — H&P (VIEW-ONLY)
"POV     HISTORY OF PRESENT ILLNESS:   Skyler Shelton is a 44 y.o. male who is being seen today for POV s/p left open partial nephrectomy 10/21/24 for renal mass   - abdominal US on 9/11/24 demonstrating a left 2.7 cm cystic lesion with possible peripheral solid component.   PAST MEDICAL HISTORY:  Past Medical History:   Diagnosis Date    Chronic headaches     Depression     Hypertension     Obesity     Sleep apnea        PAST SURGICAL HISTORY:  Past Surgical History:   Procedure Laterality Date    ANAL FISTULOTOMY      COLONOSCOPY          ALLERGIES:   Allergies   Allergen Reactions    Penicillins Rash and Unknown        MEDICATIONS:   Current Outpatient Medications   Medication Instructions    acetaminophen (TYLENOL) 975 mg, oral, Every 6 hours    DULoxetine (Cymbalta) 60 mg DR capsule 1 capsule, Daily (0630)    DULoxetine (CYMBALTA) 30 mg, Daily    ERGOCALCIFEROL, VITAMIN D2, ORAL 5,000 Units, Daily    hydroCHLOROthiazide (HYDRODIURIL) 25 mg, Daily    losartan (COZAAR) 100 mg, Daily    methocarbamol (ROBAXIN) 500 mg, oral, Every 6 hours scheduled    oxyCODONE (ROXICODONE) 5 mg, oral, Every 6 hours PRN    sildenafil (Viagra) 50 mg tablet 1 tablet, As needed    topiramate (TOPAMAX) 25 mg, Daily        PHYSICAL EXAM:  There were no vitals taken for this visit.  Constitutional: Patient appears well-developed and well-nourished. No distress.    Pulmonary/Chest: Effort normal. No respiratory distress.   Abdominal: Soft, ND NT  : WNL  Musculoskeletal: Normal range of motion.    Neurological: Alert and oriented to person, place, and time.  Psychiatric: Normal mood and affect. Behavior is normal. Thought content normal.      Labs:  No results found for: \"TESTOSTERONE\"  No results found for: \"PSA\"  No components found for: \"CBC\"  Lab Results   Component Value Date    CREATININE 1.08 11/10/2024     No components found for: \"TESTOTMS\"  No results found for: \"TESTF\"    Imaging: Renal CT on 9/19/24 demonstrated 3.3 cm Bosniak " 4 cystic and solid mass arising from the posterior medial  interpolar right renal cortex concerning for solid renal neoplasm.  No evidence of adrenal or renal vein involvement.    2.8 cm Bosniak 2F cystic lesion arising from the superior pole of the left kidney.  No lymphadenopathy within the abdomen. (Upon reviewing imaging from [facility] with patient , right kidney was found to be totally normal and both Bosniak 4 mass and Bosniak 2F cyst were on the left kidney. We will call the radiologist and discuss this)     Surgical pathology 10/21/24: Renal cell carcinoma, clear-cell type, with cystic and degenerative changes   Results reviewed with patient. Patient reassured.     Discussion: Patient is s/p left open partial nephrectomy 10/21/24 for renal mass. Presented to the ED 11/10/24 for severe leakage around the CRISTIAN drain around the incision site. Patient had left abdominal drain placed percutaneously with US Guidance and on  Friday started draining a few blood clots and has some increased pain.  No fever or chills.  No nausea vomiting or diarrhea.  No history of trauma or injury. They put a stoma bag around the drain. We discussed a stent placement to help the drainage decrease faster. Warning signs reviewed. Will come back for double J stent placement. Having discomfort from drain,, offered IR, but pt declines.   Assessment:      1. Postoperative complication of skin involving drainage from surgical wound             NPV for left renal mass  Plan:   Schedule for Cysto left retrograde stent placement   All questions and concerns were addressed. Patient verbalizes understanding and has no other questions at this time.     Scribe Attestation  By signing my name below, ILudy Scribe   attest that this documentation has been prepared under the direction and in the presence of Baldo Shannon MD.

## 2024-11-14 NOTE — PROGRESS NOTES
"POV     HISTORY OF PRESENT ILLNESS:   Skyler Shelton is a 44 y.o. male who is being seen today for POV s/p left open partial nephrectomy 10/21/24 for renal mass   - abdominal US on 9/11/24 demonstrating a left 2.7 cm cystic lesion with possible peripheral solid component.   PAST MEDICAL HISTORY:  Past Medical History:   Diagnosis Date    Chronic headaches     Depression     Hypertension     Obesity     Sleep apnea        PAST SURGICAL HISTORY:  Past Surgical History:   Procedure Laterality Date    ANAL FISTULOTOMY      COLONOSCOPY          ALLERGIES:   Allergies   Allergen Reactions    Penicillins Rash and Unknown        MEDICATIONS:   Current Outpatient Medications   Medication Instructions    acetaminophen (TYLENOL) 975 mg, oral, Every 6 hours    DULoxetine (Cymbalta) 60 mg DR capsule 1 capsule, Daily (0630)    DULoxetine (CYMBALTA) 30 mg, Daily    ERGOCALCIFEROL, VITAMIN D2, ORAL 5,000 Units, Daily    hydroCHLOROthiazide (HYDRODIURIL) 25 mg, Daily    losartan (COZAAR) 100 mg, Daily    methocarbamol (ROBAXIN) 500 mg, oral, Every 6 hours scheduled    oxyCODONE (ROXICODONE) 5 mg, oral, Every 6 hours PRN    sildenafil (Viagra) 50 mg tablet 1 tablet, As needed    topiramate (TOPAMAX) 25 mg, Daily        PHYSICAL EXAM:  There were no vitals taken for this visit.  Constitutional: Patient appears well-developed and well-nourished. No distress.    Pulmonary/Chest: Effort normal. No respiratory distress.   Abdominal: Soft, ND NT  : WNL  Musculoskeletal: Normal range of motion.    Neurological: Alert and oriented to person, place, and time.  Psychiatric: Normal mood and affect. Behavior is normal. Thought content normal.      Labs:  No results found for: \"TESTOSTERONE\"  No results found for: \"PSA\"  No components found for: \"CBC\"  Lab Results   Component Value Date    CREATININE 1.08 11/10/2024     No components found for: \"TESTOTMS\"  No results found for: \"TESTF\"    Imaging: Renal CT on 9/19/24 demonstrated 3.3 cm Bosniak " 4 cystic and solid mass arising from the posterior medial  interpolar right renal cortex concerning for solid renal neoplasm.  No evidence of adrenal or renal vein involvement.    2.8 cm Bosniak 2F cystic lesion arising from the superior pole of the left kidney.  No lymphadenopathy within the abdomen. (Upon reviewing imaging from [facility] with patient , right kidney was found to be totally normal and both Bosniak 4 mass and Bosniak 2F cyst were on the left kidney. We will call the radiologist and discuss this)     Surgical pathology 10/21/24: Renal cell carcinoma, clear-cell type, with cystic and degenerative changes   Results reviewed with patient. Patient reassured.     Discussion: Patient is s/p left open partial nephrectomy 10/21/24 for renal mass. Presented to the ED 11/10/24 for severe leakage around the CRISTIAN drain around the incision site. Patient had left abdominal drain placed percutaneously with US Guidance and on  Friday started draining a few blood clots and has some increased pain.  No fever or chills.  No nausea vomiting or diarrhea.  No history of trauma or injury. They put a stoma bag around the drain. We discussed a stent placement to help the drainage decrease faster. Warning signs reviewed. Will come back for double J stent placement. Having discomfort from drain,, offered IR, but pt declines.   Assessment:      1. Postoperative complication of skin involving drainage from surgical wound             NPV for left renal mass  Plan:   Schedule for Cysto left retrograde stent placement   All questions and concerns were addressed. Patient verbalizes understanding and has no other questions at this time.     Scribe Attestation  By signing my name below, ILudy Scribe   attest that this documentation has been prepared under the direction and in the presence of Baldo Shannon MD.

## 2024-11-15 ENCOUNTER — ANESTHESIA EVENT (OUTPATIENT)
Dept: OPERATING ROOM | Facility: HOSPITAL | Age: 44
End: 2024-11-15
Payer: COMMERCIAL

## 2024-11-18 ENCOUNTER — ANESTHESIA (OUTPATIENT)
Dept: OPERATING ROOM | Facility: HOSPITAL | Age: 44
End: 2024-11-18
Payer: COMMERCIAL

## 2024-11-18 ENCOUNTER — APPOINTMENT (OUTPATIENT)
Dept: RADIOLOGY | Facility: HOSPITAL | Age: 44
End: 2024-11-18
Payer: COMMERCIAL

## 2024-11-18 ENCOUNTER — HOSPITAL ENCOUNTER (OUTPATIENT)
Facility: HOSPITAL | Age: 44
Setting detail: OUTPATIENT SURGERY
Discharge: HOME | End: 2024-11-18
Attending: UROLOGY | Admitting: UROLOGY
Payer: COMMERCIAL

## 2024-11-18 VITALS
TEMPERATURE: 97.3 F | SYSTOLIC BLOOD PRESSURE: 121 MMHG | DIASTOLIC BLOOD PRESSURE: 71 MMHG | RESPIRATION RATE: 14 BRPM | HEART RATE: 92 BPM | BODY MASS INDEX: 36.24 KG/M2 | OXYGEN SATURATION: 98 % | WEIGHT: 238.32 LBS

## 2024-11-18 DIAGNOSIS — N28.89 LEFT RENAL MASS: Primary | ICD-10-CM

## 2024-11-18 PROCEDURE — 2500000004 HC RX 250 GENERAL PHARMACY W/ HCPCS (ALT 636 FOR OP/ED)

## 2024-11-18 PROCEDURE — 96372 THER/PROPH/DIAG INJ SC/IM: CPT

## 2024-11-18 PROCEDURE — 2720000007 HC OR 272 NO HCPCS: Performed by: UROLOGY

## 2024-11-18 PROCEDURE — C2617 STENT, NON-COR, TEM W/O DEL: HCPCS | Performed by: UROLOGY

## 2024-11-18 PROCEDURE — 3700000002 HC GENERAL ANESTHESIA TIME - EACH INCREMENTAL 1 MINUTE: Performed by: UROLOGY

## 2024-11-18 PROCEDURE — 2550000001 HC RX 255 CONTRASTS: Performed by: UROLOGY

## 2024-11-18 PROCEDURE — 2780000003 HC OR 278 NO HCPCS: Performed by: UROLOGY

## 2024-11-18 PROCEDURE — 7100000010 HC PHASE TWO TIME - EACH INCREMENTAL 1 MINUTE: Performed by: UROLOGY

## 2024-11-18 PROCEDURE — 2500000001 HC RX 250 WO HCPCS SELF ADMINISTERED DRUGS (ALT 637 FOR MEDICARE OP)

## 2024-11-18 PROCEDURE — 2500000005 HC RX 250 GENERAL PHARMACY W/O HCPCS: Performed by: UROLOGY

## 2024-11-18 PROCEDURE — 7100000002 HC RECOVERY ROOM TIME - EACH INCREMENTAL 1 MINUTE: Performed by: UROLOGY

## 2024-11-18 PROCEDURE — 3600000008 HC OR TIME - EACH INCREMENTAL 1 MINUTE - PROCEDURE LEVEL THREE: Performed by: UROLOGY

## 2024-11-18 PROCEDURE — C1769 GUIDE WIRE: HCPCS | Performed by: UROLOGY

## 2024-11-18 PROCEDURE — 74420 UROGRAPHY RTRGR +-KUB: CPT | Performed by: UROLOGY

## 2024-11-18 PROCEDURE — 3600000003 HC OR TIME - INITIAL BASE CHARGE - PROCEDURE LEVEL THREE: Performed by: UROLOGY

## 2024-11-18 PROCEDURE — 2500000004 HC RX 250 GENERAL PHARMACY W/ HCPCS (ALT 636 FOR OP/ED): Performed by: UROLOGY

## 2024-11-18 PROCEDURE — 52332 CYSTOSCOPY AND TREATMENT: CPT | Performed by: UROLOGY

## 2024-11-18 PROCEDURE — A52332 PR CYSTOSCOPY,INSERT URETERAL STENT: Performed by: ANESTHESIOLOGY

## 2024-11-18 PROCEDURE — 7100000009 HC PHASE TWO TIME - INITIAL BASE CHARGE: Performed by: UROLOGY

## 2024-11-18 PROCEDURE — 7100000001 HC RECOVERY ROOM TIME - INITIAL BASE CHARGE: Performed by: UROLOGY

## 2024-11-18 PROCEDURE — 3700000001 HC GENERAL ANESTHESIA TIME - INITIAL BASE CHARGE: Performed by: UROLOGY

## 2024-11-18 PROCEDURE — 2500000005 HC RX 250 GENERAL PHARMACY W/O HCPCS

## 2024-11-18 DEVICE — STENT, INLAY OPTIMA, 6FR X 26CM: Type: IMPLANTABLE DEVICE | Site: URETER | Status: FUNCTIONAL

## 2024-11-18 RX ORDER — CEFAZOLIN 1 G/1
INJECTION, POWDER, FOR SOLUTION INTRAVENOUS AS NEEDED
Status: DISCONTINUED | OUTPATIENT
Start: 2024-11-18 | End: 2024-11-18

## 2024-11-18 RX ORDER — ONDANSETRON HYDROCHLORIDE 2 MG/ML
4 INJECTION, SOLUTION INTRAVENOUS ONCE AS NEEDED
Status: DISCONTINUED | OUTPATIENT
Start: 2024-11-18 | End: 2024-11-18 | Stop reason: HOSPADM

## 2024-11-18 RX ORDER — FENTANYL CITRATE 50 UG/ML
50 INJECTION, SOLUTION INTRAMUSCULAR; INTRAVENOUS EVERY 5 MIN PRN
Status: DISCONTINUED | OUTPATIENT
Start: 2024-11-18 | End: 2024-11-18 | Stop reason: HOSPADM

## 2024-11-18 RX ORDER — OXYBUTYNIN CHLORIDE 5 MG/1
5 TABLET ORAL 3 TIMES DAILY PRN
Qty: 90 TABLET | Refills: 0 | Status: ON HOLD | OUTPATIENT
Start: 2024-11-18 | End: 2024-12-18

## 2024-11-18 RX ORDER — DROPERIDOL 2.5 MG/ML
0.62 INJECTION, SOLUTION INTRAMUSCULAR; INTRAVENOUS ONCE AS NEEDED
Status: DISCONTINUED | OUTPATIENT
Start: 2024-11-18 | End: 2024-11-18 | Stop reason: HOSPADM

## 2024-11-18 RX ORDER — WATER 1 ML/ML
IRRIGANT IRRIGATION AS NEEDED
Status: DISCONTINUED | OUTPATIENT
Start: 2024-11-18 | End: 2024-11-18 | Stop reason: HOSPADM

## 2024-11-18 RX ORDER — PROPOFOL 10 MG/ML
INJECTION, EMULSION INTRAVENOUS CONTINUOUS PRN
Status: DISCONTINUED | OUTPATIENT
Start: 2024-11-18 | End: 2024-11-18

## 2024-11-18 RX ORDER — LIDOCAINE HYDROCHLORIDE 10 MG/ML
0.1 INJECTION, SOLUTION EPIDURAL; INFILTRATION; INTRACAUDAL; PERINEURAL ONCE
Status: DISCONTINUED | OUTPATIENT
Start: 2024-11-18 | End: 2024-11-18 | Stop reason: HOSPADM

## 2024-11-18 RX ORDER — VANCOMYCIN/0.9 % SOD CHLORIDE 1.5G/250ML
1500 PLASTIC BAG, INJECTION (ML) INTRAVENOUS ONCE
Status: DISCONTINUED | OUTPATIENT
Start: 2024-11-18 | End: 2024-11-18 | Stop reason: HOSPADM

## 2024-11-18 RX ORDER — PHENAZOPYRIDINE HYDROCHLORIDE 200 MG/1
200 TABLET, FILM COATED ORAL
Qty: 42 TABLET | Refills: 0 | Status: ON HOLD | OUTPATIENT
Start: 2024-11-18 | End: 2024-12-02

## 2024-11-18 RX ORDER — CHLORHEXIDINE GLUCONATE 40 MG/ML
SOLUTION TOPICAL DAILY PRN
Status: DISCONTINUED | OUTPATIENT
Start: 2024-11-18 | End: 2024-11-18 | Stop reason: HOSPADM

## 2024-11-18 RX ORDER — FENTANYL CITRATE 50 UG/ML
INJECTION, SOLUTION INTRAMUSCULAR; INTRAVENOUS AS NEEDED
Status: DISCONTINUED | OUTPATIENT
Start: 2024-11-18 | End: 2024-11-18

## 2024-11-18 RX ORDER — ACETAMINOPHEN 325 MG/1
650 TABLET ORAL EVERY 4 HOURS PRN
Status: DISCONTINUED | OUTPATIENT
Start: 2024-11-18 | End: 2024-11-18 | Stop reason: HOSPADM

## 2024-11-18 RX ORDER — OXYCODONE HYDROCHLORIDE 5 MG/1
5 TABLET ORAL EVERY 4 HOURS PRN
Status: DISCONTINUED | OUTPATIENT
Start: 2024-11-18 | End: 2024-11-18 | Stop reason: HOSPADM

## 2024-11-18 RX ORDER — LIDOCAINE HYDROCHLORIDE 20 MG/ML
INJECTION, SOLUTION INFILTRATION; PERINEURAL AS NEEDED
Status: DISCONTINUED | OUTPATIENT
Start: 2024-11-18 | End: 2024-11-18 | Stop reason: HOSPADM

## 2024-11-18 RX ORDER — CIPROFLOXACIN 2 MG/ML
400 INJECTION, SOLUTION INTRAVENOUS ONCE
Status: DISCONTINUED | OUTPATIENT
Start: 2024-11-18 | End: 2024-11-18 | Stop reason: HOSPADM

## 2024-11-18 RX ORDER — BACITRACIN ZINC 500 UNIT/G
OINTMENT (GRAM) TOPICAL 3 TIMES DAILY
Qty: 14 G | Refills: 0 | Status: ON HOLD | OUTPATIENT
Start: 2024-11-18

## 2024-11-18 RX ORDER — DROPERIDOL 2.5 MG/ML
INJECTION, SOLUTION INTRAMUSCULAR; INTRAVENOUS AS NEEDED
Status: DISCONTINUED | OUTPATIENT
Start: 2024-11-18 | End: 2024-11-18

## 2024-11-18 RX ORDER — MIDAZOLAM HYDROCHLORIDE 1 MG/ML
INJECTION INTRAMUSCULAR; INTRAVENOUS AS NEEDED
Status: DISCONTINUED | OUTPATIENT
Start: 2024-11-18 | End: 2024-11-18

## 2024-11-18 RX ORDER — LABETALOL HYDROCHLORIDE 5 MG/ML
5 INJECTION, SOLUTION INTRAVENOUS ONCE AS NEEDED
Status: DISCONTINUED | OUTPATIENT
Start: 2024-11-18 | End: 2024-11-18 | Stop reason: HOSPADM

## 2024-11-18 RX ORDER — FENTANYL CITRATE 50 UG/ML
25 INJECTION, SOLUTION INTRAMUSCULAR; INTRAVENOUS EVERY 5 MIN PRN
Status: DISCONTINUED | OUTPATIENT
Start: 2024-11-18 | End: 2024-11-18 | Stop reason: HOSPADM

## 2024-11-18 RX ORDER — ALBUTEROL SULFATE 0.83 MG/ML
2.5 SOLUTION RESPIRATORY (INHALATION) ONCE AS NEEDED
Status: DISCONTINUED | OUTPATIENT
Start: 2024-11-18 | End: 2024-11-18 | Stop reason: HOSPADM

## 2024-11-18 SDOH — HEALTH STABILITY: MENTAL HEALTH: CURRENT SMOKER: 0

## 2024-11-18 ASSESSMENT — PAIN - FUNCTIONAL ASSESSMENT
PAIN_FUNCTIONAL_ASSESSMENT: 0-10

## 2024-11-18 ASSESSMENT — PAIN SCALES - GENERAL
PAINLEVEL_OUTOF10: 5 - MODERATE PAIN
PAIN_LEVEL: 2
PAINLEVEL_OUTOF10: 5 - MODERATE PAIN

## 2024-11-18 ASSESSMENT — COLUMBIA-SUICIDE SEVERITY RATING SCALE - C-SSRS
2. HAVE YOU ACTUALLY HAD ANY THOUGHTS OF KILLING YOURSELF?: NO
6. HAVE YOU EVER DONE ANYTHING, STARTED TO DO ANYTHING, OR PREPARED TO DO ANYTHING TO END YOUR LIFE?: NO
1. IN THE PAST MONTH, HAVE YOU WISHED YOU WERE DEAD OR WISHED YOU COULD GO TO SLEEP AND NOT WAKE UP?: NO

## 2024-11-18 NOTE — ANESTHESIA POSTPROCEDURE EVALUATION
Patient: Skyler Shelton    Procedure Summary       Date: 11/18/24 Room / Location: [facility] OR 01 / [facility] [place] [facility] [place]    Anesthesia Start: 1018 Anesthesia Stop: 1147    Procedures:       Cystoscopy with Insertion Stent Ureter (Left: Pelvis)      Cystoscopy with Retrograde Pyelogram (Left: Pelvis) Diagnosis:       Postoperative complication of skin involving drainage from surgical wound      (Postoperative complication of skin involving drainage from surgical wound [L76.82])    Surgeons: Baldo Shannon MD Responsible Provider: Javier Lynn MD    Anesthesia Type: MAC ASA Status: 2            Anesthesia Type: MAC    Vitals Value Taken Time   /73 11/18/24 1130   Temp 36.8 °C (98.2 °F) 11/18/24 1115   Pulse 89 11/18/24 1130   Resp 11 11/18/24 1130   SpO2 95 % 11/18/24 1130       Anesthesia Post Evaluation    Patient location during evaluation: PACU  Patient participation: complete - patient participated  Level of consciousness: sleepy but conscious  Pain score: 2  Pain management: adequate  Multimodal analgesia pain management approach  Airway patency: patent  Two or more strategies used to mitigate risk of obstructive sleep apnea  Cardiovascular status: hemodynamically stable  Respiratory status: acceptable, T-piece, spontaneous ventilation and airway suctioned  Hydration status: euvolemic  Postoperative Nausea and Vomiting: none        No notable events documented.

## 2024-11-18 NOTE — DISCHARGE INSTRUCTIONS
[facility] [place]    DISCHARGE INSTRUCTIONS     Cystoscopy with Stent placement     Skyler Shelton      Call [phone] during regular daytime business hours (8:00 am - 5:00 pm) and after 5:00 pm  and ask for the Urology resident with any questions or concerns.      If it is a life-threatening situation, proceed to the nearest emergency department.        Follow-up appointment:  11/21    Thank you for the opportunity to care for you today.  Your health and healing are very important to us.  We hope we made you feel as comfortable as possible and are committed to your recovery and continued well-being.      The following is a brief overview of your cystoscopy stent insertion/exchange procedure today. Some of the information contained on this summary may be confidential.  This information should be kept in your records and should be shared with your regular doctor.    Physicians:   Dr. Shannon      Procedure performed: cystoscopy (looked inside your bladder) with insertion of a new stent/exchange of previous stent       What to Expect During your Recovery and Home Care  Anesthesia Side Effects   You received anesthesia today.  You may feel sleepy, tired, or have a sore throat.   You may also feel drowsiness, dizziness, or inability to think clearly.  For your safety, do not drive, drink alcoholic beverages, take any unprescribed medication or make any important decisions for 24 hours.  A responsible adult should be with you for 24 hours.        Activity and Recovery    No heavy lifting today. Rest for the next 24 hours.     Pain Control  Unfortunately, you may experience pain after your procedure.  Frequency and urgency to urinate and mild discomfort are expected. Adequate pain management can include alternative measures to help ease your pain and that can include over the counter Tylenol/ibuprofen and a heating pad. Do not take more than 4,000mg of Tylenol in a 24-hour period.      You may have also been prescribed  Pyridium (for burning sensation) and Ditropan(for bladder spasms) for pain control. Pyridium will turn your urine bright orange. Do not take oxybutinin (ditropan) 24hours prior to nicole removal    Nausea/Vomiting   Clear liquids are best tolerated at first. Start slow, advance your diet as tolerated to normal foods. Avoid spicy, greasy, heavy foods at first. Also, you may feel nauseous or like you need to vomit if you take any type of medication on an empty stomach.  Call your physician if you are unable to eat or drink and have persistent vomiting.    Signs of Bleeding   You could have some blood in your urine off and on over the next several weeks. Your urine will be light pink to yellow.    Treatment/wound care:   It is okay to shower 24 hours after time of surgery.      Signs of Infection  Signs of infection can include fever, drainage(green/yellow), chills, burning sensation with passing of urine, or severe abdominal pain.  If you see any of these occur, please contact your doctor's office at [phone].  Any fever higher than 100.4, especially if associated with an ill feeling, abdominal pain, chills, or nausea should be reported to your surgeon.      Assist in bowel movements/urination  Increase fiber in diet  Increase water (6 to 8 glasses)  Increase walking   Urination should occur within 6 hours of anesthesia  If you have tried these methods and your bladder still feels full and you cannot use the bathroom, please go to your nearest Emergency room/contact your physician.    Additional Instructions:     You had a stent placed today from your kidney down into your bladder. This helps keep the urinary tract open and prevents blockages of urine flow. The stent can be irritating and can cause some frequency, urgency and blood in your urine when you go to the bathroom. It is important to drink plenty of water and take medications as prescribed. You may be sent home with Pyridium which turns your urine bright  orange. Applying a heating pad to your back will also help with this kind of pain.    You can apply bacitracin ointment to urethral meatus (tip of penis) where catheter enters for catheter related discomfort

## 2024-11-18 NOTE — OP NOTE
Cystoscopy with Insertion Stent Ureter (L), Cystoscopy with Retrograde Pyelogram (L) Operative Note     Date: 2024  OR Location: [facility] OR    Name: Skyler Shelton, : 1980, Age: 44 y.o., MRN: [id number], Sex: male    Diagnosis  Pre-op Diagnosis      * Postoperative complication of skin involving drainage from surgical wound [L76.82] Post-op Diagnosis     * Postoperative complication of skin involving drainage from surgical wound [L76.82]     Procedures  Cystoscopy with Insertion Stent Ureter  95001 - TN CYSTO W/INSERT URETERAL STENT    Cystoscopy with Retrograde Pyelogram  85211 - TN CYSTO BLADDER W/URETERAL CATHETERIZATION    Cystoscopy with Retrograde Pyelogram  92075 - CHG UROGRAPHY RETROGRADE WITH/WO KUB      Surgeons      * Baldo Shannon - Primary    Resident/Fellow/Other Assistant:  Surgeons and Role:  * No surgeons found with a matching role *    Staff:   Circulator: Mayra Hamm Person: Melisa    Anesthesia Staff: Anesthesiologist: Javier Lynn MD  Anesthesia Resident: Kaley Obregon DO    Procedure Summary  Anesthesia: Monitor Anesthesia Care  ASA: II  Estimated Blood Loss: 1mL  Intra-op Medications:   Administrations occurring from 0848 to 1033 on 24:   Medication Name Total Dose   ceFAZolin (Ancef) 1 g 2 g   midazolam PF (Versed) injection 1 mg/mL 2 mg   propofol (Diprivan) injection 10 mg/mL 251.34 mg              Anesthesia Record               Intraprocedure I/O Totals       None           Specimen: No specimens collected              Drains and/or Catheters:   Closed/Suction Drain Left LUQ (Active)       Urethral Catheter Non-latex 16 Fr. (Active)       Implants:  Implants       Type Name Action Serial No.      Stent STENT, INLAY OPTIMA, 6FR X 26CM - SNOT NOTED - [id number] Implanted NOT NOTED              Findings: Extravasation of contrast from left upper pole calyx c/w partial nephrectomy bed.     Indications: Skyler Shelton is an 44 y.o. male who is having surgery for  Postoperative complication of skin involving drainage from surgical wound [L76.82].     The patient was seen in the preoperative area. The risks, benefits, complications, treatment options, non-operative alternatives, expected recovery and outcomes were discussed with the patient. The possibilities of reaction to medication, pulmonary aspiration, injury to surrounding structures, bleeding, recurrent infection, the need for additional procedures, failure to diagnose a condition, and creating a complication requiring transfusion or operation were discussed with the patient. The patient concurred with the proposed plan, giving informed consent.  The site of surgery was properly noted/marked if necessary per policy. The patient has been actively warmed in preoperative area. Preoperative antibiotics have been ordered and given within 1 hours of incision. Venous thrombosis prophylaxis have been ordered including bilateral sequential compression devices    Procedure Details:   Patient consented to procedure in preoperative area. Risks and benefits discussed. Patient was marked on the left side. Allergies were reviewed and preoperative antibiotics (Ancef) were administered. Patient was brought to the operating room and placed in supine position on the operating room table. A timeout was performed. All were in agreement. Patient underwent general anesthesia without complication. They were repositioned in dorsal lithotomy and prepped and draped in the usual sterile fashion. A 22 fr rigid cystoscope was used to perform cystourethroscopy. Urethra was normal.  High bladder neck, prostate nonobstructing.  UOs were in normal orthotopic position. No masses or stones were identified.  A 5Fr ureteral catheter was placed over a wire through the left UO and retrograde pyelogram was performed.     Retrograde pyelogram interpretation: Normal caliber and course of ureter without filling defects.  No hydroureteronephrosis.  Extravasation  of contrast from left upper pole consistent with site of partial nephrectomy bed.    The wire was replaced through the 5Fr catheter to the level of the kidney as confirmed on fluoroscopy. A 6x 26 JJ stent was placed over the wire. Proximal curl was noted in the renal pelvis of the kidney and ensuring curl and not angled toward defect on fluoroscopy and distal curl was seen in the bladder under direct visualization.  Manipulated distal end of stent with alligator grasper to ensure appropriate placement in bladder.  instruments removed.  16 Upper sorbian Kennedy catheter placed with ease, balloon inflated with 10 cc sterile water.      Patient placed back in supine position.  Kennedy secured with StatLock.  Abdominal incision from previous surgery assessed, showing some dermatitis along medial border of incision near drain site.  Drain was noted not to be sutured in place.  At this time local anesthetic of 2% lidocaine infiltrated in the dermis near drain.  2-0 silk suture used to secure drain in place.  Dressings replaced.    This concluded the procedure. Patient was awoken from anesthesia without complication and transferred to PACU in stable condition.   Complications:  None; patient tolerated the procedure well.    Disposition: PACU - hemodynamically stable.  Condition: stable       Additional Details: None    Attending Attestation:     Baldo Shannon  Phone Number: [phone]

## 2024-11-18 NOTE — ANESTHESIA PREPROCEDURE EVALUATION
Patient: Skyler Shelton    Procedure Information       Date/Time: 11/18/24 0848    Procedures:       Cystoscopy with Insertion Stent Ureter (Left: Pelvis)      Cystoscopy with Retrograde Pyelogram (Left: Pelvis)    Location: [facility] OR 01 / [facility] OR    Surgeons: Baldo Shannon MD            Relevant Problems   Cardiac   (+) HTN (hypertension)      Pulmonary   (+) YI (obstructive sleep apnea)       Clinical information reviewed:   Tobacco  Allergies  Meds   Med Hx  Surg Hx   Fam Hx  Soc Hx        NPO Detail:  NPO/Void Status  Date of Last Liquid: 11/18/24  Time of Last Liquid: 0000  Date of Last Solid: 11/18/24  Time of Last Solid: 0000         Physical Exam    Airway  Mallampati: III  TM distance: >3 FB  Neck ROM: full     Cardiovascular   Rhythm: regular  Rate: normal     Dental    Pulmonary   Breath sounds clear to auscultation     Abdominal   Abdomen: soft  Bowel sounds: normal           Anesthesia Plan    History of general anesthesia?: yes  History of complications of general anesthesia?: no    ASA 2     MAC     The patient is not a current smoker.  Education provided regarding risk of obstructive sleep apnea.  intravenous induction   Postoperative administration of opioids is intended.  Anesthetic plan and risks discussed with patient.    Plan discussed with attending.

## 2024-11-21 ENCOUNTER — APPOINTMENT (OUTPATIENT)
Dept: RADIOLOGY | Facility: HOSPITAL | Age: 44
End: 2024-11-21
Payer: COMMERCIAL

## 2024-11-21 ENCOUNTER — APPOINTMENT (OUTPATIENT)
Dept: UROLOGY | Facility: HOSPITAL | Age: 44
End: 2024-11-21
Payer: COMMERCIAL

## 2024-11-21 ENCOUNTER — HOSPITAL ENCOUNTER (INPATIENT)
Facility: HOSPITAL | Age: 44
Discharge: HOME | End: 2024-11-21
Attending: UROLOGY | Admitting: UROLOGY
Payer: COMMERCIAL

## 2024-11-21 ENCOUNTER — CLINICAL SUPPORT (OUTPATIENT)
Dept: EMERGENCY MEDICINE | Facility: HOSPITAL | Age: 44
End: 2024-11-21
Payer: COMMERCIAL

## 2024-11-21 DIAGNOSIS — T86.19: Primary | ICD-10-CM

## 2024-11-21 DIAGNOSIS — E87.6 HYPOKALEMIA: ICD-10-CM

## 2024-11-21 DIAGNOSIS — T83.84XA: ICD-10-CM

## 2024-11-21 DIAGNOSIS — T81.89XA POSTOPERATIVE LEAK: ICD-10-CM

## 2024-11-21 LAB
ABO GROUP (TYPE) IN BLOOD: NORMAL
ALBUMIN SERPL BCP-MCNC: 4.9 G/DL (ref 3.4–5)
ALP SERPL-CCNC: 62 U/L (ref 33–120)
ALT SERPL W P-5'-P-CCNC: 25 U/L (ref 10–52)
ANION GAP SERPL CALC-SCNC: 15 MMOL/L
ANTIBODY SCREEN: NORMAL
APPEARANCE UR: ABNORMAL
APTT PPP: 22 SECONDS (ref 27–38)
AST SERPL W P-5'-P-CCNC: 20 U/L (ref 9–39)
ATRIAL RATE: 104 BPM
BASOPHILS # BLD AUTO: 0.05 X10*3/UL (ref 0–0.1)
BASOPHILS NFR BLD AUTO: 0.5 %
BILIRUB SERPL-MCNC: 0.5 MG/DL (ref 0–1.2)
BILIRUB UR STRIP.AUTO-MCNC: ABNORMAL MG/DL
BUN SERPL-MCNC: 20 MG/DL (ref 6–23)
CALCIUM SERPL-MCNC: 10.1 MG/DL (ref 8.6–10.6)
CHLORIDE SERPL-SCNC: 98 MMOL/L (ref 98–107)
CO2 SERPL-SCNC: 27 MMOL/L (ref 21–32)
COLOR UR: ABNORMAL
CREAT SERPL-MCNC: 1.12 MG/DL (ref 0.5–1.3)
EGFRCR SERPLBLD CKD-EPI 2021: 83 ML/MIN/1.73M*2
EOSINOPHIL # BLD AUTO: 0.34 X10*3/UL (ref 0–0.7)
EOSINOPHIL NFR BLD AUTO: 3.4 %
ERYTHROCYTE [DISTWIDTH] IN BLOOD BY AUTOMATED COUNT: 12.4 % (ref 11.5–14.5)
GLUCOSE SERPL-MCNC: 97 MG/DL (ref 74–99)
GLUCOSE UR STRIP.AUTO-MCNC: NORMAL MG/DL
HCT VFR BLD AUTO: 38.2 % (ref 41–52)
HGB BLD-MCNC: 13.7 G/DL (ref 13.5–17.5)
HYALINE CASTS #/AREA URNS AUTO: ABNORMAL /LPF
IMM GRANULOCYTES # BLD AUTO: 0.05 X10*3/UL (ref 0–0.7)
IMM GRANULOCYTES NFR BLD AUTO: 0.5 % (ref 0–0.9)
INR PPP: 1 (ref 0.9–1.1)
KETONES UR STRIP.AUTO-MCNC: NEGATIVE MG/DL
LACTATE SERPL-SCNC: 1.3 MMOL/L (ref 0.4–2)
LEUKOCYTE ESTERASE UR QL STRIP.AUTO: NEGATIVE
LYMPHOCYTES # BLD AUTO: 2.58 X10*3/UL (ref 1.2–4.8)
LYMPHOCYTES NFR BLD AUTO: 25.7 %
MCH RBC QN AUTO: 29.1 PG (ref 26–34)
MCHC RBC AUTO-ENTMCNC: 35.9 G/DL (ref 32–36)
MCV RBC AUTO: 81 FL (ref 80–100)
MONOCYTES # BLD AUTO: 0.86 X10*3/UL (ref 0.1–1)
MONOCYTES NFR BLD AUTO: 8.6 %
MUCOUS THREADS #/AREA URNS AUTO: ABNORMAL /LPF
NEUTROPHILS # BLD AUTO: 6.15 X10*3/UL (ref 1.2–7.7)
NEUTROPHILS NFR BLD AUTO: 61.3 %
NITRITE UR QL STRIP.AUTO: ABNORMAL
NRBC BLD-RTO: 0 /100 WBCS (ref 0–0)
P AXIS: 73 DEGREES
P OFFSET: 185 MS
P ONSET: 165 MS
PH UR STRIP.AUTO: 5.5 [PH]
PLATELET # BLD AUTO: 307 X10*3/UL (ref 150–450)
POTASSIUM SERPL-SCNC: 2.7 MMOL/L (ref 3.5–5.3)
PR INTERVAL: 126 MS
PROT SERPL-MCNC: 8 G/DL (ref 6.4–8.2)
PROT UR STRIP.AUTO-MCNC: ABNORMAL MG/DL
PROTHROMBIN TIME: 11.5 SECONDS (ref 9.8–12.8)
Q ONSET: 218 MS
QRS COUNT: 17 BEATS
QRS DURATION: 80 MS
QT INTERVAL: 328 MS
QTC CALCULATION(BAZETT): 431 MS
QTC FREDERICIA: 394 MS
R AXIS: 28 DEGREES
RBC # BLD AUTO: 4.71 X10*6/UL (ref 4.5–5.9)
RBC # UR STRIP.AUTO: ABNORMAL /UL
RBC #/AREA URNS AUTO: >20 /HPF
RH FACTOR (ANTIGEN D): NORMAL
SODIUM SERPL-SCNC: 137 MMOL/L (ref 136–145)
SP GR UR STRIP.AUTO: 1.02
T AXIS: 65 DEGREES
T OFFSET: 382 MS
UROBILINOGEN UR STRIP.AUTO-MCNC: ABNORMAL MG/DL
VENTRICULAR RATE: 104 BPM
WBC # BLD AUTO: 10 X10*3/UL (ref 4.4–11.3)
WBC #/AREA URNS AUTO: ABNORMAL /HPF

## 2024-11-21 PROCEDURE — 7100000011 HC EXTENDED STAY RECOVERY HOURLY - NURSING UNIT

## 2024-11-21 PROCEDURE — 93005 ELECTROCARDIOGRAM TRACING: CPT

## 2024-11-21 PROCEDURE — 36415 COLL VENOUS BLD VENIPUNCTURE: CPT | Performed by: EMERGENCY MEDICINE

## 2024-11-21 PROCEDURE — 85025 COMPLETE CBC W/AUTO DIFF WBC: CPT | Performed by: EMERGENCY MEDICINE

## 2024-11-21 PROCEDURE — 2500000002 HC RX 250 W HCPCS SELF ADMINISTERED DRUGS (ALT 637 FOR MEDICARE OP, ALT 636 FOR OP/ED)

## 2024-11-21 PROCEDURE — 99285 EMERGENCY DEPT VISIT HI MDM: CPT | Mod: 25

## 2024-11-21 PROCEDURE — 85610 PROTHROMBIN TIME: CPT

## 2024-11-21 PROCEDURE — 99285 EMERGENCY DEPT VISIT HI MDM: CPT | Performed by: EMERGENCY MEDICINE

## 2024-11-21 PROCEDURE — 36415 COLL VENOUS BLD VENIPUNCTURE: CPT

## 2024-11-21 PROCEDURE — 2500000001 HC RX 250 WO HCPCS SELF ADMINISTERED DRUGS (ALT 637 FOR MEDICARE OP)

## 2024-11-21 PROCEDURE — 2500000004 HC RX 250 GENERAL PHARMACY W/ HCPCS (ALT 636 FOR OP/ED)

## 2024-11-21 PROCEDURE — 87086 URINE CULTURE/COLONY COUNT: CPT | Performed by: EMERGENCY MEDICINE

## 2024-11-21 PROCEDURE — 83605 ASSAY OF LACTIC ACID: CPT | Performed by: EMERGENCY MEDICINE

## 2024-11-21 PROCEDURE — 74176 CT ABD & PELVIS W/O CONTRAST: CPT | Performed by: RADIOLOGY

## 2024-11-21 PROCEDURE — 81001 URINALYSIS AUTO W/SCOPE: CPT | Performed by: EMERGENCY MEDICINE

## 2024-11-21 PROCEDURE — 80053 COMPREHEN METABOLIC PANEL: CPT | Performed by: EMERGENCY MEDICINE

## 2024-11-21 PROCEDURE — 74176 CT ABD & PELVIS W/O CONTRAST: CPT

## 2024-11-21 PROCEDURE — 85730 THROMBOPLASTIN TIME PARTIAL: CPT

## 2024-11-21 PROCEDURE — 86901 BLOOD TYPING SEROLOGIC RH(D): CPT

## 2024-11-21 RX ORDER — TOPIRAMATE 25 MG/1
25 TABLET ORAL DAILY
Status: DISCONTINUED | OUTPATIENT
Start: 2024-11-22 | End: 2024-11-27 | Stop reason: HOSPADM

## 2024-11-21 RX ORDER — POTASSIUM CHLORIDE 1.5 G/1.58G
20 POWDER, FOR SOLUTION ORAL ONCE
Status: COMPLETED | OUTPATIENT
Start: 2024-11-21 | End: 2024-11-21

## 2024-11-21 RX ORDER — ONDANSETRON HYDROCHLORIDE 2 MG/ML
4 INJECTION, SOLUTION INTRAVENOUS EVERY 8 HOURS PRN
Status: DISCONTINUED | OUTPATIENT
Start: 2024-11-21 | End: 2024-11-27 | Stop reason: HOSPADM

## 2024-11-21 RX ORDER — PROCHLORPERAZINE EDISYLATE 5 MG/ML
10 INJECTION INTRAMUSCULAR; INTRAVENOUS EVERY 6 HOURS PRN
Status: DISCONTINUED | OUTPATIENT
Start: 2024-11-21 | End: 2024-11-25

## 2024-11-21 RX ORDER — DULOXETIN HYDROCHLORIDE 30 MG/1
30 CAPSULE, DELAYED RELEASE ORAL DAILY
Status: DISCONTINUED | OUTPATIENT
Start: 2024-11-22 | End: 2024-11-23

## 2024-11-21 RX ORDER — ACETAMINOPHEN 325 MG/1
975 TABLET ORAL EVERY 6 HOURS
Status: DISCONTINUED | OUTPATIENT
Start: 2024-11-21 | End: 2024-11-27 | Stop reason: HOSPADM

## 2024-11-21 RX ORDER — POTASSIUM CHLORIDE 20 MEQ/1
40 TABLET, EXTENDED RELEASE ORAL ONCE
Status: COMPLETED | OUTPATIENT
Start: 2024-11-21 | End: 2024-11-21

## 2024-11-21 RX ORDER — PHENAZOPYRIDINE HYDROCHLORIDE 200 MG/1
200 TABLET, FILM COATED ORAL
Status: COMPLETED | OUTPATIENT
Start: 2024-11-22 | End: 2024-11-23

## 2024-11-21 RX ORDER — POTASSIUM CHLORIDE 14.9 MG/ML
20 INJECTION INTRAVENOUS
Status: ACTIVE | OUTPATIENT
Start: 2024-11-21 | End: 2024-11-21

## 2024-11-21 RX ORDER — ACETAMINOPHEN 325 MG/1
650 TABLET ORAL EVERY 4 HOURS PRN
Status: DISCONTINUED | OUTPATIENT
Start: 2024-11-21 | End: 2024-11-21

## 2024-11-21 RX ORDER — OXYCODONE HYDROCHLORIDE 5 MG/1
10 TABLET ORAL EVERY 4 HOURS PRN
Status: DISCONTINUED | OUTPATIENT
Start: 2024-11-21 | End: 2024-11-27 | Stop reason: HOSPADM

## 2024-11-21 RX ORDER — ONDANSETRON 4 MG/1
4 TABLET, FILM COATED ORAL EVERY 8 HOURS PRN
Status: DISCONTINUED | OUTPATIENT
Start: 2024-11-21 | End: 2024-11-27 | Stop reason: HOSPADM

## 2024-11-21 RX ORDER — BACITRACIN ZINC 500 UNIT/G
OINTMENT (GRAM) TOPICAL AS NEEDED
Status: DISCONTINUED | OUTPATIENT
Start: 2024-11-21 | End: 2024-11-25

## 2024-11-21 RX ORDER — PROCHLORPERAZINE MALEATE 10 MG
10 TABLET ORAL EVERY 6 HOURS PRN
Status: DISCONTINUED | OUTPATIENT
Start: 2024-11-21 | End: 2024-11-25

## 2024-11-21 RX ORDER — POTASSIUM CHLORIDE 14.9 MG/ML
20 INJECTION INTRAVENOUS
Status: DISPENSED | OUTPATIENT
Start: 2024-11-21 | End: 2024-11-21

## 2024-11-21 RX ORDER — LABETALOL HYDROCHLORIDE 5 MG/ML
10 INJECTION, SOLUTION INTRAVENOUS EVERY 4 HOURS PRN
Status: DISCONTINUED | OUTPATIENT
Start: 2024-11-21 | End: 2024-11-25

## 2024-11-21 RX ORDER — OXYBUTYNIN CHLORIDE 5 MG/1
5 TABLET ORAL 3 TIMES DAILY PRN
Status: DISCONTINUED | OUTPATIENT
Start: 2024-11-21 | End: 2024-11-25

## 2024-11-21 RX ORDER — METHOCARBAMOL 500 MG/1
500 TABLET, FILM COATED ORAL EVERY 6 HOURS SCHEDULED
Status: DISCONTINUED | OUTPATIENT
Start: 2024-11-22 | End: 2024-11-23

## 2024-11-21 RX ORDER — AMOXICILLIN 250 MG
2 CAPSULE ORAL 2 TIMES DAILY
Status: DISCONTINUED | OUTPATIENT
Start: 2024-11-21 | End: 2024-11-27 | Stop reason: HOSPADM

## 2024-11-21 RX ORDER — POLYETHYLENE GLYCOL 3350 17 G/17G
17 POWDER, FOR SOLUTION ORAL DAILY PRN
COMMUNITY
Start: 2024-10-22 | End: 2024-12-06 | Stop reason: WASHOUT

## 2024-11-21 RX ORDER — OXYCODONE AND ACETAMINOPHEN 5; 325 MG/1; MG/1
1 TABLET ORAL EVERY 8 HOURS PRN
COMMUNITY
End: 2024-11-27 | Stop reason: HOSPADM

## 2024-11-21 RX ORDER — HYDRALAZINE HYDROCHLORIDE 20 MG/ML
10 INJECTION INTRAMUSCULAR; INTRAVENOUS EVERY 6 HOURS PRN
Status: DISCONTINUED | OUTPATIENT
Start: 2024-11-21 | End: 2024-11-25

## 2024-11-21 RX ORDER — PROCHLORPERAZINE 25 MG/1
25 SUPPOSITORY RECTAL EVERY 12 HOURS PRN
Status: DISCONTINUED | OUTPATIENT
Start: 2024-11-21 | End: 2024-11-25

## 2024-11-21 RX ORDER — OXYCODONE HYDROCHLORIDE 5 MG/1
5 TABLET ORAL EVERY 6 HOURS PRN
Status: DISCONTINUED | OUTPATIENT
Start: 2024-11-21 | End: 2024-11-27 | Stop reason: HOSPADM

## 2024-11-21 RX ADMIN — POTASSIUM CHLORIDE 40 MEQ: 1500 TABLET, EXTENDED RELEASE ORAL at 22:15

## 2024-11-21 RX ADMIN — POTASSIUM CHLORIDE 20 MEQ: 1.5 POWDER, FOR SOLUTION ORAL at 22:18

## 2024-11-21 RX ADMIN — ACETAMINOPHEN 975 MG: 325 TABLET ORAL at 22:15

## 2024-11-21 RX ADMIN — POTASSIUM CHLORIDE 20 MEQ: 14.9 INJECTION, SOLUTION INTRAVENOUS at 21:25

## 2024-11-21 RX ADMIN — OXYCODONE 10 MG: 5 TABLET ORAL at 21:14

## 2024-11-21 ASSESSMENT — PAIN DESCRIPTION - ORIENTATION
ORIENTATION: LEFT
ORIENTATION: LEFT

## 2024-11-21 ASSESSMENT — PAIN SCALES - GENERAL
PAINLEVEL_OUTOF10: 9
PAINLEVEL_OUTOF10: 8

## 2024-11-21 ASSESSMENT — PAIN DESCRIPTION - LOCATION: LOCATION: ABDOMEN

## 2024-11-21 ASSESSMENT — PAIN DESCRIPTION - ONSET: ONSET: ONGOING

## 2024-11-21 ASSESSMENT — PAIN DESCRIPTION - PROGRESSION: CLINICAL_PROGRESSION: GRADUALLY IMPROVING

## 2024-11-21 ASSESSMENT — PAIN - FUNCTIONAL ASSESSMENT: PAIN_FUNCTIONAL_ASSESSMENT: 0-10

## 2024-11-21 ASSESSMENT — PAIN DESCRIPTION - FREQUENCY: FREQUENCY: CONSTANT/CONTINUOUS

## 2024-11-21 ASSESSMENT — PAIN DESCRIPTION - PAIN TYPE: TYPE: ACUTE PAIN;SURGICAL PAIN

## 2024-11-21 ASSESSMENT — PAIN DESCRIPTION - DESCRIPTORS: DESCRIPTORS: ACHING

## 2024-11-21 NOTE — PROGRESS NOTES
Expected Patient  Facility: home  Service: uro    HPI: Patient with a postprocedure urine leak from an anterior tube.  Has a nephrostomy drain the leak is coming from an old anterior drain.  Urology to be contacted will see and evaluate the patient they request noncontrast CT of the abdomen and pelvis    Former[facility]

## 2024-11-21 NOTE — H&P
"  [facility] [place]  History and Physical    CC:   Chief Complaint   Patient presents with    Post-op Problem       HPI: Skyler Shelton is a 44-year-old male with a history of hypertension, depression, YI, left renal mass status post open partial nephrectomy on 10/21/2024 C/B urine leak requiring left abdominal drain placement 10/31/24 and left stent and nicole placement 11/18 who presents with left flank/ abdominal pain and increasing drainage around abdominal drain.    Patient noted 2-day history of worsening left flank pain and associated bulging, which he noted was consistent with previous urine leak findings.  Patient denied fever, chills, nausea, vomiting, decreases in p.o. intake.  Patient notified urology office and was asked to go to emergency room for CT scan and further evaluation.    AF, VSS  WBC: 10.0  K+: 2.7  Cr: 1.12  UA: NIT + (on Pyridium), blood +, LE neg    ROS: 12pt ROS otherwise negative unless stated above in HPI      Past Medical History:   Diagnosis Date    Chronic headaches     Depression     Hypertension     Obesity     Sleep apnea      Past Surgical History:   Procedure Laterality Date    ANAL FISTULOTOMY      COLONOSCOPY      NEPHRECTOMY       Social History     Tobacco Use    Smoking status: Never    Smokeless tobacco: Never   Vaping Use    Vaping status: Never Used   Substance Use Topics    Alcohol use: Yes     Comment: rare    Drug use: Never     Family History   Problem Relation Name Age of Onset    Hypertension Mother      Hypertension Father      Heart attack Father      Breast cancer Sister       Allergies   Allergen Reactions    Penicillins Rash and Unknown       Objective  Vitals:    11/21/24 1515 11/21/24 1903   BP: 141/90 122/83   BP Location: Left arm    Patient Position: Sitting    Pulse: (!) 112 88   Resp: 18 18   Temp: 36.9 °C (98.4 °F)    TempSrc: Temporal    SpO2: 96% 98%   Weight: 108 kg (238 lb)    Height: 1.727 m (5' 8\")      No intake or output data in the 24 hours " ending 11/21/24 1937    Physical Exam    General: resting comfortably in bed  Neuro: alert and oriented, no obvious focal deficit   Head/Neck: normocephalic, atraumatic  ENT: moist mucous membranes  CV: regular rate and rhythm  Pulm: nonlabored breathing on room air, no conversational dyspnea  Abd: soft, nondistended, nontender, left flank/abdominal drain with clear orange fluid in bag, site covered with bandage with irritation from adhesive around periphery.  Mild clear leakage / exudate around drain  : nicole catheter draining clear orange urine consistent with Pyridium use.  MSK: REZA, no gross deformities  Psych: mood and behavior normal    Labs  Lab Results   Component Value Date    WBC 10.0 11/21/2024    HGB 13.7 11/21/2024    HCT 38.2 (L) 11/21/2024    MCV 81 11/21/2024     11/21/2024     Lab Results   Component Value Date    GLUCOSE 97 11/21/2024    CALCIUM 10.1 11/21/2024     11/21/2024    K 2.7 (LL) 11/21/2024    CO2 27 11/21/2024    CL 98 11/21/2024    BUN 20 11/21/2024    CREATININE 1.12 11/21/2024         Imaging  CT AP:  IMPRESSION:  1. Slight interval enlargement of a left posterolateral body wall  fluid collection with a percutaneous pigtail catheter at the far  anteroinferior aspect, not definitively contained within the  collection and possibly slightly retracted from the prior exam. This  collection tracks through the left lateral abdominal wall musculature  from the left kidney.  2. There has been interval resolution of the previously visualized  gas throughout the left renal cortex with persistent heterogeneous  low-density appearance of the posterolateral left upper renal pole.  No discrete intrarenal collection component is visualized.  3. Left nephroureterostomy catheter in place with mild left  periureteral fat stranding. No hydronephrosis.    Assessment & Plan  Skyler Shelton is a 44-year-old male here with urine leak s/p open partial nephrectomy on 10/21/2024. Patient had left  abdominal drain placement 10/31/24 and left stent and nicole placement 11/18 and presented 11/21 with left flank/abdominal pain and increasing drainage drain.    Neuro  -Tylenol, oxy 5/10, dilaudid breakthrough  -Home robaxin, cymbalta  -Home topiramate    Cardiovascular  -vitals/monitoring  -Held home HTN meds; PRN labetolol, hydralazine    Pulm  -encourage IS, OOB    GI  -Reg diet now  - NPO @ [facility] for IR procedure    /Renal  -Daily RFP; repleted K+  -IR consult for drain replacement placed; will add on to schedule for 11/22  -Maintain nicole; okay if needs to be removed per patient tolerance  -Home pyrdium, oxybutynin for catheter pain    ID  -WBC wnl; no s/s of infection  -UA: pos NIT 2/2 pyrdium    Heme  -Hb, WBC WNL. No CBC needed for 11/22    MSK  -PT/OT  -encourage ambulation as tolerated    Prophylaxis: SCDs  Dispo: RNF    Seen with chief resident Dr. Joyce and discussed with attending Dr. Shiva Casas MD   [facility] [place]  Adult Urology Pager: [phone]  Pediatric Urology Pager: [phone]

## 2024-11-21 NOTE — ED PROVIDER NOTES
Emergency Department Provider Note        History of Present Illness     History provided by: Patient  Limitations to History: None  External Records Reviewed with Brief Summary:  op note 24; office visit 24 after drain placement, discussed stent placement, preprocedure visit     HPI:  Skyler Shelton is a 44 y.o. male s/p left open partial nephrectomy 10/21/24 for renal mass, s/p cystoscopy with retrograde pyelogram and ureteral stent  presents to ED for leakage and pain at drainage site. He reports leakage from drain for weeks.  Patient reports that drainage has worsened today.  He does report the pain radiates to the left side of his back; he denies any midline tenderness or sensory changes.  He reports he saw his urologist who recommended admission.  He denies any hematuria, does note orange discoloration of urine due to medication. Patient denies fever, shortness of breath, chest pain, diarrhea, constipation.     Physical Exam   Triage vitals:  T 36.9 °C (98.4 °F)  HR (!) 112  /90  RR 18  O2 96 % None (Room air)    General: Awake, alert, in no acute distress  Eyes: Gaze conjugate.  No scleral icterus or injection  HENT: Normo-cephalic, atraumatic. No stridor  CV: Tachycardic rate, regular rhythm. Radial pulses 2+ bilaterally  Resp: Breathing non-labored, speaking in full sentences.  Clear to auscultation bilaterally  GI: Soft, non-tender. No rebound or guarding.  Bandage located around drain site, leakage present.  Mild erythema around catheter site.  MSK/Extremities: No gross bony deformities. Moving all extremities  Skin: Warm. Appropriate color  Neuro: Alert. Oriented. Face symmetric. Speech is fluent.  Gross strength and sensation intact in b/l UE and LEs  Psych: Appropriate mood and affect    Medical Decision Making & ED Course   Medical Decision Makin y.o. male s/p left open partial nephrectomy 10/21/24 for renal mass, s/p cystoscopy with retrograde pyelogram and ureteral  stent 11/18 presents to ED for leakage and pain at drainage site.  Patient currently tachycardic, likely due to the pain.  Pain medication was ordered for symptomatic treatment. Exam notable for bandage located around drain site, leakage present.  Mild erythema around catheter site likely due to skin irritation.  Patient has no leukocytosis or fever, no notable drainage.  Less concerning for site infection at this time.  Preop labs ordered.  UA ordered to rule out any UTI.  Neurology was consulted and per the recommendations, noncontrast CT abdomen/pelvis ordered.  Labs were notable for hypokalemia, which meds for repletion were ordered. CT notable for slight interval enlargement of a left posterolateral body wall fluid collection with a percutaneous pigtail catheter at the far anteroinferior aspect, not definitively contained within the collection and possibly slightly retracted from the prior exam. Per radiology, this collection tracks through the left lateral abdominal wall musculature from the left kidney.   Patient admitted to urology.    ----  Differential diagnoses considered include but are not limited to: Stent malfunction, nephrolithiasis, UTI, site infection     Social Determinants of Health which Significantly Impact Care: None identified     EKG Independent Interpretation: EKG interpreted by myself. Please see ED Course for full interpretation.    Independent Result Review and Interpretation: Relevant laboratory and radiographic results were reviewed and independently interpreted by myself.  As necessary, they are commented on in the ED Course.    Chronic conditions affecting the patient's care: As documented above in [facility]    The patient was discussed with the following consultants/services:  Urology    Care Considerations: As documented above in [facility]    ED Course:  ED Course as of 11/22/24 1523   u Nov 21, 2024 1949 POTASSIUM(!!): 2.7  Potassium repletion ordered [AT]   1948 Lactate: 1.3 [AT]   1949  Leukocyte Esterase, Urine: NEGATIVE [AT]   1948 Nitrite, Urine(!): 1+ [AT]   1948 WBC, Urine(!): 6-10  Will defer to urology regarding Abx [AT]      ED Course User Index  [AT] Marleny Bay MD         Diagnoses as of 11/22/24 1523   Pain due to ureteral stent, initial encounter (CMS-HCC)   Postoperative leak   Urine leak in renal parenchyma after kidney transplant ([facility])     Disposition   As a result of their workup, the patient will require admission to the hospital.  The patient was informed of his diagnosis.  The patient was given the opportunity to ask questions and I answered them. The patient agreed to be admitted to the hospital.    Procedures   Procedures    Patient seen and discussed with ED attending physician.    Marleny Bay MD  Emergency Medicine            Marleny Bay MD  Resident  11/22/24 1523

## 2024-11-21 NOTE — Clinical Note
Old drain removed. New 10F drain placed and connected to CRISTIAN bulb. Pt received 2mg versed and 100mcg fentanyl for sedation; tolerated well. VSS throughout procedure. Dressing is clean, dry, and intact. Pt to RPCU; report to RPCU RN.

## 2024-11-21 NOTE — PROGRESS NOTES
FUV    Last Visit: 11/14/24     HISTORY OF PRESENT ILLNESS:   Skyler Shelton is a 44 y.o. male who is being seen today for POV s/p left open partial nephrectomy 10/21/24 for renal mass   - abdominal US on 9/11/24 demonstrating a left 2.7 cm cystic lesion with possible peripheral solid component.   PAST MEDICAL HISTORY:  Past Medical History:   Diagnosis Date    Chronic headaches     Depression     Hypertension     Obesity     Sleep apnea        PAST SURGICAL HISTORY:  Past Surgical History:   Procedure Laterality Date    ANAL FISTULOTOMY      COLONOSCOPY      NEPHRECTOMY          ALLERGIES:   Allergies   Allergen Reactions    Penicillins Rash and Unknown        MEDICATIONS:   Current Outpatient Medications   Medication Instructions    acetaminophen (TYLENOL) 975 mg, oral, Every 6 hours    bacitracin 500 unit/gram ointment Topical, 3 times daily, Apply to tip of penis for catheter discomfort as needed    DULoxetine (Cymbalta) 60 mg DR capsule 1 capsule, Daily (0630)    DULoxetine (CYMBALTA) 30 mg, Daily    ERGOCALCIFEROL, VITAMIN D2, ORAL 5,000 Units, Daily    hydroCHLOROthiazide (HYDRODIURIL) 25 mg, Daily    losartan (COZAAR) 100 mg, Daily    methocarbamol (ROBAXIN) 500 mg, oral, Every 6 hours scheduled    oxybutynin (DITROPAN) 5 mg, oral, 3 times daily PRN    oxyCODONE (ROXICODONE) 5 mg, oral, Every 6 hours PRN    phenazopyridine (PYRIDIUM) 200 mg, oral, 3 times daily (morning, midday, late afternoon)    sildenafil (Viagra) 50 mg tablet 1 tablet, As needed    topiramate (TOPAMAX) 25 mg, Daily        PHYSICAL EXAM:  There were no vitals taken for this visit.  Constitutional: Patient appears well-developed and well-nourished. No distress.    Pulmonary/Chest: Effort normal. No respiratory distress.   Abdominal: Soft, ND NT  : WNL  Musculoskeletal: Normal range of motion.    Neurological: Alert and oriented to person, place, and time.  Psychiatric: Normal mood and affect. Behavior is normal. Thought content normal.   "    Labs:  No results found for: \"TESTOSTERONE\"  No results found for: \"PSA\"  No components found for: \"CBC\"  Lab Results   Component Value Date    CREATININE 1.08 11/10/2024     No components found for: \"TESTOTMS\"  No results found for: \"TESTF\"    Imaging: Renal CT on 9/19/24 demonstrated 3.3 cm Bosniak 4 cystic and solid mass arising from the posterior medial  interpolar right renal cortex concerning for solid renal neoplasm.  No evidence of adrenal or renal vein involvement.    2.8 cm Bosniak 2F cystic lesion arising from the superior pole of the left kidney.  No lymphadenopathy within the abdomen. (Upon reviewing imaging from [facility] with patient , right kidney was found to be totally normal and both Bosniak 4 mass and Bosniak 2F cyst were on the left kidney. We will call the radiologist and discuss this)     Surgical pathology 10/21/24: Renal cell carcinoma, clear-cell type, with cystic and degenerative changes   Results reviewed with patient. Patient reassured.     Discussion: Patient is s/p left open partial nephrectomy 10/21/24 for renal mass. Presented to the ED 11/10/24 for severe leakage around the CRISTIAN drain around the incision site. Patient had left abdominal drain placed percutaneously with US Guidance and on  Friday started draining a few blood clots and has some increased pain.  No fever or chills.  No nausea vomiting or diarrhea.  No history of trauma or injury. They put a stoma bag around the drain. We discussed a stent placement to help the drainage decrease faster. Warning signs reviewed. Will come back for double J stent placement. Having discomfort from drain,, offered IR, but pt declines.   Assessment:      No diagnosis found.       NPV for left renal mass  Plan:   Schedule for Cysto left retrograde stent placement   All questions and concerns were addressed. Patient verbalizes understanding and has no other questions at this time.     Scribe Attestation  By signing my name below, I, " Purnima Farah   attest that this documentation has been prepared under the direction and in the presence of Baldo Shannon MD.

## 2024-11-21 NOTE — Clinical Note
Drain reposition performed. 10Fr drain connected to CRISTIAN bulb placed L flank. Drain sutured in place and stayfix dressing applied. Patient received 3mg versed, 150mcg fentanyl IVP for sedation during case. No visible bleeding or hematoma at L flank site. VSS throughout procedure.

## 2024-11-21 NOTE — ED TRIAGE NOTES
Patient recently had urology procedure (Cystoscopy with Insertion Stent Ureter (L). Cystoscopy with Retrograde Pyelogram) and is presenting with pain and leakage around incision site. Patient was sent to ED by urologist for admission. Patient has left nephrostomy drain, urinary catheter.

## 2024-11-22 ENCOUNTER — APPOINTMENT (OUTPATIENT)
Dept: RADIOLOGY | Facility: HOSPITAL | Age: 44
DRG: 700 | End: 2024-11-22
Payer: COMMERCIAL

## 2024-11-22 LAB
ALBUMIN SERPL BCP-MCNC: 4.2 G/DL (ref 3.4–5)
ANION GAP SERPL CALC-SCNC: 11 MMOL/L (ref 10–20)
BACTERIA UR CULT: NO GROWTH
BUN SERPL-MCNC: 19 MG/DL (ref 6–23)
CALCIUM SERPL-MCNC: 9.2 MG/DL (ref 8.6–10.6)
CHLORIDE SERPL-SCNC: 99 MMOL/L (ref 98–107)
CO2 SERPL-SCNC: 29 MMOL/L (ref 21–32)
CREAT SERPL-MCNC: 1.03 MG/DL (ref 0.5–1.3)
EGFRCR SERPLBLD CKD-EPI 2021: >90 ML/MIN/1.73M*2
GLUCOSE SERPL-MCNC: 86 MG/DL (ref 74–99)
HOLD SPECIMEN: NORMAL
PHOSPHATE SERPL-MCNC: 4.8 MG/DL (ref 2.5–4.9)
POTASSIUM SERPL-SCNC: 3.1 MMOL/L (ref 3.5–5.3)
SODIUM SERPL-SCNC: 136 MMOL/L (ref 136–145)

## 2024-11-22 PROCEDURE — 2500000004 HC RX 250 GENERAL PHARMACY W/ HCPCS (ALT 636 FOR OP/ED): Performed by: NURSE PRACTITIONER

## 2024-11-22 PROCEDURE — 49406 IMAGE CATH FLUID PERI/RETRO: CPT

## 2024-11-22 PROCEDURE — 2500000001 HC RX 250 WO HCPCS SELF ADMINISTERED DRUGS (ALT 637 FOR MEDICARE OP)

## 2024-11-22 PROCEDURE — 36415 COLL VENOUS BLD VENIPUNCTURE: CPT

## 2024-11-22 PROCEDURE — 2500000004 HC RX 250 GENERAL PHARMACY W/ HCPCS (ALT 636 FOR OP/ED)

## 2024-11-22 PROCEDURE — C1729 CATH, DRAINAGE: HCPCS

## 2024-11-22 PROCEDURE — 2720000007 HC OR 272 NO HCPCS

## 2024-11-22 PROCEDURE — 7100000010 HC PHASE TWO TIME - EACH INCREMENTAL 1 MINUTE

## 2024-11-22 PROCEDURE — 0W9G30Z DRAINAGE OF PERITONEAL CAVITY WITH DRAINAGE DEVICE, PERCUTANEOUS APPROACH: ICD-10-PCS | Performed by: RADIOLOGY

## 2024-11-22 PROCEDURE — 99152 MOD SED SAME PHYS/QHP 5/>YRS: CPT

## 2024-11-22 PROCEDURE — 1170000001 HC PRIVATE ONCOLOGY ROOM DAILY

## 2024-11-22 PROCEDURE — RXMED WILLOW AMBULATORY MEDICATION CHARGE

## 2024-11-22 PROCEDURE — 2500000004 HC RX 250 GENERAL PHARMACY W/ HCPCS (ALT 636 FOR OP/ED): Performed by: RADIOLOGY

## 2024-11-22 PROCEDURE — 84100 ASSAY OF PHOSPHORUS: CPT

## 2024-11-22 PROCEDURE — 2500000002 HC RX 250 W HCPCS SELF ADMINISTERED DRUGS (ALT 637 FOR MEDICARE OP, ALT 636 FOR OP/ED)

## 2024-11-22 PROCEDURE — 7100000009 HC PHASE TWO TIME - INITIAL BASE CHARGE

## 2024-11-22 RX ORDER — OXYCODONE HYDROCHLORIDE 5 MG/1
5 TABLET ORAL EVERY 6 HOURS PRN
Qty: 8 TABLET | Refills: 0 | Status: SHIPPED | OUTPATIENT
Start: 2024-11-22 | End: 2024-11-24

## 2024-11-22 RX ORDER — FENTANYL CITRATE 50 UG/ML
INJECTION, SOLUTION INTRAMUSCULAR; INTRAVENOUS
Status: COMPLETED | OUTPATIENT
Start: 2024-11-22 | End: 2024-11-22

## 2024-11-22 RX ORDER — POTASSIUM CHLORIDE 14.9 MG/ML
20 INJECTION INTRAVENOUS
Status: COMPLETED | OUTPATIENT
Start: 2024-11-22 | End: 2024-11-22

## 2024-11-22 RX ORDER — MIDAZOLAM HYDROCHLORIDE 1 MG/ML
INJECTION INTRAMUSCULAR; INTRAVENOUS
Status: COMPLETED | OUTPATIENT
Start: 2024-11-22 | End: 2024-11-22

## 2024-11-22 RX ORDER — POTASSIUM CHLORIDE 14.9 MG/ML
20 INJECTION INTRAVENOUS
Status: ACTIVE | OUTPATIENT
Start: 2024-11-22 | End: 2024-11-22

## 2024-11-22 RX ADMIN — ACETAMINOPHEN 975 MG: 325 TABLET ORAL at 03:32

## 2024-11-22 RX ADMIN — FENTANYL CITRATE 50 MCG: 50 INJECTION, SOLUTION INTRAMUSCULAR; INTRAVENOUS at 12:49

## 2024-11-22 RX ADMIN — METHOCARBAMOL TABLETS 500 MG: 500 TABLET, COATED ORAL at 13:47

## 2024-11-22 RX ADMIN — MIDAZOLAM HYDROCHLORIDE 1 MG: 1 INJECTION, SOLUTION INTRAMUSCULAR; INTRAVENOUS at 12:44

## 2024-11-22 RX ADMIN — METHOCARBAMOL TABLETS 500 MG: 500 TABLET, COATED ORAL at 17:54

## 2024-11-22 RX ADMIN — ACETAMINOPHEN 975 MG: 325 TABLET ORAL at 16:39

## 2024-11-22 RX ADMIN — METHOCARBAMOL TABLETS 500 MG: 500 TABLET, COATED ORAL at 23:03

## 2024-11-22 RX ADMIN — TOPIRAMATE 25 MG: 25 TABLET, FILM COATED ORAL at 08:10

## 2024-11-22 RX ADMIN — OXYBUTYNIN CHLORIDE 5 MG: 5 TABLET ORAL at 04:38

## 2024-11-22 RX ADMIN — ACETAMINOPHEN 975 MG: 325 TABLET ORAL at 22:41

## 2024-11-22 RX ADMIN — SENNOSIDES AND DOCUSATE SODIUM 2 TABLET: 50; 8.6 TABLET ORAL at 08:09

## 2024-11-22 RX ADMIN — MIDAZOLAM HYDROCHLORIDE 1 MG: 1 INJECTION, SOLUTION INTRAMUSCULAR; INTRAVENOUS at 12:49

## 2024-11-22 RX ADMIN — OXYCODONE 10 MG: 5 TABLET ORAL at 13:46

## 2024-11-22 RX ADMIN — OXYCODONE 10 MG: 5 TABLET ORAL at 03:33

## 2024-11-22 RX ADMIN — SENNOSIDES AND DOCUSATE SODIUM 2 TABLET: 50; 8.6 TABLET ORAL at 20:21

## 2024-11-22 RX ADMIN — PHENAZOPYRIDINE 200 MG: 200 TABLET ORAL at 08:09

## 2024-11-22 RX ADMIN — OXYCODONE 10 MG: 5 TABLET ORAL at 20:22

## 2024-11-22 RX ADMIN — OXYCODONE HYDROCHLORIDE 5 MG: 5 TABLET ORAL at 08:08

## 2024-11-22 RX ADMIN — POTASSIUM CHLORIDE 20 MEQ: 14.9 INJECTION, SOLUTION INTRAVENOUS at 09:56

## 2024-11-22 RX ADMIN — METHOCARBAMOL TABLETS 500 MG: 500 TABLET, COATED ORAL at 05:09

## 2024-11-22 RX ADMIN — SENNOSIDES AND DOCUSATE SODIUM 2 TABLET: 50; 8.6 TABLET ORAL at 00:16

## 2024-11-22 RX ADMIN — ACETAMINOPHEN 975 MG: 325 TABLET ORAL at 10:06

## 2024-11-22 RX ADMIN — POTASSIUM CHLORIDE 20 MEQ: 14.9 INJECTION, SOLUTION INTRAVENOUS at 13:47

## 2024-11-22 RX ADMIN — METHOCARBAMOL TABLETS 500 MG: 500 TABLET, COATED ORAL at 00:17

## 2024-11-22 RX ADMIN — FENTANYL CITRATE 50 MCG: 50 INJECTION, SOLUTION INTRAMUSCULAR; INTRAVENOUS at 12:44

## 2024-11-22 RX ADMIN — PHENAZOPYRIDINE 200 MG: 200 TABLET ORAL at 13:47

## 2024-11-22 RX ADMIN — POTASSIUM CHLORIDE 20 MEQ: 14.9 INJECTION, SOLUTION INTRAVENOUS at 00:22

## 2024-11-22 RX ADMIN — PHENAZOPYRIDINE 200 MG: 200 TABLET ORAL at 16:40

## 2024-11-22 RX ADMIN — DULOXETINE HYDROCHLORIDE 30 MG: 30 CAPSULE, DELAYED RELEASE ORAL at 08:09

## 2024-11-22 SDOH — SOCIAL STABILITY: SOCIAL INSECURITY: WITHIN THE LAST YEAR, HAVE YOU BEEN AFRAID OF YOUR PARTNER OR EX-PARTNER?: NO

## 2024-11-22 SDOH — SOCIAL STABILITY: SOCIAL INSECURITY: DO YOU FEEL UNSAFE GOING BACK TO THE PLACE WHERE YOU ARE LIVING?: NO

## 2024-11-22 SDOH — SOCIAL STABILITY: SOCIAL INSECURITY: ABUSE: ADULT

## 2024-11-22 SDOH — SOCIAL STABILITY: SOCIAL INSECURITY: HAS ANYONE EVER THREATENED TO HURT YOUR FAMILY OR YOUR PETS?: NO

## 2024-11-22 SDOH — ECONOMIC STABILITY: HOUSING INSECURITY: IN THE PAST 12 MONTHS, HOW MANY TIMES HAVE YOU MOVED WHERE YOU WERE LIVING?: 0

## 2024-11-22 SDOH — ECONOMIC STABILITY: FOOD INSECURITY: WITHIN THE PAST 12 MONTHS, THE FOOD YOU BOUGHT JUST DIDN'T LAST AND YOU DIDN'T HAVE MONEY TO GET MORE.: PATIENT DECLINED

## 2024-11-22 SDOH — SOCIAL STABILITY: SOCIAL INSECURITY: WERE YOU ABLE TO COMPLETE ALL THE BEHAVIORAL HEALTH SCREENINGS?: YES

## 2024-11-22 SDOH — SOCIAL STABILITY: SOCIAL INSECURITY: DOES ANYONE TRY TO KEEP YOU FROM HAVING/CONTACTING OTHER FRIENDS OR DOING THINGS OUTSIDE YOUR HOME?: NO

## 2024-11-22 SDOH — SOCIAL STABILITY: SOCIAL INSECURITY: HAVE YOU HAD ANY THOUGHTS OF HARMING ANYONE ELSE?: NO

## 2024-11-22 SDOH — SOCIAL STABILITY: SOCIAL INSECURITY: HAVE YOU HAD THOUGHTS OF HARMING ANYONE ELSE?: NO

## 2024-11-22 SDOH — SOCIAL STABILITY: SOCIAL INSECURITY: ARE THERE ANY APPARENT SIGNS OF INJURIES/BEHAVIORS THAT COULD BE RELATED TO ABUSE/NEGLECT?: NO

## 2024-11-22 SDOH — SOCIAL STABILITY: SOCIAL INSECURITY: ARE YOU OR HAVE YOU BEEN THREATENED OR ABUSED PHYSICALLY, EMOTIONALLY, OR SEXUALLY BY ANYONE?: NO

## 2024-11-22 SDOH — SOCIAL STABILITY: SOCIAL INSECURITY: WITHIN THE LAST YEAR, HAVE YOU BEEN HUMILIATED OR EMOTIONALLY ABUSED IN OTHER WAYS BY YOUR PARTNER OR EX-PARTNER?: NO

## 2024-11-22 SDOH — SOCIAL STABILITY: SOCIAL INSECURITY: DO YOU FEEL ANYONE HAS EXPLOITED OR TAKEN ADVANTAGE OF YOU FINANCIALLY OR OF YOUR PERSONAL PROPERTY?: NO

## 2024-11-22 SDOH — ECONOMIC STABILITY: INCOME INSECURITY
IN THE PAST 12 MONTHS HAS THE ELECTRIC, GAS, OIL, OR WATER COMPANY THREATENED TO SHUT OFF SERVICES IN YOUR HOME?: PATIENT DECLINED

## 2024-11-22 SDOH — ECONOMIC STABILITY: FOOD INSECURITY
WITHIN THE PAST 12 MONTHS, YOU WORRIED THAT YOUR FOOD WOULD RUN OUT BEFORE YOU GOT THE MONEY TO BUY MORE.: PATIENT DECLINED

## 2024-11-22 SDOH — ECONOMIC STABILITY: TRANSPORTATION INSECURITY
IN THE PAST 12 MONTHS, HAS LACK OF TRANSPORTATION KEPT YOU FROM MEDICAL APPOINTMENTS OR FROM GETTING MEDICATIONS?: PATIENT DECLINED

## 2024-11-22 SDOH — ECONOMIC STABILITY: HOUSING INSECURITY: AT ANY TIME IN THE PAST 12 MONTHS, WERE YOU HOMELESS OR LIVING IN A SHELTER (INCLUDING NOW)?: PATIENT DECLINED

## 2024-11-22 SDOH — ECONOMIC STABILITY: HOUSING INSECURITY: IN THE LAST 12 MONTHS, WAS THERE A TIME WHEN YOU WERE NOT ABLE TO PAY THE MORTGAGE OR RENT ON TIME?: PATIENT DECLINED

## 2024-11-22 SDOH — ECONOMIC STABILITY: FOOD INSECURITY: HOW HARD IS IT FOR YOU TO PAY FOR THE VERY BASICS LIKE FOOD, HOUSING, MEDICAL CARE, AND HEATING?: PATIENT DECLINED

## 2024-11-22 ASSESSMENT — COGNITIVE AND FUNCTIONAL STATUS - GENERAL
PATIENT BASELINE BEDBOUND: NO
DAILY ACTIVITIY SCORE: 23
MOBILITY SCORE: 24
DRESSING REGULAR LOWER BODY CLOTHING: A LITTLE
DAILY ACTIVITIY SCORE: 23
DRESSING REGULAR LOWER BODY CLOTHING: A LITTLE
MOBILITY SCORE: 24

## 2024-11-22 ASSESSMENT — PAIN DESCRIPTION - DESCRIPTORS
DESCRIPTORS: ACHING
DESCRIPTORS: ACHING

## 2024-11-22 ASSESSMENT — PAIN - FUNCTIONAL ASSESSMENT
PAIN_FUNCTIONAL_ASSESSMENT: 0-10

## 2024-11-22 ASSESSMENT — LIFESTYLE VARIABLES
AUDIT-C TOTAL SCORE: 6
HOW OFTEN DO YOU HAVE 6 OR MORE DRINKS ON ONE OCCASION: MONTHLY
HOW OFTEN DO YOU HAVE A DRINK CONTAINING ALCOHOL: 2-4 TIMES A MONTH
HOW OFTEN DURING THE LAST YEAR HAVE YOU FAILED TO DO WHAT WAS NORMALLY EXPECTED FROM YOU BECAUSE OF DRINKING: NEVER
HOW OFTEN DURING THE LAST YEAR HAVE YOU FOUND THAT YOU WERE NOT ABLE TO STOP DRINKING ONCE YOU HAD STARTED: NEVER
HOW OFTEN DO YOU HAVE A DRINK CONTAINING ALCOHOL: 2-4 TIMES A MONTH
AUDIT-C TOTAL SCORE: 6
HOW MANY STANDARD DRINKS CONTAINING ALCOHOL DO YOU HAVE ON A TYPICAL DAY: 5 OR 6
AUDIT-C TOTAL SCORE: 6
AUDIT TOTAL SCORE: 0
HOW OFTEN DO YOU HAVE 6 OR MORE DRINKS ON ONE OCCASION: MONTHLY
HAS A RELATIVE, FRIEND, DOCTOR, OR ANOTHER HEALTH PROFESSIONAL EXPRESSED CONCERN ABOUT YOUR DRINKING OR SUGGESTED YOU CUT DOWN: NO
SKIP TO QUESTIONS 9-10: 0
SKIP TO QUESTIONS 9-10: 0
HOW OFTEN DURING THE LAST YEAR HAVE YOU HAD A FEELING OF GUILT OR REMORSE AFTER DRINKING: NEVER
AUDIT-C TOTAL SCORE: 6
HOW OFTEN DURING THE LAST YEAR HAVE YOU BEEN UNABLE TO REMEMBER WHAT HAPPENED THE NIGHT BEFORE BECAUSE YOU HAD BEEN DRINKING: NEVER
HAVE YOU OR SOMEONE ELSE BEEN INJURED AS A RESULT OF YOUR DRINKING: NO
HOW OFTEN DURING THE LAST YEAR HAVE YOU NEEDED AN ALCOHOLIC DRINK FIRST THING IN THE MORNING TO GET YOURSELF GOING AFTER A NIGHT OF HEAVY DRINKING: NEVER
AUDIT TOTAL SCORE: 6
HOW MANY STANDARD DRINKS CONTAINING ALCOHOL DO YOU HAVE ON A TYPICAL DAY: 5 OR 6

## 2024-11-22 ASSESSMENT — ACTIVITIES OF DAILY LIVING (ADL)
HEARING - RIGHT EAR: FUNCTIONAL
WALKS IN HOME: INDEPENDENT
TOILETING: INDEPENDENT
FEEDING YOURSELF: INDEPENDENT
GROOMING: INDEPENDENT
BATHING: NEEDS ASSISTANCE
HEARING - LEFT EAR: FUNCTIONAL
ADEQUATE_TO_COMPLETE_ADL: YES
JUDGMENT_ADEQUATE_SAFELY_COMPLETE_DAILY_ACTIVITIES: YES
ASSISTIVE_DEVICE: EYEGLASSES
PATIENT'S MEMORY ADEQUATE TO SAFELY COMPLETE DAILY ACTIVITIES?: YES
LACK_OF_TRANSPORTATION: PATIENT DECLINED
LACK_OF_TRANSPORTATION: PATIENT DECLINED
DRESSING YOURSELF: NEEDS ASSISTANCE

## 2024-11-22 ASSESSMENT — PAIN SCALES - GENERAL
PAINLEVEL_OUTOF10: 4
PAINLEVEL_OUTOF10: 0 - NO PAIN
PAINLEVEL_OUTOF10: 0 - NO PAIN
PAINLEVEL_OUTOF10: 5 - MODERATE PAIN
PAINLEVEL_OUTOF10: 0 - NO PAIN
PAINLEVEL_OUTOF10: 5 - MODERATE PAIN
PAINLEVEL_OUTOF10: 3
PAINLEVEL_OUTOF10: 7
PAINLEVEL_OUTOF10: 0 - NO PAIN
PAINLEVEL_OUTOF10: 8
PAINLEVEL_OUTOF10: 0 - NO PAIN

## 2024-11-22 ASSESSMENT — PAIN DESCRIPTION - LOCATION: LOCATION: BACK

## 2024-11-22 ASSESSMENT — PATIENT HEALTH QUESTIONNAIRE - PHQ9
SUM OF ALL RESPONSES TO PHQ9 QUESTIONS 1 & 2: 0
1. LITTLE INTEREST OR PLEASURE IN DOING THINGS: NOT AT ALL
1. LITTLE INTEREST OR PLEASURE IN DOING THINGS: NOT AT ALL
SUM OF ALL RESPONSES TO PHQ9 QUESTIONS 1 & 2: 0
2. FEELING DOWN, DEPRESSED OR HOPELESS: NOT AT ALL
2. FEELING DOWN, DEPRESSED OR HOPELESS: NOT AT ALL

## 2024-11-22 NOTE — PROGRESS NOTES
Pharmacy Admission Order Reconciliation Review    Skyler Shelton is a 44 y.o. male admitted for Urine leak in renal parenchyma after kidney transplant ([facility]). Pharmacy reviewed the patient's unreconciled admission medications.    Prior to admission medications that were reviewed and acted on by the pharmacist include:  Percocet  Miralax   These medications have been reconciled.     Any other unreconcilied medications have been addressed and will be ordered or held by the patient's medical team. Medications addressed by the pharmacist may be added or changed by the patient's medical team at any time.    Eli Maya, PharmD  Transitions of Care Pharmacist  [facility]  Please reach out via Secure Chat for questions

## 2024-11-22 NOTE — POST-PROCEDURE NOTE
Interventional Radiology Brief Postprocedure Note    Attending: Dr. Marsha Cortez    Assistant: Dr. Pedro Chowdary    Diagnosis:   1. Urine leak in renal parenchyma after kidney transplant ([facility])        2. Pain due to ureteral stent, initial encounter (CMS-HCC)        3. Postoperative leak              Description of procedure: Image guided replacement with 10 Slovak drain placement into subcutaneous fluid collection as dictated in further detail within PACs.    Anesthesia:  MAC Moderate    Complications: None    Estimated Blood Loss: none    Medications  As of 11/22/24 1309      oxyCODONE (Roxicodone) immediate release tablet 5 mg (mg) Total dose:  5 mg Dosing weight:  108      Date/Time Rate/Dose/Volume Action       11/22/24  0808 5 mg Given               oxyCODONE (Roxicodone) immediate release tablet 10 mg (mg) Total dose:  20 mg* Dosing weight:  108   *Administration not included in total     Date/Time Rate/Dose/Volume Action       11/21/24  2114 10 mg Given     11/22/24  0017 *10 mg Missed      0333 10 mg Given               sennosides-docusate sodium (Stacia-Colace) 8.6-50 mg per tablet 2 tablet (tablet) Total dose:  4 tablet Dosing weight:  108      Date/Time Rate/Dose/Volume Action       11/22/24  0016 2 tablet Given      0809 2 tablet Given               acetaminophen (Tylenol) tablet 975 mg (mg) Total dose:  2,925 mg Dosing weight:  108      Date/Time Rate/Dose/Volume Action       11/21/24  2215 975 mg Given     11/22/24  0332 975 mg Given      1006 975 mg Given               DULoxetine (Cymbalta) DR capsule 30 mg (mg) Total dose:  30 mg Dosing weight:  108      Date/Time Rate/Dose/Volume Action       11/22/24  0809 30 mg Given               methocarbamol (Robaxin) tablet 500 mg (mg) Total dose:  1,000 mg Dosing weight:  108      Date/Time Rate/Dose/Volume Action       11/22/24  0017 500 mg Given      0509 500 mg Given               oxybutynin (Ditropan) tablet 5 mg (mg) Total dose:  5 mg Dosing weight:  108       Date/Time Rate/Dose/Volume Action       11/22/24  0438 5 mg Given               phenazopyridine (Pyridium) tablet 200 mg (mg) Total dose:  200 mg Dosing weight:  108      Date/Time Rate/Dose/Volume Action       11/22/24  0809 200 mg Given               topiramate (Topamax) tablet 25 mg (mg) Total dose:  25 mg Dosing weight:  108      Date/Time Rate/Dose/Volume Action       11/22/24  0810 25 mg Given               potassium chloride 20 mEq in sterile water for injection 100 mL (mEq) Total dose:  0 mEq* Dosing weight:  108   *Administration not included in total     Date/Time Rate/Dose/Volume Action       11/22/24  0014 *20 mEq - 50 mL/hr (over 120 min) Missed      0021 *20 mEq - 50 mL/hr (over 120 min) Missed               potassium chloride 20 mEq in sterile water for injection 100 mL (mL/hr) Total volume:  Not documented* Dosing weight:  108   *Total volume has not been documented. View each administration to see the amount administered.     Date/Time Rate/Dose/Volume Action       11/21/24  2125 20 mEq - 50 mL/hr (over 120 min) New Bag     11/22/24  0014  (over 120 min) Stopped      0015 *20 mEq - 50 mL/hr (over 120 min) Missed      0956 20 mEq - 50 mL/hr (over 120 min) New Bag               potassium chloride 20 mEq in sterile water for injection 100 mL (mL/hr) Total dose:  20 mEq* Dosing weight:  108   *From user-documented volume     Date/Time Rate/Dose/Volume Action       11/22/24  0022 20 mEq - 50 mL/hr (over 120 min) New Bag      0152 *20 mEq - 50 mL/hr (over 120 min) Missed      0216 100 mL Stopped               potassium chloride (Klor-Con) packet 20 mEq (mEq) Total dose:  20 mEq Dosing weight:  108      Date/Time Rate/Dose/Volume Action       11/21/24 2218 20 mEq Given               potassium chloride CR (Klor-Con M20) ER tablet 40 mEq (mEq) Total dose:  40 mEq Dosing weight:  108      Date/Time Rate/Dose/Volume Action       11/21/24 2215 40 mEq Given               fentaNYL PF (Sublimaze) injection  (mcg) Total dose:  100 mcg      Date/Time Rate/Dose/Volume Action       11/22/24  1244 50 mcg Given      1249 50 mcg Given               midazolam (Versed) injection (mg) Total dose:  2 mg      Date/Time Rate/Dose/Volume Action       11/22/24  1244 1 mg Given      1249 1 mg Given                   No specimens collected      See detailed result report with images in PACS.    The patient tolerated the procedure well without incident or complication and is in stable condition.

## 2024-11-22 NOTE — PRE-PROCEDURE NOTE
Interventional Radiology Preprocedure Note    Indication for procedure: The primary encounter diagnosis was Urine leak in renal parenchyma after kidney transplant ([facility]). Diagnoses of Pain due to ureteral stent, initial encounter (CMS-HCC) and Postoperative leak were also pertinent to this visit.    Relevant review of systems: NA    Relevant Labs:   Lab Results   Component Value Date    CREATININE 1.03 11/22/2024    EGFR >90 11/22/2024    INR 1.0 11/21/2024    PROTIME 11.5 11/21/2024       Planned Sedation/Anesthesia: Moderate    Airway assessment: normal    Directed physical examination:    Aox3  Normal rate of respirations    Mallampati: II (hard and soft palate, upper portion of tonsils and uvula visible)    ASA Score: ASA 2 - Patient with mild systemic disease with no functional limitations    Benefits, risks and alternatives of procedure and planned sedation have been discussed with the patient and/or their representative. All questions answered and they agree to proceed.

## 2024-11-22 NOTE — CARE PLAN
Problem: Pain  Goal: Takes deep breaths with improved pain control throughout the shift  Reactivated  Goal: Turns in bed with improved pain control throughout the shift  Reactivated  Goal: Walks with improved pain control throughout the shift  Reactivated  Goal: Performs ADL's with improved pain control throughout shift  Reactivated  Goal: Participates in PT with improved pain control throughout the shift  Reactivated  Goal: Free from opioid side effects throughout the shift  Reactivated  Goal: Free from acute confusion related to pain meds throughout the shift  Reactivated     Problem: Skin  Goal: Decreased wound size/increased tissue granulation at next dressing change  Reactivated  Goal: Participates in plan/prevention/treatment measures  Reactivated  Goal: Prevent/manage excess moisture  Reactivated  Goal: Prevent/minimize sheer/friction injuries  Reactivated  Goal: Promote/optimize nutrition  Reactivated  Goal: Promote skin healing  Reactivated   The patient's goals for the shift include      The clinical goals for the shift include patients comfort will be maintained or improved

## 2024-11-22 NOTE — CARE PLAN
Problem: Pain  Goal: Takes deep breaths with improved pain control throughout the shift  11/22/2024 0745 by Hector Puente RN  Outcome: Progressing  11/22/2024 0737 by Hector Puente RN  Reactivated  Goal: Turns in bed with improved pain control throughout the shift  11/22/2024 0745 by Hector Puente RN  Outcome: Progressing  11/22/2024 0737 by Hector Puente RN  Reactivated  Goal: Walks with improved pain control throughout the shift  11/22/2024 0745 by Hector Puente RN  Outcome: Progressing  11/22/2024 0737 by Hector Puente RN  Reactivated  Goal: Performs ADL's with improved pain control throughout shift  11/22/2024 0745 by Hector Puente RN  Outcome: Progressing  11/22/2024 0737 by Hector Puente RN  Reactivated  Goal: Participates in PT with improved pain control throughout the shift  11/22/2024 0745 by Hector Puente RN  Outcome: Progressing  11/22/2024 0737 by Hector Puente RN  Reactivated  Goal: Free from opioid side effects throughout the shift  11/22/2024 0745 by Hector Puente RN  Outcome: Progressing  11/22/2024 0737 by Hector Puente RN  Reactivated  Goal: Free from acute confusion related to pain meds throughout the shift  11/22/2024 0745 by Hector Puente RN  Outcome: Progressing  11/22/2024 0737 by Hector Puente RN  Reactivated     Problem: Skin  Goal: Decreased wound size/increased tissue granulation at next dressing change  11/22/2024 0745 by Hector Puente RN  Outcome: Progressing  11/22/2024 0737 by Hector Puente RN  Reactivated  Goal: Participates in plan/prevention/treatment measures  11/22/2024 0745 by Hector Puente RN  Outcome: Progressing  11/22/2024 0737 by Hector Puente RN  Reactivated  Goal: Prevent/manage excess moisture  11/22/2024 0745 by Hector Puente RN  Outcome: Progressing  11/22/2024 0737 by Hector Puente RN  Reactivated  Goal: Prevent/minimize  sheer/friction injuries  11/22/2024 0745 by Hector Puente RN  Outcome: Progressing  11/22/2024 0737 by Hector Puente RN  Reactivated  Goal: Promote/optimize nutrition  11/22/2024 0745 by Hectro Puente RN  Outcome: Progressing  11/22/2024 0737 by Hector Puente RN  Reactivated  Goal: Promote skin healing  11/22/2024 0745 by Hector Puente RN  Outcome: Progressing  11/22/2024 0737 by Hector Puente RN  Reactivated   The patient's goals for the shift include      The clinical goals for the shift include patients comfort will be maintained or improved

## 2024-11-22 NOTE — PROGRESS NOTES
Pharmacy Medication History Review    Skyler Shelton is a 44 y.o. male admitted for Urine leak in renal parenchyma after kidney transplant ([facility]). Pharmacy reviewed the patient's [id number] medications and allergies for accuracy.    Medications ADDED:  Oxycodone-acetaminophen 5-325 mg   Medications CHANGED:  Patient prescribed methocarbamol 500 mg 1 tablet every 6 hours for 5 days - patient taking differently - patient taking prn   Patient prescribed oxycodone-acetaminophen 5-325 mg 1 tablet every 6 hours for up to 3 days - patient taking differently - patient taking prn   Patient prescribed sildenafil 50 mg 1 tablet by mouth fro erectile dysfunction  - patient taking differently - patient taking prn   Patient prescribed polyethylene glycol 3350 powder packet 1 once dailiy for 7 days - patient taking differently - patient taking prn   Medications REMOVED:   None      The list below reflects the updated PTA list.   Prior to Admission Medications   Prescriptions Last Dose Informant   DULoxetine (Cymbalta) 30 mg DR capsule 11/21/2024 Morning Self   Sig: Take 1 capsule (30 mg) by mouth once daily. Take with 60 mg capsule for 90 mg dose. Do not crush or chew.   DULoxetine (Cymbalta) 60 mg DR capsule 11/21/2024 Morning Self   Sig: Take 1 capsule (60 mg) by mouth early in the morning..   ERGOCALCIFEROL, VITAMIN D2, ORAL 11/21/2024 Morning Self   Sig: Take 5,000 Units by mouth once daily.   acetaminophen (Tylenol) 325 mg tablet Unknown Self   Sig: Take 3 tablets (975 mg) by mouth every 6 hours.   bacitracin 500 unit/gram ointment 11/21/2024 Self   Sig: Apply topically 3 times a day. Apply to tip of penis for catheter discomfort as needed   hydroCHLOROthiazide (HYDRODiuril) 25 mg tablet 11/21/2024 Morning Self   Sig: Take 1 tablet (25 mg) by mouth once daily.   losartan (Cozaar) 100 mg tablet 11/21/2024 Morning Self   Sig: Take 1 tablet (100 mg) by mouth once daily.   methocarbamol (Robaxin) 500 mg tablet  11/21/2024 Self   Sig: Take 1 tablet (500 mg) by mouth every 6 hours for 5 days.  Patient taking prn    oxyCODONE (Roxicodone) 5 mg immediate release tablet Unknown Self   Sig: Take 1 tablet (5 mg) by mouth every 6 hours if needed for severe pain (7 - 10) for up to 6 doses.  Patient not taking    oxyCODONE-acetaminophen (Percocet) 5-325 mg tablet Unknown Self   Sig: Take 1 tablet by mouth every 8 hours if needed for severe pain (7 - 10).  Patient taking prn    oxybutynin (Ditropan) 5 mg tablet Not Taking Self   Sig: Take 1 tablet (5 mg) by mouth 3 times a day as needed (As needed for bladder spasms).   Patient not taking: Reported on 11/21/2024   phenazopyridine (Pyridium) 200 mg tablet 11/21/2024 Self   Sig: Take 1 tablet (200 mg) by mouth 3 times daily (morning, midday, late afternoon) for 14 days.   polyethylene glycol (Glycolax, Miralax) 17 gram/dose powder  Self   Sig: Mix 17 g of powder and drink once daily as needed for constipation.  Patient taking prn    sildenafil (Viagra) 50 mg tablet Unknown Self   Sig: Take 1 tablet (50 mg) by mouth if needed for erectile dysfunction.  Patient taking prn    topiramate (Topamax) 25 mg tablet 11/21/2024 Morning Self   Sig: Take 1 tablet (25 mg) by mouth once daily.      Facility-Administered Medications: None        The list below reflects the updated allergy list. Please review each documented allergy for additional clarification and justification.  Allergies  Reviewed by Ruslan Chaidez on 11/21/2024        Severity Reactions Comments    Penicillins Low Rash, Unknown             Patient accepts M2B at discharge.     Sources:   Patient interview   Epic dispense history   Epic allergy list   OARRS ( YES )    Additional Comments:  Patient knows medication name dose and frequency  Patient combines duloxetine 60 mg and duloxetine 30 mg to make a daily dose of 90 mg   Patient states that he is on day 3 of phenazopyridine 200 mg - patient prescribed this medication for a 14 day  "therapy course     OARRS  10/30/2024 OXYCODONE 5 MG DS: 2 QTY: 6  11/10/2024 OXYCODONE-ACETAMINOPHEN 5-325 MG DS: 3 QTY:12           Ruslan Chaidez  Pharmacy Technician  11/21/24     Secure Chat preferred   If no response call [phone] or Vocera \"Med Rec\"   "

## 2024-11-22 NOTE — PROGRESS NOTES
[facility]  Daily Progress Note      Patient: Skyler Shelton  Age/Sex: 44 y.o., male  MRN: [id number]  Date of surgery: N/A  Admit Date: 11/21/2024   Code Status: Full Code  Length of Stay: 0      Interval History/Overnight Events:   NAEON  Afebrile, VSS  Denies fever, chills, nausea, vomiting and shortness of breath  Repots ongoing pain, fullness over area with known urine leak  Skin around leaking CRISTIAN site improved with application of barrier cream      HPI: Skyler Shelton is a 44-year-old male with a history of hypertension, depression, YI, left renal mass status post open partial nephrectomy on 10/21/2024 C/B urine leak requiring left abdominal drain placement 10/31/24 and left stent and nicole placement 11/18 who presents with left flank/ abdominal pain and increasing drainage around abdominal drain.     Patient noted 2-day history of worsening left flank pain and associated bulging, which he noted was consistent with previous urine leak findings.  Patient denied fever, chills, nausea, vomiting, decreases in p.o. intake.  Patient notified urology office and was asked to go to emergency room for CT scan and further evaluation.    Medications:    Current Facility-Administered Medications   Medication Dose Route Frequency Provider Last Rate Last Admin    acetaminophen (Tylenol) tablet 975 mg  975 mg oral q6h Lea Casas MD   975 mg at 11/22/24 0332    bacitracin ointment   Topical PRN Lea Casas MD        DULoxetine (Cymbalta) DR capsule 30 mg  30 mg oral Daily Lea Casas MD   30 mg at 11/22/24 0809    hydrALAZINE (Apresoline) injection 10 mg  10 mg intravenous q6h PRN Lea Casas MD        labetaloL (Normodyne,Trandate) injection 10 mg  10 mg intravenous q4h PRN Lea Casas MD        methocarbamol (Robaxin) tablet 500 mg  500 mg oral q6h SCAR eLa Casas MD   500 mg at 11/22/24 0509    ondansetron (Zofran) tablet 4 mg  4 mg oral q8h PRN Lea Casas MD        Or    ondansetron (Zofran) injection  4 mg  4 mg intravenous q8h PRN Lea Casas MD        oxybutynin (Ditropan) tablet 5 mg  5 mg oral TID PRN Lae Casas MD   5 mg at 11/22/24 0438    oxyCODONE (Roxicodone) immediate release tablet 10 mg  10 mg oral q4h PRN Lea Casas MD   10 mg at 11/22/24 0333    oxyCODONE (Roxicodone) immediate release tablet 5 mg  5 mg oral q6h PRN Lea Casas MD   5 mg at 11/22/24 0808    phenazopyridine (Pyridium) tablet 200 mg  200 mg oral TID Lea Casas MD   200 mg at 11/22/24 0809    potassium chloride 20 mEq in sterile water for injection 100 mL  20 mEq intravenous q2h Agatha Rankin, APRN-CNP        prochlorperazine (Compazine) tablet 10 mg  10 mg oral q6h PRN Lea Casas MD        Or    prochlorperazine (Compazine) injection 10 mg  10 mg intravenous q6h PRN Lea Casas MD        Or    prochlorperazine (Compazine) suppository 25 mg  25 mg rectal q12h PRN Lea Casas MD        sennosides-docusate sodium (Stacia-Colace) 8.6-50 mg per tablet 2 tablet  2 tablet oral BID Lea Casas MD   2 tablet at 11/22/24 0809    topiramate (Topamax) tablet 25 mg  25 mg oral Daily Lea Casas MD   25 mg at 11/22/24 0810       Objective    Vitals:    11/22/24 0029 11/22/24 0222 11/22/24 0400 11/22/24 0902   BP: 105/71 121/86 111/75 114/77   BP Location: Right arm Left arm Left arm Left arm   Patient Position: Lying Standing Lying Lying   Pulse: 95 97 82 70   Resp: 18 16 16 16   Temp: 36.2 °C (97.2 °F) 36.6 °C (97.9 °F) 36.4 °C (97.6 °F) 36.4 °C (97.5 °F)   TempSrc: Temporal Temporal Temporal Temporal   SpO2: 93% 93%  93%   Weight:       Height:         11/20 1900 - 11/22 0659  In: 100   Out: 425 [Urine:425]    Physical Exam                                                                                                                        General: resting comfortably in bed  Neuro: no obvious focal deficit   Head/Neck: normocephalic, atraumatic  ENT: moist mucous membranes  CV: regular rate   Pulm: nonlabored breathing on  room air, no conversational dyspnea  Abd: soft, midly distended, midly tender to palpation over L quadrans, Prior CRISTIAN site with ongoing serous leaake and mild skin irritation present, L flank IR drain with scant CYU, surgical incisions C/D/I  : nicole catheter draining clear yellow urine  MSK: REZA, no gross deformities  Psych: mood and behavior normal    Labs    Lab Results   Component Value Date    WBC 10.0 11/21/2024    HGB 13.7 11/21/2024    HCT 38.2 (L) 11/21/2024    MCV 81 11/21/2024     11/21/2024      Lab Results   Component Value Date    GLUCOSE 86 11/22/2024    CALCIUM 9.2 11/22/2024     11/22/2024    K 3.1 (L) 11/22/2024    CO2 29 11/22/2024    CL 99 11/22/2024    BUN 19 11/22/2024    CREATININE 1.03 11/22/2024        Imaging  === 11/21/24 ===    CT ABDOMEN PELVIS WO IV CONTRAST    - Impression -  1. Slight interval enlargement of a left posterolateral body wall  fluid collection with a percutaneous pigtail catheter at the far  anteroinferior aspect, not definitively contained within the  collection and possibly slightly retracted from the prior exam. This  collection tracks through the left lateral abdominal wall musculature  from the left kidney.  2. There has been interval resolution of the previously visualized  gas throughout the left renal cortex with persistent heterogeneous  low-density appearance of the posterolateral left upper renal pole.  No discrete intrarenal collection component is visualized.  3. Left nephroureterostomy catheter in place with mild left  periureteral fat stranding. No hydronephrosis.    I personally reviewed the image(s)/study and resident interpretation  as stated by Dr. Radha Escobedo MD. I agree with the findings as  stated. This study was interpreted at [facility], [place], [place].    MACRO:  None    Signed by: Martin Quinn 11/21/2024 7:13 PM  Dictation workstation:   [id number]             Assessment &  Plan  Skyler Shelton is a Skyler Shelton is a 44-year-old male with a history of hypertension, depression, YI, left renal mass status post open partial nephrectomy on 10/21/2024 C/B urine leak requiring left abdominal drain placement 10/31/24 and left stent and nicole placement 11/18 who presents with left flank/ abdominal pain and increasing drainage around abdominal drain.    Hypokalemia noted on admission to ED, EKG stable and will continue to monitor/correct as needed. Pain adequately controlled with current regimen though patient continues with significant output from prior CRISTIAN site.      Plan: 11/22  IR consult for urgent drain replacement, appreciate recommendations  Patient to remain NPO until drain replacement  Will continue with application of skin barrier cream to area of prior CRISTIAN site    Neuro  -pain control: Scheduled tylenol, scheduled methocarbamol  -continue home duloxetine  -continue home topiramate    Cardiovascular  -vitals/monitoring    Pulm  -encourage IS, OOB    GI  -diet: NPO  -bowel regimen: colace  -zofran/compazine prn for nausea/vomiting    /Renal: #hypokalemia  -BMP as indicated  -bacitracin to tip of penis prn  -oxybutynin for bladder spasms prn  -pyridium     Endo: no glycemic issues    Heme/ID  -CBC as indicated    MSK  -encourage ambulation     Prophylaxis: SCDs, no need for DVT ppx  Dispo: Pending outcome of procedure      Seen with chief resident Dr. Joyce and discussed with attending Dr. Shiva Escalante MD  Urologic Surgery PGY-1  Adult Urology Pager: [phone]  Pediatric Urology Pager: [phone]

## 2024-11-23 ENCOUNTER — PHARMACY VISIT (OUTPATIENT)
Dept: PHARMACY | Facility: CLINIC | Age: 44
End: 2024-11-23
Payer: COMMERCIAL

## 2024-11-23 LAB
ALBUMIN SERPL BCP-MCNC: 4.7 G/DL (ref 3.4–5)
ANION GAP SERPL CALC-SCNC: 13 MMOL/L (ref 10–20)
BUN SERPL-MCNC: 19 MG/DL (ref 6–23)
CALCIUM SERPL-MCNC: 10 MG/DL (ref 8.6–10.6)
CHLORIDE SERPL-SCNC: 99 MMOL/L (ref 98–107)
CO2 SERPL-SCNC: 29 MMOL/L (ref 21–32)
CREAT SERPL-MCNC: 1.07 MG/DL (ref 0.5–1.3)
EGFRCR SERPLBLD CKD-EPI 2021: 87 ML/MIN/1.73M*2
GLUCOSE SERPL-MCNC: 92 MG/DL (ref 74–99)
PHOSPHATE SERPL-MCNC: 3.8 MG/DL (ref 2.5–4.9)
POTASSIUM SERPL-SCNC: 3.4 MMOL/L (ref 3.5–5.3)
SODIUM SERPL-SCNC: 138 MMOL/L (ref 136–145)

## 2024-11-23 PROCEDURE — 80069 RENAL FUNCTION PANEL: CPT

## 2024-11-23 PROCEDURE — RXMED WILLOW AMBULATORY MEDICATION CHARGE

## 2024-11-23 PROCEDURE — 2500000001 HC RX 250 WO HCPCS SELF ADMINISTERED DRUGS (ALT 637 FOR MEDICARE OP)

## 2024-11-23 PROCEDURE — 1170000001 HC PRIVATE ONCOLOGY ROOM DAILY

## 2024-11-23 PROCEDURE — 2500000002 HC RX 250 W HCPCS SELF ADMINISTERED DRUGS (ALT 637 FOR MEDICARE OP, ALT 636 FOR OP/ED)

## 2024-11-23 PROCEDURE — 36415 COLL VENOUS BLD VENIPUNCTURE: CPT

## 2024-11-23 PROCEDURE — 2500000005 HC RX 250 GENERAL PHARMACY W/O HCPCS

## 2024-11-23 RX ORDER — DULOXETIN HYDROCHLORIDE 30 MG/1
60 CAPSULE, DELAYED RELEASE ORAL DAILY
Status: DISCONTINUED | OUTPATIENT
Start: 2024-11-24 | End: 2024-11-24

## 2024-11-23 RX ORDER — HYDROCHLOROTHIAZIDE 25 MG/1
25 TABLET ORAL DAILY
Status: DISCONTINUED | OUTPATIENT
Start: 2024-11-23 | End: 2024-11-27 | Stop reason: HOSPADM

## 2024-11-23 RX ORDER — LIDOCAINE 560 MG/1
1 PATCH PERCUTANEOUS; TOPICAL; TRANSDERMAL EVERY 24 HOURS
Status: DISCONTINUED | OUTPATIENT
Start: 2024-11-23 | End: 2024-11-27 | Stop reason: HOSPADM

## 2024-11-23 RX ORDER — METHOCARBAMOL 500 MG/1
750 TABLET, FILM COATED ORAL EVERY 6 HOURS SCHEDULED
Status: DISCONTINUED | OUTPATIENT
Start: 2024-11-23 | End: 2024-11-27 | Stop reason: HOSPADM

## 2024-11-23 RX ORDER — OXYCODONE HYDROCHLORIDE 5 MG/1
5 TABLET ORAL EVERY 6 HOURS PRN
Qty: 12 TABLET | Refills: 0 | Status: SHIPPED | OUTPATIENT
Start: 2024-11-23 | End: 2024-11-30

## 2024-11-23 RX ORDER — POTASSIUM CHLORIDE 20 MEQ/1
40 TABLET, EXTENDED RELEASE ORAL ONCE
Status: COMPLETED | OUTPATIENT
Start: 2024-11-23 | End: 2024-11-23

## 2024-11-23 RX ADMIN — PHENAZOPYRIDINE 200 MG: 200 TABLET ORAL at 11:15

## 2024-11-23 RX ADMIN — METHOCARBAMOL TABLETS 500 MG: 500 TABLET, COATED ORAL at 11:15

## 2024-11-23 RX ADMIN — PHENAZOPYRIDINE 200 MG: 200 TABLET ORAL at 16:30

## 2024-11-23 RX ADMIN — OXYCODONE 10 MG: 5 TABLET ORAL at 18:07

## 2024-11-23 RX ADMIN — OXYCODONE 10 MG: 5 TABLET ORAL at 22:28

## 2024-11-23 RX ADMIN — SENNOSIDES AND DOCUSATE SODIUM 2 TABLET: 50; 8.6 TABLET ORAL at 08:37

## 2024-11-23 RX ADMIN — ACETAMINOPHEN 975 MG: 325 TABLET ORAL at 08:40

## 2024-11-23 RX ADMIN — SENNOSIDES AND DOCUSATE SODIUM 2 TABLET: 50; 8.6 TABLET ORAL at 21:29

## 2024-11-23 RX ADMIN — DULOXETINE HYDROCHLORIDE 30 MG: 30 CAPSULE, DELAYED RELEASE ORAL at 08:37

## 2024-11-23 RX ADMIN — OXYCODONE 10 MG: 5 TABLET ORAL at 05:20

## 2024-11-23 RX ADMIN — ACETAMINOPHEN 975 MG: 325 TABLET ORAL at 03:56

## 2024-11-23 RX ADMIN — POTASSIUM CHLORIDE 40 MEQ: 1500 TABLET, EXTENDED RELEASE ORAL at 11:09

## 2024-11-23 RX ADMIN — HYDROCHLOROTHIAZIDE 25 MG: 25 TABLET ORAL at 16:30

## 2024-11-23 RX ADMIN — LIDOCAINE 1 PATCH: 4 PATCH TOPICAL at 18:08

## 2024-11-23 RX ADMIN — ACETAMINOPHEN 975 MG: 325 TABLET ORAL at 15:45

## 2024-11-23 RX ADMIN — ACETAMINOPHEN 975 MG: 325 TABLET ORAL at 21:29

## 2024-11-23 RX ADMIN — TOPIRAMATE 25 MG: 25 TABLET, FILM COATED ORAL at 08:38

## 2024-11-23 RX ADMIN — OXYCODONE 10 MG: 5 TABLET ORAL at 13:36

## 2024-11-23 RX ADMIN — PHENAZOPYRIDINE 200 MG: 200 TABLET ORAL at 08:37

## 2024-11-23 ASSESSMENT — COGNITIVE AND FUNCTIONAL STATUS - GENERAL
DAILY ACTIVITIY SCORE: 24
MOBILITY SCORE: 24

## 2024-11-23 ASSESSMENT — PAIN SCALES - GENERAL
PAINLEVEL_OUTOF10: 6
PAINLEVEL_OUTOF10: 8
PAINLEVEL_OUTOF10: 2
PAINLEVEL_OUTOF10: 7
PAINLEVEL_OUTOF10: 7
PAINLEVEL_OUTOF10: 0 - NO PAIN

## 2024-11-23 ASSESSMENT — PAIN - FUNCTIONAL ASSESSMENT: PAIN_FUNCTIONAL_ASSESSMENT: 0-10

## 2024-11-23 NOTE — DISCHARGE SUMMARY
Discharge Diagnosis  Urine leak in renal parenchyma after kidney transplant ([facility])    Issues Requiring Follow-Up  Pathology  Abdominal drain management for urine leak    Test Results Pending At Discharge  Pending Labs       Order Current Status    Extra Urine Gray Tube Collected (11/21/24 1736)    Urinalysis with Reflex Culture and Microscopic In process            Hospital Course   Skyler Shelton is a 44-year-old male with a history of hypertension, depression, YI, left renal mass status post open partial nephrectomy on 10/21/2024 C/B urine leak requiring left abdominal drain placement 10/31/24 and left stent and nicole placement 11/18 who presents with left flank/ abdominal pain and increasing drainage around abdominal drain on 11/21/2024.  Patient was admitted to urology service and on 11/22 exchange of abdominal drain by interventional radiology without complication.  Patient tolerated the procedure well and Nicole was removed day of discharge. Discharged home with abdominal drain to home with follow-up with Dr. Shannon 11/29. Patient will have RFP drawn before visit for persistent (but improving) hypokalemia.    Pertinent Physical Exam At Time of Discharge  General: resting comfortably in bed  Neuro: alert and oriented, no obvious focal deficit   Head/Neck: normocephalic, atraumatic  ENT: moist mucous membranes  CV: regular rate and rhythm  Pulm: nonlabored breathing on room air, no conversational dyspnea  Abd: soft, nondistended, nontender, left flank/abdominal drain with clear orange fluid in CRISTIAN bulb.   : Voiding spontaneously  MSK: REZA, no gross deformities  Psych: mood and behavior normal    Home Medications     Medication List      START taking these medications     oxybutynin 5 mg tablet; Commonly known as: Ditropan; Take 1 tablet (5   mg) by mouth 3 times a day as needed (As needed for bladder spasms).     CONTINUE taking these medications     acetaminophen 325 mg tablet; Commonly known as: Tylenol;  Take 3 tablets   (975 mg) by mouth every 6 hours.   * DULoxetine 30 mg DR capsule; Commonly known as: Cymbalta   * DULoxetine 60 mg DR capsule; Commonly known as: Cymbalta   ERGOCALCIFEROL (VITAMIN D2) ORAL   hydroCHLOROthiazide 25 mg tablet; Commonly known as: HYDRODiuril   losartan 100 mg tablet; Commonly known as: Cozaar   methocarbamol 500 mg tablet; Commonly known as: Robaxin; Take 1 tablet   (500 mg) by mouth every 6 hours for 5 days.   oxyCODONE 5 mg immediate release tablet; Commonly known as: Roxicodone;   Take 1 tablet (5 mg) by mouth every 6 hours if needed for severe pain (7 -   10) for up to 2 days.   oxyCODONE-acetaminophen 5-325 mg tablet; Commonly known as: Percocet   phenazopyridine 200 mg tablet; Commonly known as: Pyridium; Take 1   tablet (200 mg) by mouth 3 times daily (morning, midday, late afternoon)   for 14 days.   polyethylene glycol 17 gram/dose powder; Commonly known as: Glycolax,   Miralax   sildenafil 50 mg tablet; Commonly known as: Viagra   topiramate 25 mg tablet; Commonly known as: Topamax  * This list has 2 medication(s) that are the same as other medications   prescribed for you. Read the directions carefully, and ask your doctor or   other care provider to review them with you.     STOP taking these medications     bacitracin 500 unit/gram ointment       Outpatient Follow-Up  Future Appointments   Date Time Provider Department Center   11/29/2024 10:00 AM Baldo Shannon MD [facility] Phong Casas MD

## 2024-11-23 NOTE — DISCHARGE INSTRUCTIONS
DEPARTMENT OF Urology  DISCHARGE INSTRUCTIONS -- Nephrectomy with urine leak  Inpatient Surgery    C O N F I D E N T I A L   I N F O R M A T I O N    Skyler Shelton      Call [phone] during regular daytime business hours (8:00 am - 5:00 pm) and after 5:00 pm ask for the Urology resident with any questions or concerns.    If it is a life-threatening situation, proceed to the nearest emergency department.        Follow-up appointment:    Future Appointments   Date Time Provider Department Center   11/29/2024 10:00 AM MD ONOFRE Torres           Thank you for the opportunity to care for you today.  Your health and healing are very important to us.  We hope we made you feel as comfortable as possible and are committed to your recovery and continued well-being.      The following is a brief overview of your nephrectomy procedure. Some of the information contained on this summary may be confidential.  This information should be kept in your records and should be shared with your regular doctor.    Physicians:   Dr. Shannon    Procedure performed: removal of all or part of your left kidney  Pending Results: Pathology    What to Expect During your Recovery and Home Care  Anesthesia Side Effects   You may feel sleepy, tired, or have a sore throat.   You may also feel drowsiness, dizziness, or inability to think clearly.  For your safety, do not drive, drink alcoholic beverages, take any unprescribed medication or make any important decisions for 24 hours after anesthesia.  A responsible adult should be with you for 24 hours.        Activity and Recovery    No heavy lifting greater than 10 pounds for the next 4 weeks. No driving until mobility has returned to normal. Do not drive or operate heavy machinery while taking narcotic pain medications as these medications can alter perception, impair judgement, and slow reaction times.  Pain Control  Unfortunately, you may experience pain after your procedure.  Adequate pain management can include alternative measures to help ease your pain and that can include over the counter Tylenol/ibuprofen or oxycodone which can be taken as prescribed as needed for breakthrough pain. Do not take more than 4,000mg of Tylenol in a 24-hour period.      If your procedure was done robotically you may experience gas pains from the surgery. Getting up and moving around is the best way to relieve those pains. You can also take some over the counter gas-x(simethicone).    Nausea/Vomiting   Clear liquids are best tolerated at first. Start slow, advance your diet as tolerated to normal foods. Avoid spicy, greasy, heavy foods at first. Also, you may feel nauseous or like you need to vomit if you take any type of medication on an empty stomach.  Call your physician if you are unable to eat or drink, are not passing gas and have persistent vomiting.    Signs of Bleeding   You could have some blood in your urine off and on over the next several weeks. Your urine will be light pink to yellow. Minor bleeding or drainage may occur from the surgical sites; however, excessive or consistent bleeding should be reported to your surgeon. Excessive bleeding is defined as blood that is dripping from wound, soaking you bandages, and is ketchup colored, thick with possible blood clots.  Consistent is defined as bleeding that does not stop.      Treatment/wound care:   Keep area(s) clean and dry.   It is okay to shower 24 hours after time of surgery.    Do not scrub wound(s), pat dry.    Do not submerge wound(s) in standing water until seen for follow up appointment (no tub bathing, swimming, or hot tubs).    Clean with mild soap, gentle washing, pat dry, cover with bandage as needed.    Avoid waterproof bandages.  No oils or lotions on incisions.  Staples/sutures removed in our [id number] days after the date of surgery.  Do not remove the staples/sutures on your own.    Please visually inspect your wound(s) at  least once daily.  If the wound(s) are in a difficult to see location, please use a mirror or have someone else assist with visual inspection.    Signs of Infection  Signs of infection can include fever, chills, redness around surgical incisions, green/yellow drainage from incisions, burning sensation with passing of urine, or severe abdominal pain.  If you see any of these occur, please contact your doctor's office at [phone].  Any fever higher than 100.4, especially if associated with an ill feeling, abdominal pain, chills, or nausea should be reported to your surgeon.      Assist in bowel movements/urination  Take daily stool softener you were prescribed  Increase fiber in diet  Increase water (6 to 8 glasses)  Increase walking   Can try adding over the counter MiraLAX or in extreme cases milk of magnesia.  If you have tried these methods and you are not passing gas, your bladder still feels full and you cannot have a bowel movement, please go to your nearest Emergency room/contact your physician.    Additional Instructions:   Use a small pillow to put pressure on your belly. This can make you more comfortable when you cough, laugh or do other actions.      Left abdominal drains was placed for leakage of urine from kidney.  Maintain the drain off of suction (vital should not be decompressed/squeezed when not emptying attempts) but continue to monitor output daily.

## 2024-11-23 NOTE — PROGRESS NOTES
[facility]  Daily Progress Note      Patient: Skyler Shelton  Age/Sex: 44 y.o., male  MRN: [id number]  Date of surgery: N/A  Admit Date: 11/21/2024   Code Status: Full Code  Length of Stay: 1      Interval History/Overnight Events:   NAEON  Nicole removed this AM and passed TOV  Reports increased fullness superior to left flank incision around noon when standing up for the first time since drain placement  Tensenss and discomfort worsened when standing and improved when lying back in bed  Drain putting out 200+ so far today    HPI: Skyler Shelton is a 44-year-old male with a history of hypertension, depression, YI, left renal mass status post open partial nephrectomy on 10/21/2024 C/B urine leak requiring left abdominal drain placement 10/31/24 and left stent and nicole placement 11/18 who presents with left flank/ abdominal pain and increasing drainage around abdominal drain.     Patient noted 2-day history of worsening left flank pain and associated bulging, which he noted was consistent with previous urine leak findings.  Patient denied fever, chills, nausea, vomiting, decreases in p.o. intake.  Patient notified urology office and was asked to go to emergency room for CT scan and further evaluation.    Medications:    Current Facility-Administered Medications   Medication Dose Route Frequency Provider Last Rate Last Admin    acetaminophen (Tylenol) tablet 975 mg  975 mg oral q6h Lea Casas MD   975 mg at 11/23/24 0840    bacitracin ointment   Topical PRN Lea Casas MD        [START ON 11/24/2024] DULoxetine (Cymbalta) DR capsule 60 mg  60 mg oral Daily Lea Casas MD        hydrALAZINE (Apresoline) injection 10 mg  10 mg intravenous q6h PRN Lea Casas MD        labetaloL (Normodyne,Trandate) injection 10 mg  10 mg intravenous q4h PRN Lea Casas MD        methocarbamol (Robaxin) tablet 500 mg  500 mg oral q6h SCAR Lea Casas MD   500 mg at 11/23/24 1115    ondansetron (Zofran) tablet 4 mg  4 mg  oral q8h PRN Lea Casas MD        Or    ondansetron (Zofran) injection 4 mg  4 mg intravenous q8h PRN Lea Casas MD        oxybutynin (Ditropan) tablet 5 mg  5 mg oral TID PRN Lea Casas MD   5 mg at 11/22/24 0438    oxyCODONE (Roxicodone) immediate release tablet 10 mg  10 mg oral q4h PRN Lea Casas MD   10 mg at 11/23/24 1336    oxyCODONE (Roxicodone) immediate release tablet 5 mg  5 mg oral q6h PRN Lea Casas MD   5 mg at 11/22/24 0808    phenazopyridine (Pyridium) tablet 200 mg  200 mg oral TID Lea Casas MD   200 mg at 11/23/24 1115    prochlorperazine (Compazine) tablet 10 mg  10 mg oral q6h PRN Lea Casas MD        Or    prochlorperazine (Compazine) injection 10 mg  10 mg intravenous q6h PRN Lea Casas MD        Or    prochlorperazine (Compazine) suppository 25 mg  25 mg rectal q12h PRN Lea Casas MD        sennosides-docusate sodium (Stacia-Colace) 8.6-50 mg per tablet 2 tablet  2 tablet oral BID Lea Casas MD   2 tablet at 11/23/24 0837    topiramate (Topamax) tablet 25 mg  25 mg oral Daily Lea Casas MD   25 mg at 11/23/24 0838       Objective    Vitals:    11/22/24 2330 11/23/24 0403 11/23/24 0807 11/23/24 1132   BP: 124/80 134/88 130/89 (!) 136/91   BP Location: Left arm Left arm Left arm Left arm   Patient Position: Lying Lying Lying Sitting   Pulse: 87 81 77 94   Resp: 16 16 16 16   Temp: 36.8 °C (98.2 °F) 36.7 °C (98.1 °F) 36.5 °C (97.7 °F) 36.8 °C (98.2 °F)   TempSrc: Temporal Temporal Temporal Temporal   SpO2: 94% 93% 96% 94%   Weight:       Height:         11/21 1900 - 11/23 0659  In: 580 [P.O.:480]  Out: 2240 [Urine:2175; Drains:65]    Physical Exam                                                                                                                        General: resting comfortably in bed  Neuro: no obvious focal deficit   Head/Neck: normocephalic, atraumatic  ENT: moist mucous membranes  CV: regular rate   Pulm: nonlabored breathing on room air, no  conversational dyspnea  Abd: soft, midly distended, midly tender to palpation over L quadrants, Prior CRISTIAN site with ongoing serous leakage and mild skin irritation present, L flank IR drain with yellow urine output, surgical incisions C/D/I  : voiding spontaneously  MSK: REZA, no gross deformities  Psych: mood and behavior normal    Labs    Lab Results   Component Value Date    WBC 10.0 11/21/2024    HGB 13.7 11/21/2024    HCT 38.2 (L) 11/21/2024    MCV 81 11/21/2024     11/21/2024      Lab Results   Component Value Date    GLUCOSE 92 11/23/2024    CALCIUM 10.0 11/23/2024     11/23/2024    K 3.4 (L) 11/23/2024    CO2 29 11/23/2024    CL 99 11/23/2024    BUN 19 11/23/2024    CREATININE 1.07 11/23/2024        Imaging  === 11/21/24 ===    CT ABDOMEN PELVIS WO IV CONTRAST    - Impression -  1. Slight interval enlargement of a left posterolateral body wall  fluid collection with a percutaneous pigtail catheter at the far  anteroinferior aspect, not definitively contained within the  collection and possibly slightly retracted from the prior exam. This  collection tracks through the left lateral abdominal wall musculature  from the left kidney.  2. There has been interval resolution of the previously visualized  gas throughout the left renal cortex with persistent heterogeneous  low-density appearance of the posterolateral left upper renal pole.  No discrete intrarenal collection component is visualized.  3. Left nephroureterostomy catheter in place with mild left  periureteral fat stranding. No hydronephrosis.    I personally reviewed the image(s)/study and resident interpretation  as stated by Dr. Radha Escobedo MD. I agree with the findings as  stated. This study was interpreted at [facility], [place], [place].    MACRO:  None    Signed by: Martin Quinn 11/21/2024 7:13 PM  Dictation workstation:   [id number]             Assessment & Plan  Skyler Shelton is a  Skyler Shelton is a 44-year-old male with a history of hypertension, depression, YI, left renal mass status post open partial nephrectomy on 10/21/2024 C/B urine leak requiring left abdominal drain placement 10/31/24 and left stent and nicole placement 11/18 who presented with left flank/ abdominal pain and increasing drainage around abdominal drain. S/p IR drain replacement 11/22. \    Nicole removed 11/23 and passed TOV, however with increased flank discomfort and fullness after nicole removal and standing for first time since drain replacement.          Plan: 11/23  Nicole removed per patient request. Declining nicole replacement  Monitor exam and drain outputs    Neuro  -pain control: Scheduled tylenol, scheduled methocarbamol  -continue home duloxetine  -continue home topiramate    Cardiovascular  -vitals/monitoring  - resumed home hydrochlorothiazide 11/23  Home Losartan is held, consider resuming as bp allows    Pulm  -encourage IS, OOB    GI  -diet: reg diet  -bowel regimen: colace  -zofran/compazine prn for nausea/vomiting    /Renal: #hypokalemia  -BMP as indicated  -pyridium prn    Endo: no glycemic issues    Heme/ID  -CBC as indicated    MSK  -encourage ambulation     Prophylaxis: SCDs, no need for DVT ppx  Dispo: DC tomorrow      Seen with chief resident Dr. Joyce and discussed with attending Dr. Shiva Quinn MD  Urologic Surgery PGY-3  Adult Urology Pager: [phone]  Pediatric Urology Pager: [phone]

## 2024-11-23 NOTE — CARE PLAN
The patient's goals for the shift include      The clinical goals for the shift include patients comfort will be maintained or improved

## 2024-11-24 VITALS
TEMPERATURE: 97.9 F | WEIGHT: 238 LBS | HEART RATE: 95 BPM | HEIGHT: 68 IN | SYSTOLIC BLOOD PRESSURE: 125 MMHG | OXYGEN SATURATION: 97 % | BODY MASS INDEX: 36.07 KG/M2 | DIASTOLIC BLOOD PRESSURE: 78 MMHG | RESPIRATION RATE: 16 BRPM

## 2024-11-24 LAB
ALBUMIN SERPL BCP-MCNC: 4.6 G/DL (ref 3.4–5)
ANION GAP SERPL CALC-SCNC: 13 MMOL/L (ref 10–20)
BUN SERPL-MCNC: 19 MG/DL (ref 6–23)
CALCIUM SERPL-MCNC: 10.2 MG/DL (ref 8.6–10.6)
CHLORIDE SERPL-SCNC: 100 MMOL/L (ref 98–107)
CO2 SERPL-SCNC: 28 MMOL/L (ref 21–32)
CREAT SERPL-MCNC: 1.1 MG/DL (ref 0.5–1.3)
EGFRCR SERPLBLD CKD-EPI 2021: 84 ML/MIN/1.73M*2
GLUCOSE SERPL-MCNC: 93 MG/DL (ref 74–99)
PHOSPHATE SERPL-MCNC: 4.9 MG/DL (ref 2.5–4.9)
POTASSIUM SERPL-SCNC: 3.2 MMOL/L (ref 3.5–5.3)
SODIUM SERPL-SCNC: 138 MMOL/L (ref 136–145)

## 2024-11-24 PROCEDURE — 2500000005 HC RX 250 GENERAL PHARMACY W/O HCPCS

## 2024-11-24 PROCEDURE — 84100 ASSAY OF PHOSPHORUS: CPT

## 2024-11-24 PROCEDURE — 36415 COLL VENOUS BLD VENIPUNCTURE: CPT

## 2024-11-24 PROCEDURE — 1170000001 HC PRIVATE ONCOLOGY ROOM DAILY

## 2024-11-24 PROCEDURE — 2500000001 HC RX 250 WO HCPCS SELF ADMINISTERED DRUGS (ALT 637 FOR MEDICARE OP)

## 2024-11-24 PROCEDURE — 2500000002 HC RX 250 W HCPCS SELF ADMINISTERED DRUGS (ALT 637 FOR MEDICARE OP, ALT 636 FOR OP/ED)

## 2024-11-24 PROCEDURE — 2500000004 HC RX 250 GENERAL PHARMACY W/ HCPCS (ALT 636 FOR OP/ED): Performed by: STUDENT IN AN ORGANIZED HEALTH CARE EDUCATION/TRAINING PROGRAM

## 2024-11-24 RX ORDER — DULOXETIN HYDROCHLORIDE 30 MG/1
90 CAPSULE, DELAYED RELEASE ORAL DAILY
Status: DISCONTINUED | OUTPATIENT
Start: 2024-11-24 | End: 2024-11-27 | Stop reason: HOSPADM

## 2024-11-24 RX ORDER — KETOROLAC TROMETHAMINE 15 MG/ML
15 INJECTION, SOLUTION INTRAMUSCULAR; INTRAVENOUS EVERY 6 HOURS SCHEDULED
Status: COMPLETED | OUTPATIENT
Start: 2024-11-25 | End: 2024-11-26

## 2024-11-24 RX ORDER — LOSARTAN POTASSIUM 50 MG/1
100 TABLET ORAL DAILY
Status: DISCONTINUED | OUTPATIENT
Start: 2024-11-24 | End: 2024-11-27 | Stop reason: HOSPADM

## 2024-11-24 RX ORDER — SODIUM CHLORIDE 9 MG/ML
50 INJECTION, SOLUTION INTRAVENOUS CONTINUOUS
Status: DISCONTINUED | OUTPATIENT
Start: 2024-11-25 | End: 2024-11-25

## 2024-11-24 RX ORDER — KETOROLAC TROMETHAMINE 15 MG/ML
15 INJECTION, SOLUTION INTRAMUSCULAR; INTRAVENOUS ONCE
Status: COMPLETED | OUTPATIENT
Start: 2024-11-24 | End: 2024-11-24

## 2024-11-24 RX ORDER — POTASSIUM CHLORIDE 20 MEQ/1
40 TABLET, EXTENDED RELEASE ORAL ONCE
Status: COMPLETED | OUTPATIENT
Start: 2024-11-24 | End: 2024-11-24

## 2024-11-24 RX ADMIN — ACETAMINOPHEN 975 MG: 325 TABLET ORAL at 08:50

## 2024-11-24 RX ADMIN — ACETAMINOPHEN 975 MG: 325 TABLET ORAL at 15:22

## 2024-11-24 RX ADMIN — ACETAMINOPHEN 975 MG: 325 TABLET ORAL at 02:36

## 2024-11-24 RX ADMIN — POTASSIUM CHLORIDE 40 MEQ: 1500 TABLET, EXTENDED RELEASE ORAL at 10:44

## 2024-11-24 RX ADMIN — OXYCODONE 10 MG: 5 TABLET ORAL at 15:22

## 2024-11-24 RX ADMIN — ONDANSETRON HYDROCHLORIDE 4 MG: 4 TABLET, FILM COATED ORAL at 15:22

## 2024-11-24 RX ADMIN — KETOROLAC TROMETHAMINE 15 MG: 15 INJECTION, SOLUTION INTRAMUSCULAR; INTRAVENOUS at 16:52

## 2024-11-24 RX ADMIN — OXYCODONE 10 MG: 5 TABLET ORAL at 10:44

## 2024-11-24 RX ADMIN — LOSARTAN POTASSIUM 100 MG: 50 TABLET, FILM COATED ORAL at 08:50

## 2024-11-24 RX ADMIN — DULOXETINE HYDROCHLORIDE 90 MG: 30 CAPSULE, DELAYED RELEASE ORAL at 08:49

## 2024-11-24 RX ADMIN — METHOCARBAMOL TABLETS 750 MG: 500 TABLET, COATED ORAL at 02:36

## 2024-11-24 RX ADMIN — OXYCODONE 10 MG: 5 TABLET ORAL at 02:36

## 2024-11-24 RX ADMIN — OXYCODONE 10 MG: 5 TABLET ORAL at 06:48

## 2024-11-24 RX ADMIN — ACETAMINOPHEN 975 MG: 325 TABLET ORAL at 20:25

## 2024-11-24 RX ADMIN — HYDROCHLOROTHIAZIDE 25 MG: 25 TABLET ORAL at 08:50

## 2024-11-24 RX ADMIN — OXYCODONE 10 MG: 5 TABLET ORAL at 20:25

## 2024-11-24 RX ADMIN — SENNOSIDES AND DOCUSATE SODIUM 2 TABLET: 50; 8.6 TABLET ORAL at 08:50

## 2024-11-24 RX ADMIN — TOPIRAMATE 25 MG: 25 TABLET, FILM COATED ORAL at 08:49

## 2024-11-24 RX ADMIN — SENNOSIDES AND DOCUSATE SODIUM 2 TABLET: 50; 8.6 TABLET ORAL at 20:25

## 2024-11-24 ASSESSMENT — COGNITIVE AND FUNCTIONAL STATUS - GENERAL
MOBILITY SCORE: 24
DAILY ACTIVITIY SCORE: 24

## 2024-11-24 ASSESSMENT — PAIN SCALES - GENERAL
PAINLEVEL_OUTOF10: 7
PAINLEVEL_OUTOF10: 8
PAINLEVEL_OUTOF10: 3
PAINLEVEL_OUTOF10: 8
PAINLEVEL_OUTOF10: 3

## 2024-11-24 NOTE — PROGRESS NOTES
[facility] [place]  Daily Progress Note      Patient: Skyler Shelton  Age/Sex: 44 y.o., male  MRN: [id number]  Date of surgery: N/A  Admit Date: 11/21/2024   Code Status: Full Code  Length of Stay: 2      Interval History/Overnight Events:   NAEON  Persistent left flank discomfort  Discomfort and tenseness improves when lying down and increased when standing up. Localized superior to incision   Drain output 362 in 24 hours, not on suction    HPI: Skyler Shelton is a 44-year-old male with a history of hypertension, depression, YI, left renal mass status post open partial nephrectomy on 10/21/2024 C/B urine leak requiring left abdominal drain placement 10/31/24 and left stent and nicole placement 11/18 who presents with left flank/ abdominal pain and increasing drainage around abdominal drain.     Patient noted 2-day history of worsening left flank pain and associated bulging, which he noted was consistent with previous urine leak findings.  Patient denied fever, chills, nausea, vomiting, decreases in p.o. intake.  Patient notified urology office and was asked to go to emergency room for CT scan and further evaluation.    Medications:    Current Facility-Administered Medications   Medication Dose Route Frequency Provider Last Rate Last Admin    acetaminophen (Tylenol) tablet 975 mg  975 mg oral q6h Lea Casas MD   975 mg at 11/24/24 0850    bacitracin ointment   Topical PRN Lea Casas MD        DULoxetine (Cymbalta) DR capsule 90 mg  90 mg oral Daily Tyler Quinn MD   90 mg at 11/24/24 0849    hydrALAZINE (Apresoline) injection 10 mg  10 mg intravenous q6h PRN Lea Casas MD        hydroCHLOROthiazide (HYDRODiuril) tablet 25 mg  25 mg oral Daily Tyler Quinn MD   25 mg at 11/24/24 0850    labetaloL (Normodyne,Trandate) injection 10 mg  10 mg intravenous q4h PRN Lea Casas MD        lidocaine 4 % patch 1 patch  1 patch transdermal q24h Tyler Quinn MD   1 patch at 11/23/24 4865     losartan (Cozaar) tablet 100 mg  100 mg oral Daily Tyler Quinn MD   100 mg at 11/24/24 0850    methocarbamol (Robaxin) tablet 750 mg  750 mg oral q6h SCAR Tyler Quinn MD   750 mg at 11/24/24 0236    ondansetron (Zofran) tablet 4 mg  4 mg oral q8h PRN Lea Casas MD        Or    ondansetron (Zofran) injection 4 mg  4 mg intravenous q8h PRN Lea Casas MD        oxybutynin (Ditropan) tablet 5 mg  5 mg oral TID PRN Lea Casas MD   5 mg at 11/22/24 0438    oxyCODONE (Roxicodone) immediate release tablet 10 mg  10 mg oral q4h PRN Lea Casas MD   10 mg at 11/24/24 0648    oxyCODONE (Roxicodone) immediate release tablet 5 mg  5 mg oral q6h PRN Lea Casas MD   5 mg at 11/22/24 0808    potassium chloride CR (Klor-Con M20) ER tablet 40 mEq  40 mEq oral Once Tyler Quinn MD        prochlorperazine (Compazine) tablet 10 mg  10 mg oral q6h PRN Lea Casas MD        Or    prochlorperazine (Compazine) injection 10 mg  10 mg intravenous q6h PRN Lea Casas MD        Or    prochlorperazine (Compazine) suppository 25 mg  25 mg rectal q12h PRN Lea Casas MD        sennosides-docusate sodium (Stacia-Colace) 8.6-50 mg per tablet 2 tablet  2 tablet oral BID Lea Casas MD   2 tablet at 11/24/24 0850    topiramate (Topamax) tablet 25 mg  25 mg oral Daily Lea Casas MD   25 mg at 11/24/24 0849       Objective    Vitals:    11/23/24 1633 11/23/24 2233 11/24/24 0810 11/24/24 0850   BP: (!) 156/103 130/83 (!) 151/103    BP Location: Left arm Left arm     Patient Position: Sitting Lying     Pulse: 95 84 87 82   Resp: 16 17 18    Temp: 36.7 °C (98.1 °F) 36.7 °C (98.1 °F) 36.2 °C (97.2 °F)    TempSrc: Temporal Temporal Temporal    SpO2: 96% 94% 97%    Weight:       Height:         11/22 1900 - 11/24 0659  In: -   Out: 2322 [Urine:1950; Drains:372]    Physical Exam                                                                                                                        General: resting  comfortably in bed  Neuro: no obvious focal deficit   Head/Neck: normocephalic, atraumatic  ENT: moist mucous membranes  CV: regular rate   Pulm: nonlabored breathing on room air, no conversational dyspnea  Abd: soft, non-distended abdomen, increased swelling and tenseness superior to left flank incision upon standing and improved with lying in bed, mildly tender to palpation over L quadrants. L flank IR drain connected to bulb drain not on suction with serous yellow urine output, surgical incisions C/D/I  : voiding spontaneously  MSK: REZA, no gross deformities  Psych: mood and behavior normal    Labs    Lab Results   Component Value Date    WBC 10.0 11/21/2024    HGB 13.7 11/21/2024    HCT 38.2 (L) 11/21/2024    MCV 81 11/21/2024     11/21/2024      Lab Results   Component Value Date    GLUCOSE 93 11/24/2024    CALCIUM 10.2 11/24/2024     11/24/2024    K 3.2 (L) 11/24/2024    CO2 28 11/24/2024     11/24/2024    BUN 19 11/24/2024    CREATININE 1.10 11/24/2024        Imaging  === 11/21/24 ===    CT ABDOMEN PELVIS WO IV CONTRAST    - Impression -  1. Slight interval enlargement of a left posterolateral body wall  fluid collection with a percutaneous pigtail catheter at the far  anteroinferior aspect, not definitively contained within the  collection and possibly slightly retracted from the prior exam. This  collection tracks through the left lateral abdominal wall musculature  from the left kidney.  2. There has been interval resolution of the previously visualized  gas throughout the left renal cortex with persistent heterogeneous  low-density appearance of the posterolateral left upper renal pole.  No discrete intrarenal collection component is visualized.  3. Left nephroureterostomy catheter in place with mild left  periureteral fat stranding. No hydronephrosis.    I personally reviewed the image(s)/study and resident interpretation  as stated by Dr. Radha Escobedo MD. I agree with the  findings as  stated. This study was interpreted at [facility], [place], [place].    MACRO:  None    Signed by: Martin Quinn 11/21/2024 7:13 PM  Dictation workstation:   [id number]             Assessment & Plan  Skyler Shelton is a Skyler Shelton is a 44-year-old male with a history of hypertension, depression, YI, left renal mass status post open partial nephrectomy on 10/21/2024 C/B urine leak requiring left abdominal drain placement 10/31/24 and left stent and nicole placement 11/18 who presented with left flank/ abdominal pain and increasing drainage around abdominal drain. S/p IR drain replacement 11/22.     Nicole removed 11/23 and passed TOV, however with increased flank discomfort and fullness after nicole removal and standing for first time since drain replacement.    11/24: continues to have positional L flank swelling and discomfort        Neuro  -pain control: Scheduled tylenol, scheduled methocarbamol  -continue home duloxetine  -continue home topiramate    Cardiovascular  -vitals/monitoring  - home hydrochlorothiazide 11/23, home losartan    Pulm  -encourage IS, OOB    GI  -diet: reg diet. NPO at midnight for potential drain interrogation tomorrow  -bowel regimen: colace  -zofran/compazine prn for nausea/vomiting    /Renal: #hypokalemia  -BMP as indicated  -pyridium prn  - will re-engage IR to discuss best imaging modality given drain does not appear to be completely draining urine leak, and for drain interrogation and/or replacement    Endo: no glycemic issues    Heme/ID  -CBC as indicated    MSK  -encourage ambulation     Prophylaxis: SCDs, no need for DVT ppx  Dispo: pending       Seen with chief resident Dr. Joyce and discussed with attending Dr. Shiva Quinn MD  Urologic Surgery PGY-3  Adult Urology Pager: [phone]  Pediatric Urology Pager: [phone]

## 2024-11-25 ENCOUNTER — APPOINTMENT (OUTPATIENT)
Dept: RADIOLOGY | Facility: HOSPITAL | Age: 44
End: 2024-11-25
Payer: COMMERCIAL

## 2024-11-25 LAB
ALBUMIN SERPL BCP-MCNC: 4.2 G/DL (ref 3.4–5)
ANION GAP SERPL CALC-SCNC: 12 MMOL/L (ref 10–20)
APTT PPP: 30 SECONDS (ref 27–38)
BUN SERPL-MCNC: 21 MG/DL (ref 6–23)
CALCIUM SERPL-MCNC: 9.8 MG/DL (ref 8.6–10.6)
CHLORIDE SERPL-SCNC: 99 MMOL/L (ref 98–107)
CO2 SERPL-SCNC: 31 MMOL/L (ref 21–32)
CREAT SERPL-MCNC: 1.04 MG/DL (ref 0.5–1.3)
EGFRCR SERPLBLD CKD-EPI 2021: 90 ML/MIN/1.73M*2
ERYTHROCYTE [DISTWIDTH] IN BLOOD BY AUTOMATED COUNT: 12.8 % (ref 11.5–14.5)
GLUCOSE SERPL-MCNC: 100 MG/DL (ref 74–99)
HCT VFR BLD AUTO: 36.6 % (ref 41–52)
HGB BLD-MCNC: 12.5 G/DL (ref 13.5–17.5)
INR PPP: 1 (ref 0.9–1.1)
MCH RBC QN AUTO: 29.3 PG (ref 26–34)
MCHC RBC AUTO-ENTMCNC: 34.2 G/DL (ref 32–36)
MCV RBC AUTO: 86 FL (ref 80–100)
NRBC BLD-RTO: 0 /100 WBCS (ref 0–0)
PHOSPHATE SERPL-MCNC: 4.7 MG/DL (ref 2.5–4.9)
PLATELET # BLD AUTO: 294 X10*3/UL (ref 150–450)
POTASSIUM SERPL-SCNC: 3.7 MMOL/L (ref 3.5–5.3)
PROTHROMBIN TIME: 10.9 SECONDS (ref 9.8–12.8)
RBC # BLD AUTO: 4.27 X10*6/UL (ref 4.5–5.9)
SODIUM SERPL-SCNC: 138 MMOL/L (ref 136–145)
WBC # BLD AUTO: 7.4 X10*3/UL (ref 4.4–11.3)

## 2024-11-25 PROCEDURE — 1170000001 HC PRIVATE ONCOLOGY ROOM DAILY

## 2024-11-25 PROCEDURE — 2500000004 HC RX 250 GENERAL PHARMACY W/ HCPCS (ALT 636 FOR OP/ED): Performed by: STUDENT IN AN ORGANIZED HEALTH CARE EDUCATION/TRAINING PROGRAM

## 2024-11-25 PROCEDURE — 2500000001 HC RX 250 WO HCPCS SELF ADMINISTERED DRUGS (ALT 637 FOR MEDICARE OP)

## 2024-11-25 PROCEDURE — 7100000010 HC PHASE TWO TIME - EACH INCREMENTAL 1 MINUTE

## 2024-11-25 PROCEDURE — 49406 IMAGE CATH FLUID PERI/RETRO: CPT

## 2024-11-25 PROCEDURE — 99152 MOD SED SAME PHYS/QHP 5/>YRS: CPT

## 2024-11-25 PROCEDURE — 2500000004 HC RX 250 GENERAL PHARMACY W/ HCPCS (ALT 636 FOR OP/ED): Performed by: RADIOLOGY

## 2024-11-25 PROCEDURE — C1769 GUIDE WIRE: HCPCS

## 2024-11-25 PROCEDURE — 7100000009 HC PHASE TWO TIME - INITIAL BASE CHARGE

## 2024-11-25 PROCEDURE — 2500000005 HC RX 250 GENERAL PHARMACY W/O HCPCS

## 2024-11-25 PROCEDURE — 2720000007 HC OR 272 NO HCPCS

## 2024-11-25 PROCEDURE — 80069 RENAL FUNCTION PANEL: CPT

## 2024-11-25 PROCEDURE — C1894 INTRO/SHEATH, NON-LASER: HCPCS

## 2024-11-25 PROCEDURE — 85610 PROTHROMBIN TIME: CPT | Performed by: STUDENT IN AN ORGANIZED HEALTH CARE EDUCATION/TRAINING PROGRAM

## 2024-11-25 PROCEDURE — 0W9G30Z DRAINAGE OF PERITONEAL CAVITY WITH DRAINAGE DEVICE, PERCUTANEOUS APPROACH: ICD-10-PCS | Performed by: RADIOLOGY

## 2024-11-25 PROCEDURE — 99153 MOD SED SAME PHYS/QHP EA: CPT

## 2024-11-25 PROCEDURE — 85027 COMPLETE CBC AUTOMATED: CPT | Performed by: STUDENT IN AN ORGANIZED HEALTH CARE EDUCATION/TRAINING PROGRAM

## 2024-11-25 PROCEDURE — C1887 CATHETER, GUIDING: HCPCS

## 2024-11-25 PROCEDURE — C1729 CATH, DRAINAGE: HCPCS

## 2024-11-25 PROCEDURE — 2500000004 HC RX 250 GENERAL PHARMACY W/ HCPCS (ALT 636 FOR OP/ED)

## 2024-11-25 PROCEDURE — 2500000002 HC RX 250 W HCPCS SELF ADMINISTERED DRUGS (ALT 637 FOR MEDICARE OP, ALT 636 FOR OP/ED)

## 2024-11-25 PROCEDURE — 36415 COLL VENOUS BLD VENIPUNCTURE: CPT | Performed by: STUDENT IN AN ORGANIZED HEALTH CARE EDUCATION/TRAINING PROGRAM

## 2024-11-25 RX ORDER — MIDAZOLAM HYDROCHLORIDE 1 MG/ML
INJECTION INTRAMUSCULAR; INTRAVENOUS
Status: COMPLETED | OUTPATIENT
Start: 2024-11-25 | End: 2024-11-25

## 2024-11-25 RX ORDER — POTASSIUM CHLORIDE 1.5 G/1.58G
20 POWDER, FOR SOLUTION ORAL ONCE
Status: DISCONTINUED | OUTPATIENT
Start: 2024-11-25 | End: 2024-11-25

## 2024-11-25 RX ORDER — FENTANYL CITRATE 50 UG/ML
INJECTION, SOLUTION INTRAMUSCULAR; INTRAVENOUS
Status: COMPLETED | OUTPATIENT
Start: 2024-11-25 | End: 2024-11-25

## 2024-11-25 RX ORDER — CEFTRIAXONE 1 G/50ML
1 INJECTION, SOLUTION INTRAVENOUS EVERY 24 HOURS
Status: DISCONTINUED | OUTPATIENT
Start: 2024-11-25 | End: 2024-11-25

## 2024-11-25 RX ORDER — SULFAMETHOXAZOLE AND TRIMETHOPRIM 800; 160 MG/1; MG/1
1 TABLET ORAL EVERY 12 HOURS SCHEDULED
Status: DISCONTINUED | OUTPATIENT
Start: 2024-11-25 | End: 2024-11-27 | Stop reason: HOSPADM

## 2024-11-25 RX ORDER — POTASSIUM CHLORIDE 20 MEQ/1
20 TABLET, EXTENDED RELEASE ORAL ONCE
Status: COMPLETED | OUTPATIENT
Start: 2024-11-25 | End: 2024-11-25

## 2024-11-25 RX ADMIN — OXYCODONE 10 MG: 5 TABLET ORAL at 00:28

## 2024-11-25 RX ADMIN — OXYCODONE 10 MG: 5 TABLET ORAL at 04:38

## 2024-11-25 RX ADMIN — OXYCODONE 10 MG: 5 TABLET ORAL at 12:41

## 2024-11-25 RX ADMIN — ACETAMINOPHEN 975 MG: 325 TABLET ORAL at 21:03

## 2024-11-25 RX ADMIN — KETOROLAC TROMETHAMINE 15 MG: 15 INJECTION, SOLUTION INTRAMUSCULAR; INTRAVENOUS at 18:45

## 2024-11-25 RX ADMIN — SODIUM CHLORIDE 50 ML/HR: 9 INJECTION, SOLUTION INTRAVENOUS at 00:28

## 2024-11-25 RX ADMIN — KETOROLAC TROMETHAMINE 15 MG: 15 INJECTION, SOLUTION INTRAMUSCULAR; INTRAVENOUS at 00:28

## 2024-11-25 RX ADMIN — LIDOCAINE 1 PATCH: 4 PATCH TOPICAL at 16:57

## 2024-11-25 RX ADMIN — DULOXETINE HYDROCHLORIDE 90 MG: 30 CAPSULE, DELAYED RELEASE ORAL at 08:46

## 2024-11-25 RX ADMIN — FENTANYL CITRATE 100 MCG: 50 INJECTION, SOLUTION INTRAMUSCULAR; INTRAVENOUS at 13:13

## 2024-11-25 RX ADMIN — HYDROCHLOROTHIAZIDE 25 MG: 25 TABLET ORAL at 08:45

## 2024-11-25 RX ADMIN — TOPIRAMATE 25 MG: 25 TABLET, FILM COATED ORAL at 08:48

## 2024-11-25 RX ADMIN — FENTANYL CITRATE 50 MCG: 50 INJECTION, SOLUTION INTRAMUSCULAR; INTRAVENOUS at 13:29

## 2024-11-25 RX ADMIN — KETOROLAC TROMETHAMINE 15 MG: 15 INJECTION, SOLUTION INTRAMUSCULAR; INTRAVENOUS at 06:27

## 2024-11-25 RX ADMIN — POTASSIUM CHLORIDE 20 MEQ: 1500 TABLET, EXTENDED RELEASE ORAL at 18:45

## 2024-11-25 RX ADMIN — SULFAMETHOXAZOLE AND TRIMETHOPRIM 1 TABLET: 800; 160 TABLET ORAL at 21:06

## 2024-11-25 RX ADMIN — KETOROLAC TROMETHAMINE 15 MG: 15 INJECTION, SOLUTION INTRAMUSCULAR; INTRAVENOUS at 12:18

## 2024-11-25 RX ADMIN — OXYCODONE 10 MG: 5 TABLET ORAL at 08:46

## 2024-11-25 RX ADMIN — SENNOSIDES AND DOCUSATE SODIUM 2 TABLET: 50; 8.6 TABLET ORAL at 08:45

## 2024-11-25 RX ADMIN — ACETAMINOPHEN 975 MG: 325 TABLET ORAL at 08:46

## 2024-11-25 RX ADMIN — LOSARTAN POTASSIUM 100 MG: 50 TABLET, FILM COATED ORAL at 08:47

## 2024-11-25 RX ADMIN — MIDAZOLAM HYDROCHLORIDE 1 MG: 1 INJECTION, SOLUTION INTRAMUSCULAR; INTRAVENOUS at 13:13

## 2024-11-25 RX ADMIN — SODIUM CHLORIDE 50 ML/HR: 9 INJECTION, SOLUTION INTRAVENOUS at 08:53

## 2024-11-25 RX ADMIN — ACETAMINOPHEN 975 MG: 325 TABLET ORAL at 14:56

## 2024-11-25 RX ADMIN — METHOCARBAMOL TABLETS 750 MG: 500 TABLET, COATED ORAL at 12:16

## 2024-11-25 RX ADMIN — OXYCODONE 10 MG: 5 TABLET ORAL at 21:03

## 2024-11-25 RX ADMIN — MIDAZOLAM HYDROCHLORIDE 1 MG: 1 INJECTION, SOLUTION INTRAMUSCULAR; INTRAVENOUS at 13:29

## 2024-11-25 RX ADMIN — MIDAZOLAM HYDROCHLORIDE 1 MG: 1 INJECTION, SOLUTION INTRAMUSCULAR; INTRAVENOUS at 13:19

## 2024-11-25 RX ADMIN — SENNOSIDES AND DOCUSATE SODIUM 2 TABLET: 50; 8.6 TABLET ORAL at 21:02

## 2024-11-25 RX ADMIN — METHOCARBAMOL TABLETS 750 MG: 500 TABLET, COATED ORAL at 18:44

## 2024-11-25 RX ADMIN — ACETAMINOPHEN 975 MG: 325 TABLET ORAL at 04:38

## 2024-11-25 RX ADMIN — OXYCODONE 10 MG: 5 TABLET ORAL at 16:57

## 2024-11-25 ASSESSMENT — COGNITIVE AND FUNCTIONAL STATUS - GENERAL
DRESSING REGULAR UPPER BODY CLOTHING: A LITTLE
PERSONAL GROOMING: A LITTLE
MOVING TO AND FROM BED TO CHAIR: A LITTLE
TOILETING: A LITTLE
DRESSING REGULAR LOWER BODY CLOTHING: A LITTLE
TURNING FROM BACK TO SIDE WHILE IN FLAT BAD: A LITTLE
HELP NEEDED FOR BATHING: A LITTLE
STANDING UP FROM CHAIR USING ARMS: A LITTLE
WALKING IN HOSPITAL ROOM: A LITTLE

## 2024-11-25 ASSESSMENT — PAIN SCALES - GENERAL
PAINLEVEL_OUTOF10: 7

## 2024-11-25 ASSESSMENT — PAIN - FUNCTIONAL ASSESSMENT
PAIN_FUNCTIONAL_ASSESSMENT: 0-10

## 2024-11-25 NOTE — PROGRESS NOTES
AUGIE Shelton is a 44 year old male with a significant pmh of HTN, YI, migraines and depression who was diagnosed with 3.3cm Bosniak 4 and 2.8cm Bosniak 2F left renal lesions s/p left open partial nephrectomy with Dr. Shannon on 10/21. Findings were two masses removed separately with defects repaired primarily. Path was [facility] for the Bosniak 4 lesion and fibrotic nodule for the 2F lesion. Post-op course was uneventful, CRISTIAN creatinine was negative for urine leak and he was discharged 10/23. He returned to the ED 10/28 with c/o left flank pain, swelling around the incision site and continuous drainage from the CRISTIAN site. CT scan at the time showed a 16 x 3.9cm left perirenal fluid collection in communication with the abdominal wall c/f urine leak. He elected for outpatient drain placement with IR which he underwent on 10/31. He again returned to the ED 11/10 with continued symptoms, though CT showed decrease in size of the fluid collection to 6.6cm with pigtail drain within the collection. He followed up with Dr. Shannon in the office without resolution of pain, with subsequent plan for stent and nicole placement to facilitate drainage which were performed 11/18. Findings were extravasation of contrast from left upper pole calyx. He returned to the ED 11/21 with CT showing slight worsening of the collection. Drain was replaced by IR 11/22 with ~300cc output/day. UOP was good, nicole was removed and he passed a TOV. After nicole removal, patient c/o recurrent positional flank pain and swelling above the incision, but declined nicole replacement. He then underwent drain replacement with advancement into the subfascial space on 11/25. He is now recovering well on RNF.     Subjective Reports no left flank discomfort if sitting at a 45 degree angle, though pain above incision site worsens if at any other angles. Denies other symptoms including f/c/n/v.     Objective    Vital Signs:  Heart Rate:  []   Temp:  [35.8 °C  (96.4 °F)-36.6 °C (97.9 °F)]   Resp:  [16-18]   BP: (124-142)/(78-86)   SpO2:  [92 %-97 %]     I&Os    Intake/Output Summary (Last 24 hours) at 11/25/2024 1033  Last data filed at 11/25/2024 0833  Gross per 24 hour   Intake 510 ml   Output 248 ml   Net 262 ml        Labs  Results from last 72 hours   Lab Units 11/25/24  0633 11/24/24  0555 11/23/24  0648   CREATININE mg/dL 1.04 1.10 1.07   HEMOGLOBIN g/dL 12.5*  --   --    WBC AUTO x10*3/uL 7.4  --   --    GLUCOSE mg/dL 100* 93 92   POTASSIUM mmol/L 3.7 3.2* 3.4*        Physical Exam:   Constitutional: Alert, in NAD  HEENT: Normocephalic, atraumatic  Neuro: A&O x3  CV: Regular rate  Pulm: Non-laboured breathing   GI: Abdomen soft, non-distended, non-tender  : Left flank pain improved upon sitting at a 45 degree angle, worsens at 0 and 90 degree angle. CRISTIAN drain with urinous output. Rash around former skin tape sites, more consistent with dermatitis vs cellulitis. Voiding spontaneously  Skin: Left flank incision well healed. Rash noted as above.   Musculoskeletal: JOSETTE  Extremities: no edema or cyanosis noted    Current Medications:  acetaminophen, 975 mg, oral, q6h  DULoxetine, 90 mg, oral, Daily  hydroCHLOROthiazide, 25 mg, oral, Daily  ketorolac, 15 mg, intravenous, q6h SCAR  lidocaine, 1 patch, transdermal, q24h  losartan, 100 mg, oral, Daily  methocarbamol, 750 mg, oral, q6h [facility]  sennosides-docusate sodium, 2 tablet, oral, BID  topiramate, 25 mg, oral, Daily      PRN medications: bacitracin, hydrALAZINE, labetaloL, ondansetron **OR** ondansetron, oxybutynin, oxyCODONE, oxyCODONE, prochlorperazine **OR** prochlorperazine **OR** prochlorperazine   Dietary Orders (From admission, onward)       Start     Ordered    11/25/24 0001  NPO Diet Except: Sips with meds; Effective midnight  Diet effective midnight        Question:  Except:  Answer:  Sips with meds    11/24/24 1042    11/22/24 0347  May Participate in Room Service  ( ROOM SERVICE MAY PARTICIPATE)  Once         Question:  .  Answer:  Yes    11/22/24 6862                    Current Outpatient Medications   Medication Instructions    acetaminophen (TYLENOL) 975 mg, oral, Every 6 hours    bacitracin 500 unit/gram ointment Topical, 3 times daily, Apply to tip of penis for catheter discomfort as needed    DULoxetine (Cymbalta) 60 mg DR capsule 1 capsule, Daily (0630)    DULoxetine (CYMBALTA) 30 mg, Daily    ERGOCALCIFEROL, VITAMIN D2, ORAL 5,000 Units, Daily    hydroCHLOROthiazide (HYDRODIURIL) 25 mg, Daily    losartan (COZAAR) 100 mg, Daily    methocarbamol (ROBAXIN) 500 mg, oral, Every 6 hours scheduled    oxybutynin (DITROPAN) 5 mg, oral, 3 times daily PRN    oxyCODONE (ROXICODONE) 5 mg, oral, Every 6 hours PRN    oxyCODONE (ROXICODONE) 5 mg, oral, Every 6 hours PRN    oxyCODONE (ROXICODONE) 5 mg, oral, Every 6 hours PRN    oxyCODONE-acetaminophen (Percocet) 5-325 mg tablet 1 tablet, oral, Every 8 hours PRN    phenazopyridine (PYRIDIUM) 200 mg, oral, 3 times daily (morning, midday, late afternoon)    polyethylene glycol (GLYCOLAX, MIRALAX) 17 g, oral, Daily PRN    sildenafil (Viagra) 50 mg tablet 1 tablet, As needed    topiramate (TOPAMAX) 25 mg, Daily        Updates:  11/23: Nicole removed and passed TOV, however with increased flank discomfort and fullness after nicole removal and standing for first time since drain replacement.    11/24: continues to have positional L flank swelling and discomfort    11/25: Afebrile, VSS. sCr wnl. Drain output appears urinous, 328<362mL. Symptoms/exam unchanged    Plan:     Neuro:  #hx of migraines, depression  #home meds: duloxetine, topiramate  - Scheduled tylenol 975 mg q6h, toradol 15mg q6hr, robaxin 750mg q6hr  - PRN oxycodone 5/10mg q4-6hr for mod/severe pain   - Lidocaine patches  - Zofran 4 mg q8h PRN for nausea  - Continue home duloxetine, topiramate    CV:   #hx of HTN, HTN episodes resolved after restarting home medications   #home meds: losartan, hydrochlorothiazide  - VS  q8hr  - Continue home losartan, hydrochlorothiazide    Heme:   #Hgb 12.5<13.7  - No indication for transfusion at this time  - Daily CBC     Resp:  #hx of YI  - Aggressive pulmonary toilet and incentive spirometry 10x/hour    GI/Diet:   - Resume regular diet post IR procedure  - Bowel regimen (senna-docusate)    /Renal:   #sCr wnl  - Drain replacement/advancement with IR today 11/25, maintain both CRISTIAN drains to gravity  - Strict I/Os  - Daily BMP   - IVF at 50 cc/hr while NPO for procedure, HLIVF after procedure    Endocrine:   - No current needs    MSK:  - Ambulate as tolerated    ID:   - Monitor for signs/sx of infection    PPx: SCDs     Dispo: RNF, anticipate discharge tomorrow with drains and drain diary if symptoms improve    Seen and discussed with chief resident Dr. Chang. To be discussed with attending physician Dr. Shannon.    ---  Alicia Hanna PA-C  Service Pager [phone]

## 2024-11-25 NOTE — CARE PLAN
The patient's goals for the shift include      The clinical goals for the shift include pt will remain safe        Problem: Pain  Goal: Performs ADL's with improved pain control throughout shift  Outcome: Progressing     Problem: Pain  Goal: Free from acute confusion related to pain meds throughout the shift  Outcome: Progressing     Problem: Pain  Goal: Takes deep breaths with improved pain control throughout the shift  Outcome: Progressing

## 2024-11-25 NOTE — PRE-PROCEDURE NOTE
Interventional Radiology Preprocedure Note    Indication for procedure: The primary encounter diagnosis was Urine leak in renal parenchyma after kidney transplant ([facility]). Diagnoses of Pain due to ureteral stent, initial encounter (CMS-HCC), Postoperative leak, and Hypokalemia were also pertinent to this visit.    Relevant review of systems: NA    Relevant Labs:   Lab Results   Component Value Date    CREATININE 1.04 11/25/2024    EGFR 90 11/25/2024    INR 1.0 11/25/2024    PROTIME 10.9 11/25/2024       Planned Sedation/Anesthesia: Moderate    Airway assessment: normal    Directed physical examination:    NAD  A&Ox3    Mallampati: II (hard and soft palate, upper portion of tonsils and uvula visible)    ASA Score: ASA 3 - Patient with moderate systemic disease with functional limitations    Benefits, risks and alternatives of procedure and planned sedation have been discussed with the patient and/or their representative. All questions answered and they agree to proceed.

## 2024-11-25 NOTE — CARE PLAN
Problem: Pain  Goal: Takes deep breaths with improved pain control throughout the shift  Outcome: Progressing  Goal: Turns in bed with improved pain control throughout the shift  Outcome: Progressing  Goal: Walks with improved pain control throughout the shift  Outcome: Progressing  Goal: Performs ADL's with improved pain control throughout shift  Outcome: Progressing  Goal: Participates in PT with improved pain control throughout the shift  Outcome: Progressing  Goal: Free from opioid side effects throughout the shift  Outcome: Progressing  Goal: Free from acute confusion related to pain meds throughout the shift  Outcome: Progressing     Problem: Skin  Goal: Decreased wound size/increased tissue granulation at next dressing change  Outcome: Progressing  Goal: Participates in plan/prevention/treatment measures  Outcome: Progressing  Goal: Prevent/manage excess moisture  Outcome: Progressing  Goal: Prevent/minimize sheer/friction injuries  Outcome: Progressing  Goal: Promote/optimize nutrition  Outcome: Progressing  Goal: Promote skin healing  Outcome: Progressing   The patient's goals for the shift include  pain manage.    The clinical goals for the shift include pt will remain safe    Over the shift, the patient did not make progress toward the following goals. Barriers to progression include pain. Recommendations to address these barriers include prn meds.

## 2024-11-25 NOTE — POST-PROCEDURE NOTE
Interventional Radiology Brief Postprocedure Note    Attending: Dr. Cortez    Assistant: Dr. Moreland    Diagnosis: Perinephric Fluid Collection    Description of procedure: Patient was brought to the procedure area.  Limited diagnostic scanning of the abdomen was performed, which demonstrated a small left sided perinephric fluid collection tracking into the adjacent left flank abdominal wall soft tissues through an abdominal wall muscular defect. Note was made of a previously placed pigtail drainage catheter within the left posterolateral wall soft tissues. Subsequently the patient was prepped and draped in the usual sterile manner.  Lidocaine was administered for local analgesia.  Subsequently with a Artsicle catheter, the fluid collection was accessed under CT and fluoroscopic guidance.  The inner stylet was removed and a Ralph wire was advanced into the fluid collection. Multiple dilators were subsequently used. A peel away sheath was then advanced. Subsequently, a 10F drain was placed in the collection and attached to suction. See PACS for full details.    If abscess drain is not draining correctly or there are questions regarding care and management of this drain while patient is inpatient status, please call [phone] for IR nursing to triage.       Anesthesia:  Local    Complications: None    Estimated Blood Loss: minimal    Medications  As of 11/25/24 1407      oxyCODONE (Roxicodone) immediate release tablet 5 mg (mg) Total dose:  5 mg Dosing weight:  108      Date/Time Rate/Dose/Volume Action       11/22/24  0808 5 mg Given               oxyCODONE (Roxicodone) immediate release tablet 10 mg (mg) Total dose:  170 mg* Dosing weight:  108   *Administration not included in total     Date/Time Rate/Dose/Volume Action       11/21/24  2114 10 mg Given     11/22/24  0017 *10 mg Missed      0333 10 mg Given      1346 10 mg Given      2022 10 mg Given     11/23/24  0520 10 mg Given      1336 10 mg Given      1807 10 mg  Given      2228 10 mg Given     11/24/24  0236 10 mg Given      0648 10 mg Given      1044 10 mg Given      1522 10 mg Given      2025 10 mg Given     11/25/24  0028 10 mg Given      0438 10 mg Given      0846 10 mg Given      1241 10 mg Given               sennosides-docusate sodium (Stacia-Colace) 8.6-50 mg per tablet 2 tablet (tablet) Total dose:  16 tablet Dosing weight:  108      Date/Time Rate/Dose/Volume Action       11/22/24  0016 2 tablet Given      0809 2 tablet Given      2021 2 tablet Given     11/23/24  0837 2 tablet Given      2129 2 tablet Given     11/24/24  0850 2 tablet Given      2025 2 tablet Given     11/25/24  0845 2 tablet Given               ondansetron (Zofran) tablet 4 mg (mg) Total dose:  4 mg Dosing weight:  108      Date/Time Rate/Dose/Volume Action       11/24/24  1522 4 mg Given               ondansetron (Zofran) injection 4 mg (mg) Total dose:  Cannot be calculated* Dosing weight:  108   *Administration dose not documented     Date/Time Rate/Dose/Volume Action       11/24/24  1522 *Not included in total See Alternative               acetaminophen (Tylenol) tablet 975 mg (mg) Total dose:  14,625 mg Dosing weight:  108      Date/Time Rate/Dose/Volume Action       11/21/24  2215 975 mg Given     11/22/24  0332 975 mg Given      1006 975 mg Given      1639 975 mg Given      2241 975 mg Given     11/23/24  0356 975 mg Given      0840 975 mg Given      1545 975 mg Given      2129 975 mg Given     11/24/24  0236 975 mg Given      0850 975 mg Given      1522 975 mg Given      2025 975 mg Given     11/25/24  0438 975 mg Given      0846 975 mg Given               DULoxetine (Cymbalta) DR capsule 30 mg (mg) Total dose:  60 mg Dosing weight:  108      Date/Time Rate/Dose/Volume Action       11/22/24  0809 30 mg Given     11/23/24  0837 30 mg Given               DULoxetine (Cymbalta) DR capsule 90 mg (mg) Total dose:  180 mg Dosing weight:  108      Date/Time Rate/Dose/Volume Action       11/24/24   0849 90 mg Given     11/25/24  0846 90 mg Given               methocarbamol (Robaxin) tablet 500 mg (mg) Total dose:  3,000 mg* Dosing weight:  108   *Administration not included in total     Date/Time Rate/Dose/Volume Action       11/22/24  0017 500 mg Given      0509 500 mg Given      1347 500 mg Given      1754 500 mg Given      2303 500 mg Given     11/23/24  0516 *500 mg Missed      1115 500 mg Given               methocarbamol (Robaxin) tablet 750 mg (mg) Total dose:  1,500 mg* Dosing weight:  108   *Administration not included in total     Date/Time Rate/Dose/Volume Action       11/23/24  1807 *750 mg Missed     11/24/24  0236 750 mg Given      0747 *750 mg Missed      1206 *750 mg Missed      1706 *750 mg Missed     11/25/24  0024 *750 mg Missed      0619 *750 mg Missed      1216 750 mg Given               oxybutynin (Ditropan) tablet 5 mg (mg) Total dose:  5 mg Dosing weight:  108      Date/Time Rate/Dose/Volume Action       11/22/24  0438 5 mg Given               phenazopyridine (Pyridium) tablet 200 mg (mg) Total dose:  1,200 mg Dosing weight:  108      Date/Time Rate/Dose/Volume Action       11/22/24  0809 200 mg Given      1347 200 mg Given      1640 200 mg Given     11/23/24  0837 200 mg Given      1115 200 mg Given      1630 200 mg Given               topiramate (Topamax) tablet 25 mg (mg) Total dose:  100 mg Dosing weight:  108      Date/Time Rate/Dose/Volume Action       11/22/24  0810 25 mg Given     11/23/24  0838 25 mg Given     11/24/24  0849 25 mg Given     11/25/24  0848 25 mg Given               potassium chloride 20 mEq in sterile water for injection 100 mL (mEq) Total dose:  0 mEq* Dosing weight:  108   *Administration not included in total     Date/Time Rate/Dose/Volume Action       11/22/24  0014 *20 mEq - 50 mL/hr (over 120 min) Missed      0021 *20 mEq - 50 mL/hr (over 120 min) Missed               potassium chloride 20 mEq in sterile water for injection 100 mL (mL/hr) Total volume:   Not documented* Dosing weight:  108   *Total volume has not been documented. View each administration to see the amount administered.     Date/Time Rate/Dose/Volume Action       11/21/24  2125 20 mEq - 50 mL/hr (over 120 min) New Bag     11/22/24  0014  (over 120 min) Stopped      0015 *20 mEq - 50 mL/hr (over 120 min) Missed      0956 20 mEq - 50 mL/hr (over 120 min) New Bag      1341  (over 120 min) Stopped      1347 20 mEq - 50 mL/hr (over 120 min) New Bag      1900  (over 120 min) Stopped               potassium chloride 20 mEq in sterile water for injection 100 mL (mL/hr) Total dose:  20 mEq* Dosing weight:  108   *From user-documented volume     Date/Time Rate/Dose/Volume Action       11/22/24  0022 20 mEq - 50 mL/hr (over 120 min) New Bag      0152 *20 mEq - 50 mL/hr (over 120 min) Missed      0216 100 mL Stopped               potassium chloride (Klor-Con) packet 20 mEq (mEq) Total dose:  20 mEq Dosing weight:  108      Date/Time Rate/Dose/Volume Action       11/21/24  2218 20 mEq Given               potassium chloride CR (Klor-Con M20) ER tablet 40 mEq (mEq) Total dose:  120 mEq Dosing weight:  108      Date/Time Rate/Dose/Volume Action       11/21/24  2215 40 mEq Given     11/23/24  1109 40 mEq Given     11/24/24  1044 40 mEq Given               fentaNYL PF (Sublimaze) injection (mcg) Total dose:  250 mcg      Date/Time Rate/Dose/Volume Action       11/22/24  1244 50 mcg Given      1249 50 mcg Given     11/25/24  1313 100 mcg Given      1329 50 mcg Given               midazolam (Versed) injection (mg) Total dose:  5 mg      Date/Time Rate/Dose/Volume Action       11/22/24  1244 1 mg Given      1249 1 mg Given     11/25/24  1313 1 mg Given      1319 1 mg Given      1329 1 mg Given               hydroCHLOROthiazide (HYDRODiuril) tablet 25 mg (mg) Total dose:  75 mg Dosing weight:  108      Date/Time Rate/Dose/Volume Action       11/23/24  1630 25 mg Given     11/24/24  0850 25 mg Given     11/25/24  0845 25  mg Given               lidocaine 4 % patch 1 patch (patch) Total dose:  1 patch* Dosing weight:  108   *Administration not included in total     Date/Time Rate/Dose/Volume Action       11/23/24  1808 1 patch (over 720 min) Medication Applied     11/24/24  0608  (over 720 min) Medication Removed      1615 *1 patch (over 720 min) Missed               losartan (Cozaar) tablet 100 mg (mg) Total dose:  200 mg Dosing weight:  108      Date/Time Rate/Dose/Volume Action       11/24/24  0850 100 mg Given     11/25/24  0847 100 mg Given               sodium chloride 0.9% infusion (mL/hr) Total volume:  150 mL* Dosing weight:  108   *From user-documented volume     Date/Time Rate/Dose/Volume Action       11/24/24  1600 150 mL      11/25/24  0028 50 mL/hr New Bag      0853 50 mL/hr New Bag               ketorolac (Toradol) injection 15 mg (mg) Total dose:  60 mg Dosing weight:  108      Date/Time Rate/Dose/Volume Action       11/24/24  1652 15 mg Given     11/25/24  0028 15 mg Given      0627 15 mg Given      1218 15 mg Given                       See detailed result report with images in PACS.    The patient tolerated the procedure well without incident or complication and is in stable condition.

## 2024-11-26 LAB
ALBUMIN SERPL BCP-MCNC: 4.1 G/DL (ref 3.4–5)
ANION GAP SERPL CALC-SCNC: 14 MMOL/L (ref 10–20)
BUN SERPL-MCNC: 16 MG/DL (ref 6–23)
CALCIUM SERPL-MCNC: 9.4 MG/DL (ref 8.6–10.6)
CHLORIDE SERPL-SCNC: 96 MMOL/L (ref 98–107)
CO2 SERPL-SCNC: 27 MMOL/L (ref 21–32)
CREAT SERPL-MCNC: 1.06 MG/DL (ref 0.5–1.3)
EGFRCR SERPLBLD CKD-EPI 2021: 88 ML/MIN/1.73M*2
ERYTHROCYTE [DISTWIDTH] IN BLOOD BY AUTOMATED COUNT: 12.9 % (ref 11.5–14.5)
GLUCOSE SERPL-MCNC: 93 MG/DL (ref 74–99)
HCT VFR BLD AUTO: 35 % (ref 41–52)
HGB BLD-MCNC: 11.8 G/DL (ref 13.5–17.5)
MCH RBC QN AUTO: 28.4 PG (ref 26–34)
MCHC RBC AUTO-ENTMCNC: 33.7 G/DL (ref 32–36)
MCV RBC AUTO: 84 FL (ref 80–100)
NRBC BLD-RTO: 0 /100 WBCS (ref 0–0)
PHOSPHATE SERPL-MCNC: 4 MG/DL (ref 2.5–4.9)
PLATELET # BLD AUTO: 283 X10*3/UL (ref 150–450)
POTASSIUM SERPL-SCNC: 3.5 MMOL/L (ref 3.5–5.3)
RBC # BLD AUTO: 4.16 X10*6/UL (ref 4.5–5.9)
SODIUM SERPL-SCNC: 133 MMOL/L (ref 136–145)
WBC # BLD AUTO: 9.2 X10*3/UL (ref 4.4–11.3)

## 2024-11-26 PROCEDURE — 2500000001 HC RX 250 WO HCPCS SELF ADMINISTERED DRUGS (ALT 637 FOR MEDICARE OP)

## 2024-11-26 PROCEDURE — 85027 COMPLETE CBC AUTOMATED: CPT | Performed by: STUDENT IN AN ORGANIZED HEALTH CARE EDUCATION/TRAINING PROGRAM

## 2024-11-26 PROCEDURE — 80069 RENAL FUNCTION PANEL: CPT

## 2024-11-26 PROCEDURE — 99232 SBSQ HOSP IP/OBS MODERATE 35: CPT | Performed by: PHYSICIAN ASSISTANT

## 2024-11-26 PROCEDURE — 36415 COLL VENOUS BLD VENIPUNCTURE: CPT | Performed by: STUDENT IN AN ORGANIZED HEALTH CARE EDUCATION/TRAINING PROGRAM

## 2024-11-26 PROCEDURE — 1170000001 HC PRIVATE ONCOLOGY ROOM DAILY

## 2024-11-26 PROCEDURE — 2500000004 HC RX 250 GENERAL PHARMACY W/ HCPCS (ALT 636 FOR OP/ED)

## 2024-11-26 PROCEDURE — 2500000002 HC RX 250 W HCPCS SELF ADMINISTERED DRUGS (ALT 637 FOR MEDICARE OP, ALT 636 FOR OP/ED): Performed by: STUDENT IN AN ORGANIZED HEALTH CARE EDUCATION/TRAINING PROGRAM

## 2024-11-26 PROCEDURE — 2500000002 HC RX 250 W HCPCS SELF ADMINISTERED DRUGS (ALT 637 FOR MEDICARE OP, ALT 636 FOR OP/ED)

## 2024-11-26 RX ORDER — POTASSIUM CHLORIDE 20 MEQ/1
40 TABLET, EXTENDED RELEASE ORAL ONCE
Status: COMPLETED | OUTPATIENT
Start: 2024-11-26 | End: 2024-11-26

## 2024-11-26 RX ADMIN — TOPIRAMATE 25 MG: 25 TABLET, FILM COATED ORAL at 09:05

## 2024-11-26 RX ADMIN — OXYCODONE 10 MG: 5 TABLET ORAL at 01:07

## 2024-11-26 RX ADMIN — OXYCODONE 10 MG: 5 TABLET ORAL at 20:26

## 2024-11-26 RX ADMIN — KETOROLAC TROMETHAMINE 15 MG: 15 INJECTION, SOLUTION INTRAMUSCULAR; INTRAVENOUS at 08:18

## 2024-11-26 RX ADMIN — LOSARTAN POTASSIUM 100 MG: 50 TABLET, FILM COATED ORAL at 09:05

## 2024-11-26 RX ADMIN — SULFAMETHOXAZOLE AND TRIMETHOPRIM 1 TABLET: 800; 160 TABLET ORAL at 09:05

## 2024-11-26 RX ADMIN — ACETAMINOPHEN 975 MG: 325 TABLET ORAL at 18:03

## 2024-11-26 RX ADMIN — HYDROCHLOROTHIAZIDE 25 MG: 25 TABLET ORAL at 09:05

## 2024-11-26 RX ADMIN — OXYCODONE 10 MG: 5 TABLET ORAL at 05:43

## 2024-11-26 RX ADMIN — ACETAMINOPHEN 975 MG: 325 TABLET ORAL at 05:43

## 2024-11-26 RX ADMIN — OXYCODONE 10 MG: 5 TABLET ORAL at 16:33

## 2024-11-26 RX ADMIN — DULOXETINE HYDROCHLORIDE 90 MG: 30 CAPSULE, DELAYED RELEASE ORAL at 09:05

## 2024-11-26 RX ADMIN — OXYCODONE 10 MG: 5 TABLET ORAL at 13:00

## 2024-11-26 RX ADMIN — SULFAMETHOXAZOLE AND TRIMETHOPRIM 1 TABLET: 800; 160 TABLET ORAL at 20:26

## 2024-11-26 RX ADMIN — OXYCODONE 10 MG: 5 TABLET ORAL at 09:06

## 2024-11-26 RX ADMIN — POTASSIUM CHLORIDE 40 MEQ: 1500 TABLET, EXTENDED RELEASE ORAL at 16:36

## 2024-11-26 RX ADMIN — SENNOSIDES AND DOCUSATE SODIUM 2 TABLET: 50; 8.6 TABLET ORAL at 20:27

## 2024-11-26 RX ADMIN — SENNOSIDES AND DOCUSATE SODIUM 2 TABLET: 50; 8.6 TABLET ORAL at 09:05

## 2024-11-26 RX ADMIN — KETOROLAC TROMETHAMINE 15 MG: 15 INJECTION, SOLUTION INTRAMUSCULAR; INTRAVENOUS at 01:06

## 2024-11-26 RX ADMIN — ACETAMINOPHEN 975 MG: 325 TABLET ORAL at 12:15

## 2024-11-26 ASSESSMENT — PAIN SCALES - GENERAL
PAINLEVEL_OUTOF10: 7

## 2024-11-26 ASSESSMENT — PAIN - FUNCTIONAL ASSESSMENT
PAIN_FUNCTIONAL_ASSESSMENT: 0-10
PAIN_FUNCTIONAL_ASSESSMENT: 0-10

## 2024-11-26 NOTE — CARE PLAN
The patient's goals for the shift include      The clinical goals for the shift include Patient josselyn remain safe      Problem: Pain  Goal: Takes deep breaths with improved pain control throughout the shift  Outcome: Progressing  Goal: Turns in bed with improved pain control throughout the shift  Outcome: Progressing  Goal: Walks with improved pain control throughout the shift  Outcome: Progressing  Goal: Performs ADL's with improved pain control throughout shift  Outcome: Progressing  Goal: Participates in PT with improved pain control throughout the shift  Outcome: Progressing  Goal: Free from opioid side effects throughout the shift  Outcome: Progressing  Goal: Free from acute confusion related to pain meds throughout the shift  Outcome: Progressing     Problem: Skin  Goal: Decreased wound size/increased tissue granulation at next dressing change  Outcome: Progressing  Flowsheets (Taken 11/26/2024 1338)  Decreased wound size/increased tissue granulation at next dressing change: Promote sleep for wound healing  Goal: Participates in plan/prevention/treatment measures  Outcome: Progressing  Flowsheets (Taken 11/26/2024 1338)  Participates in plan/prevention/treatment measures: Discuss with provider PT/OT consult  Goal: Prevent/manage excess moisture  Outcome: Progressing  Flowsheets (Taken 11/26/2024 1338)  Prevent/manage excess moisture:   Cleanse incontinence/protect with barrier cream   Monitor for/manage infection if present  Goal: Prevent/minimize sheer/friction injuries  Outcome: Progressing  Flowsheets (Taken 11/26/2024 1338)  Prevent/minimize sheer/friction injuries:   Use pull sheet   Increase activity/out of bed for meals  Goal: Promote/optimize nutrition  Outcome: Progressing  Flowsheets (Taken 11/26/2024 1338)  Promote/optimize nutrition: Monitor/record intake including meals  Goal: Promote skin healing  Outcome: Progressing  Flowsheets (Taken 11/26/2024 1338)  Promote skin healing:   Assess skin/pad  under line(s)/device(s)   Turn/reposition every 2 hours/use positioning/transfer devices

## 2024-11-26 NOTE — CARE PLAN
Problem: Pain  Goal: Takes deep breaths with improved pain control throughout the shift  Outcome: Progressing  Goal: Turns in bed with improved pain control throughout the shift  Outcome: Progressing  Goal: Walks with improved pain control throughout the shift  Outcome: Progressing  Goal: Performs ADL's with improved pain control throughout shift  Outcome: Progressing  Goal: Participates in PT with improved pain control throughout the shift  Outcome: Progressing  Goal: Free from opioid side effects throughout the shift  Outcome: Progressing  Goal: Free from acute confusion related to pain meds throughout the shift  Outcome: Progressing     Problem: Skin  Goal: Decreased wound size/increased tissue granulation at next dressing change  Outcome: Progressing  Goal: Participates in plan/prevention/treatment measures  Outcome: Progressing  Goal: Prevent/manage excess moisture  Outcome: Progressing  Goal: Prevent/minimize sheer/friction injuries  Outcome: Progressing  Goal: Promote/optimize nutrition  Outcome: Progressing  Goal: Promote skin healing  Outcome: Progressing   The patient's goals for the shift include  pain manage.    The clinical goals for the shift include Pt will have gradually improve pain management    Over the shift, the patient did not make progress toward the following goals. Barriers to progression include pain. Recommendations to address these barriers include prn meds.

## 2024-11-26 NOTE — PROGRESS NOTES
AUGIE Shelton is a 44 year old male with a significant pmh of HTN, YI, migraines and depression who was diagnosed with 3.3cm Bosniak 4 and 2.8cm Bosniak 2F left renal lesions s/p left open partial nephrectomy with Dr. Shannon on 10/21. Findings were two masses removed separately with defects repaired primarily. Path was [facility] for the Bosniak 4 lesion and fibrotic nodule for the 2F lesion. Post-op course was uneventful, CRISTIAN creatinine was negative for urine leak and he was discharged 10/23. He returned to the ED 10/28 with c/o left flank pain, swelling around the incision site and continuous drainage from the CRISTIAN site. CT scan at the time showed a 16 x 3.9cm left perirenal fluid collection in communication with the abdominal wall c/f urine leak. He elected for outpatient drain placement with IR which he underwent on 10/31. He again returned to the ED 11/10 with continued symptoms, though CT showed decrease in size of the fluid collection to 6.6cm with pigtail drain within the collection. He followed up with Dr. Shannon in the office without resolution of pain, with subsequent plan for stent and nicole placement to facilitate drainage which were performed 11/18. Findings were extravasation of contrast from left upper pole calyx. He returned to the ED 11/21 with CT showing slight worsening of the collection. Drain was replaced by IR 11/22 with ~300cc output/day. UOP was good, nicole was removed and he passed a TOV. After nicole removal, patient c/o recurrent positional flank pain and swelling above the incision, but declined nicole replacement. He then underwent drain replacement with advancement into the subfascial space on 11/25. He is now recovering well on RNF.     Subjective   -NAOE, Tachycardic overnight, unable to sleep due to CPAP being too loud. Otherwise feeling well.  -Mild pain at incision site   -IR drain placed yesterday with minimal SS output   -Denies other symptoms including f/c/n/v.   -Continues to  endorse headache that has been consistent since admission     Objective    Vital Signs:  Heart Rate:  []   Temp:  [36.2 °C (97.2 °F)-37.2 °C (98.9 °F)]   Resp:  [10-17]   BP: ()/(53-86)   SpO2:  [93 %-96 %]     I&Os    Intake/Output Summary (Last 24 hours) at 11/26/2024 0820  Last data filed at 11/26/2024 0546  Gross per 24 hour   Intake 0 ml   Output 827695 ml   Net -079381 ml        Labs  Results from last 72 hours   Lab Units 11/26/24  0657 11/25/24  0633 11/24/24  0555   CREATININE mg/dL 1.06 1.04 1.10   HEMOGLOBIN g/dL 11.8* 12.5*  --    WBC AUTO x10*3/uL 9.2 7.4  --    GLUCOSE mg/dL 93 100* 93   POTASSIUM mmol/L 3.5 3.7 3.2*        Physical Exam:   Constitutional: Alert, in NAD  HEENT: Normocephalic, atraumatic  Neuro: A&O x3  CV: tachycardic, normotensive   Pulm: Non-laboured breathing on room air  GI: Abdomen soft, non-distended, non-tender  : Mild Left flank incisional pain. CRISTIAN drain with urinous output. New IR drain with minimal SS output. Rash around former skin tape sites, more consistent with dermatitis vs cellulitis. Voiding spontaneously  Skin: Left flank incision well healed. Rash noted as above.   Musculoskeletal: JOSETTE  Extremities: no edema or cyanosis noted    Current Medications:  acetaminophen, 975 mg, oral, q6h  DULoxetine, 90 mg, oral, Daily  hydroCHLOROthiazide, 25 mg, oral, Daily  lidocaine, 1 patch, transdermal, q24h  losartan, 100 mg, oral, Daily  methocarbamol, 750 mg, oral, q6h SCAR  sennosides-docusate sodium, 2 tablet, oral, BID  sulfamethoxazole-trimethoprim, 1 tablet, oral, q12h SCAR  topiramate, 25 mg, oral, Daily      PRN medications: ondansetron **OR** ondansetron, oxyCODONE, oxyCODONE   Dietary Orders (From admission, onward)       Start     Ordered    11/25/24 1428  Adult diet Regular  Diet effective now        Question:  Diet type  Answer:  Regular    11/25/24 1427    11/22/24 0347  May Participate in Room Service  ( ROOM SERVICE MAY PARTICIPATE)  Once         Question:  .  Answer:  Yes    11/22/24 9478                    Current Outpatient Medications   Medication Instructions    acetaminophen (TYLENOL) 975 mg, oral, Every 6 hours    bacitracin 500 unit/gram ointment Topical, 3 times daily, Apply to tip of penis for catheter discomfort as needed    DULoxetine (Cymbalta) 60 mg DR capsule 1 capsule, Daily (0630)    DULoxetine (CYMBALTA) 30 mg, Daily    ERGOCALCIFEROL, VITAMIN D2, ORAL 5,000 Units, Daily    hydroCHLOROthiazide (HYDRODIURIL) 25 mg, Daily    losartan (COZAAR) 100 mg, Daily    methocarbamol (ROBAXIN) 500 mg, oral, Every 6 hours scheduled    oxybutynin (DITROPAN) 5 mg, oral, 3 times daily PRN    oxyCODONE (ROXICODONE) 5 mg, oral, Every 6 hours PRN    oxyCODONE (ROXICODONE) 5 mg, oral, Every 6 hours PRN    oxyCODONE-acetaminophen (Percocet) 5-325 mg tablet 1 tablet, oral, Every 8 hours PRN    phenazopyridine (PYRIDIUM) 200 mg, oral, 3 times daily (morning, midday, late afternoon)    polyethylene glycol (GLYCOLAX, MIRALAX) 17 g, oral, Daily PRN    sildenafil (Viagra) 50 mg tablet 1 tablet, As needed    topiramate (TOPAMAX) 25 mg, Daily        Updates:  11/23: Nicole removed and passed TOV, however with increased flank discomfort and fullness after nicole removal and standing for first time since drain replacement.    11/24: continues to have positional L flank swelling and discomfort    11/25: Afebrile, VSS. sCr wnl. Drain output appears urinous, 328<362mL. Symptoms/exam unchanged    11/26: afebrile. Tachycardia, Drain output urinous 140 cc. New IR drain placed yesterday with minimal SS output. Endorses headache. Symptoms/exam unchanged     Plan 11/26:  -Discuss with IR to make sure drain is in correct position   -Continue bactrim   -Anticipate discharge today vs tomorrow     Plan:     Neuro:  #hx of migraines, depression  #home meds: duloxetine, topiramate  - Scheduled tylenol 975 mg q6h, toradol 15mg q6hr, robaxin 750mg q6hr  - PRN oxycodone 5/10mg q4-6hr for  mod/severe pain   - Lidocaine patches  - Zofran 4 mg q8h PRN for nausea  - Continue home duloxetine, topiramate    CV:   #hx of HTN, HTN episodes resolved after restarting home medications   #home meds: losartan, hydrochlorothiazide  - VS q8hr  - Continue home losartan, hydrochlorothiazide    Heme:   #Hgb 12.5<13.7  - No indication for transfusion at this time  - Daily CBC     Resp:  #hx of YI  - Aggressive pulmonary toilet and incentive spirometry 10x/hour  - CPAP    GI/Diet:   - Regular diet   - Bowel regimen (senna-docusate)    /Renal:   #sCr wnl  - Drain replacement/advancement with IR 11/25, maintain both CRISTIAN drains to gravity  - Strict I/Os  - Daily BMP     Endocrine:   - No current needs    MSK:  - Ambulate as tolerated    ID:   - Monitor for signs/sx of infection  -Continue bactrim     PPx: SCDs     Dispo: RNF, anticipate discharge tomorrow with today vs tomorrow and drain diary if symptoms improve    Seen and discussed with chief resident Dr. Chang. Discussed with attending physician Dr. Shannon.    ---  Levon Vega MD, RTA   Service Pager [phone]

## 2024-11-26 NOTE — PROGRESS NOTES
"Skyler Shelton is a 44 y.o. male on day 4 of admission presenting with Urine leak in renal parenchyma after kidney transplant ([facility]).    Subjective   IR asked to assess drain for fluid collection which was placed 11/25/24.  Per primary team, nursing with concerns for possible displacement.  Per patient, he cannot recall any event when drain could have been displaced, he does not have any current concerns.       Objective     Physical Exam  Constitutional:       General: He is not in acute distress.  HENT:      Mouth/Throat:      Mouth: Mucous membranes are moist.   Eyes:      Conjunctiva/sclera: Conjunctivae normal.   Pulmonary:      Effort: Pulmonary effort is normal. No respiratory distress.   Abdominal:      General: There is no distension.      Palpations: Abdomen is soft.      Comments: inferior drain with clear yellow output to bulb drain; changed dressing  superior drain with serosanguinous output to bulb drain, suture attached to skin appear loose; changed dressing   Musculoskeletal:         General: Normal range of motion.   Skin:     General: Skin is warm and dry.      Findings: Rash present.      Comments: Diffuse rash on abdomen and flank  Erythematous and indurated skin on flank surrounding previous surgical scar   Neurological:      General: No focal deficit present.      Mental Status: He is alert and oriented to person, place, and time. Mental status is at baseline.   Psychiatric:         Mood and Affect: Mood normal.         Behavior: Behavior normal.         Last Recorded Vitals  Blood pressure 113/76, pulse 97, temperature 36.7 °C (98.1 °F), temperature source Temporal, resp. rate 17, height 1.727 m (5' 8\"), weight 108 kg (238 lb), SpO2 95%.  Intake/Output last 3 Shifts:  I/O last 3 completed shifts:  In: 0 (0 mL/kg)   Out: 028544 (3107.3 mL/kg) [Drains:849023]  Weight: 108 kg     Relevant Results                              Assessment/Plan   Assessment & Plan    Patient with two recent drains " placed by IR: an inferior drain placed 11/22/24 and a superior drain placed 11/25/24.  Primary team asked for IR to evaluate superior drain after nursing raised concerns for possible displacement.  On evaluation at bedside, superior drain with loose sutures attaching drain to skin.  Per Dr. Cortez, these sutures were intentionally left loose.  Ok to proceed with anticipated plans for discharge.    I personally spent 35 minutes on this consult with over 50% of time spent discussing management and care of specified diagnosis with patient and/or coordinating care for this patient.       Tory Nevarez PA-C     Vascular and Interventional Radiology  [facility]  [phone] Inpatient Nursing Quarterback  [phone] Nursing Line 7a-5p  [phone] Resident on-call pager    NON-Urgent on call weekends and after hours weekdays (5pm - 5am) IR pager: [phone]  Urgent & emergent on call weekends and after hours weekdays (5pm-7am) IR pager: [phone]

## 2024-11-27 ENCOUNTER — PHARMACY VISIT (OUTPATIENT)
Dept: PHARMACY | Facility: CLINIC | Age: 44
End: 2024-11-27
Payer: COMMERCIAL

## 2024-11-27 VITALS
HEART RATE: 94 BPM | OXYGEN SATURATION: 98 % | SYSTOLIC BLOOD PRESSURE: 108 MMHG | DIASTOLIC BLOOD PRESSURE: 71 MMHG | WEIGHT: 238 LBS | HEIGHT: 68 IN | RESPIRATION RATE: 18 BRPM | BODY MASS INDEX: 36.07 KG/M2 | TEMPERATURE: 97.2 F

## 2024-11-27 LAB
ALBUMIN SERPL BCP-MCNC: 4.6 G/DL (ref 3.4–5)
ANION GAP SERPL CALC-SCNC: 15 MMOL/L (ref 10–20)
BUN SERPL-MCNC: 15 MG/DL (ref 6–23)
CALCIUM SERPL-MCNC: 9.9 MG/DL (ref 8.6–10.6)
CHLORIDE SERPL-SCNC: 96 MMOL/L (ref 98–107)
CO2 SERPL-SCNC: 28 MMOL/L (ref 21–32)
CREAT SERPL-MCNC: 1.25 MG/DL (ref 0.5–1.3)
EGFRCR SERPLBLD CKD-EPI 2021: 72 ML/MIN/1.73M*2
ERYTHROCYTE [DISTWIDTH] IN BLOOD BY AUTOMATED COUNT: 13.1 % (ref 11.5–14.5)
GLUCOSE SERPL-MCNC: 88 MG/DL (ref 74–99)
HCT VFR BLD AUTO: 39.2 % (ref 41–52)
HGB BLD-MCNC: 13.1 G/DL (ref 13.5–17.5)
MCH RBC QN AUTO: 29 PG (ref 26–34)
MCHC RBC AUTO-ENTMCNC: 33.4 G/DL (ref 32–36)
MCV RBC AUTO: 87 FL (ref 80–100)
NRBC BLD-RTO: 0 /100 WBCS (ref 0–0)
PHOSPHATE SERPL-MCNC: 3.9 MG/DL (ref 2.5–4.9)
PLATELET # BLD AUTO: 349 X10*3/UL (ref 150–450)
POTASSIUM SERPL-SCNC: 3.3 MMOL/L (ref 3.5–5.3)
RBC # BLD AUTO: 4.51 X10*6/UL (ref 4.5–5.9)
SODIUM SERPL-SCNC: 136 MMOL/L (ref 136–145)
WBC # BLD AUTO: 9.8 X10*3/UL (ref 4.4–11.3)

## 2024-11-27 PROCEDURE — 85027 COMPLETE CBC AUTOMATED: CPT | Performed by: STUDENT IN AN ORGANIZED HEALTH CARE EDUCATION/TRAINING PROGRAM

## 2024-11-27 PROCEDURE — 2500000001 HC RX 250 WO HCPCS SELF ADMINISTERED DRUGS (ALT 637 FOR MEDICARE OP)

## 2024-11-27 PROCEDURE — 2500000002 HC RX 250 W HCPCS SELF ADMINISTERED DRUGS (ALT 637 FOR MEDICARE OP, ALT 636 FOR OP/ED)

## 2024-11-27 PROCEDURE — 80069 RENAL FUNCTION PANEL: CPT

## 2024-11-27 PROCEDURE — RXMED WILLOW AMBULATORY MEDICATION CHARGE

## 2024-11-27 PROCEDURE — 36415 COLL VENOUS BLD VENIPUNCTURE: CPT | Performed by: STUDENT IN AN ORGANIZED HEALTH CARE EDUCATION/TRAINING PROGRAM

## 2024-11-27 RX ORDER — SENNOSIDES 8.6 MG/1
1 TABLET ORAL DAILY
Qty: 14 TABLET | Refills: 0 | Status: SHIPPED | OUTPATIENT
Start: 2024-11-27 | End: 2024-12-06 | Stop reason: WASHOUT

## 2024-11-27 RX ORDER — SULFAMETHOXAZOLE AND TRIMETHOPRIM 800; 160 MG/1; MG/1
1 TABLET ORAL 2 TIMES DAILY
Qty: 10 TABLET | Refills: 0 | Status: SHIPPED | OUTPATIENT
Start: 2024-11-27 | End: 2024-12-02

## 2024-11-27 RX ADMIN — OXYCODONE 10 MG: 5 TABLET ORAL at 00:43

## 2024-11-27 RX ADMIN — SENNOSIDES AND DOCUSATE SODIUM 2 TABLET: 50; 8.6 TABLET ORAL at 08:33

## 2024-11-27 RX ADMIN — OXYCODONE 10 MG: 5 TABLET ORAL at 10:13

## 2024-11-27 RX ADMIN — OXYCODONE 10 MG: 5 TABLET ORAL at 04:48

## 2024-11-27 RX ADMIN — TOPIRAMATE 25 MG: 25 TABLET, FILM COATED ORAL at 08:33

## 2024-11-27 RX ADMIN — SULFAMETHOXAZOLE AND TRIMETHOPRIM 1 TABLET: 800; 160 TABLET ORAL at 08:33

## 2024-11-27 RX ADMIN — LOSARTAN POTASSIUM 100 MG: 50 TABLET, FILM COATED ORAL at 08:33

## 2024-11-27 RX ADMIN — HYDROCHLOROTHIAZIDE 25 MG: 25 TABLET ORAL at 08:33

## 2024-11-27 RX ADMIN — DULOXETINE HYDROCHLORIDE 90 MG: 30 CAPSULE, DELAYED RELEASE ORAL at 08:32

## 2024-11-27 RX ADMIN — ACETAMINOPHEN 975 MG: 325 TABLET ORAL at 03:12

## 2024-11-27 ASSESSMENT — PAIN SCALES - GENERAL
PAINLEVEL_OUTOF10: 7

## 2024-11-27 NOTE — DISCHARGE SUMMARY
Discharge Diagnosis  Urine leak in renal parenchyma after kidney transplant ([facility])    Issues Requiring Follow-Up      Test Results Pending At Discharge  Pending Labs       Order Current Status    Extra Urine Gray Tube Collected (11/21/24 6082)    Urinalysis with Reflex Culture and Microscopic In process            Hospital Course  Skyler Shelton is a 44 year old male with a significant pmh of HTN, YI, migraines and depression who was diagnosed with 3.3cm Bosniak 4 and 2.8cm Bosniak 2F left renal lesions s/p left open partial nephrectomy with Dr. Shannon on 10/21. Findings were two masses removed separately with defects repaired primarily. Path was [facility] for the Bosniak 4 lesion and fibrotic nodule for the 2F lesion. Post-op course was uneventful, CRISTIAN creatinine was negative for urine leak and he was discharged 10/23. He returned to the ED 10/28 with c/o left flank pain, swelling around the incision site and continuous drainage from the CIRSTIAN site. CT scan at the time showed a 16 x 3.9cm left perirenal fluid collection in communication with the abdominal wall c/f urine leak. He elected for outpatient drain placement with IR which he underwent on 10/31. He again returned to the ED 11/10 with continued symptoms, though CT showed decrease in size of the fluid collection to 6.6cm with pigtail drain within the collection. He followed up with Dr. Shannon in the office without resolution of pain, with subsequent plan for stent and nicole placement to facilitate drainage which were performed 11/18. Findings were extravasation of contrast from left upper pole calyx. He returned to the ED 11/21 with CT showing slight worsening of the collection. Drain was replaced by IR 11/22 with ~300cc output/day. UOP was good, nicole was removed and he passed a TOV. After nicole removal, patient c/o recurrent positional flank pain and swelling above the incision, but declined nicole replacement. He then underwent drain replacement with  advancement into the subfascial space on 11/25. He is now recovering well on RNF. Patient to be discharged home 11/27 with 7 days of bactrim, currently on day 2, 5 more days from discharge. Follow up outpatient with Dr. Shannon 11/29.    Pertinent Physical Exam At Time of Discharge  Physical Exam  Constitutional: Alert, in NAD  HEENT: Normocephalic, atraumatic  Neuro: A&O x3  CV: tachycardic, normotensive   Pulm: Non-laboured breathing on room air  GI: Abdomen soft, non-distended, non-tender  : Mild Left flank incisional pain. CRISTIAN drain with urinous output. New IR drain with minimal SS output. Rash around former skin tape sites, more consistent with dermatitis vs cellulitis. Voiding spontaneously  Skin: Left flank incision well healed. Rash noted as above.   Musculoskeletal: JOSETTE  Extremities: no edema or cyanosis noted    Home Medications     Medication List      CONTINUE taking these medications     acetaminophen 325 mg tablet; Commonly known as: Tylenol; Take 3 tablets   (975 mg) by mouth every 6 hours.   * DULoxetine 30 mg DR capsule; Commonly known as: Cymbalta   * DULoxetine 60 mg DR capsule; Commonly known as: Cymbalta   ERGOCALCIFEROL (VITAMIN D2) ORAL   hydroCHLOROthiazide 25 mg tablet; Commonly known as: HYDRODiuril   losartan 100 mg tablet; Commonly known as: Cozaar   methocarbamol 500 mg tablet; Commonly known as: Robaxin; Take 1 tablet   (500 mg) by mouth every 6 hours for 5 days.   * oxyCODONE 5 mg immediate release tablet; Commonly known as:   Roxicodone; Take 1 tablet (5 mg) by mouth every 6 hours if needed for   severe pain (7 - 10) for up to 3 days.   polyethylene glycol 17 gram/dose powder; Commonly known as: Glycolax,   Miralax   sildenafil 50 mg tablet; Commonly known as: Viagra   topiramate 25 mg tablet; Commonly known as: Topamax  * This list has 3 medication(s) that are the same as other medications   prescribed for you. Read the directions carefully, and ask your doctor or   other care  provider to review them with you.     STOP taking these medications     bacitracin 500 unit/gram ointment   oxybutynin 5 mg tablet; Commonly known as: Ditropan   oxyCODONE-acetaminophen 5-325 mg tablet; Commonly known as: Percocet   phenazopyridine 200 mg tablet; Commonly known as: Pyridium     ASK your doctor about these medications     * oxyCODONE 5 mg immediate release tablet; Commonly known as:   Roxicodone; Take 1 tablet (5 mg) by mouth every 6 hours if needed for   severe pain (7 - 10) for up to 2 days.; Ask about: Should I take this   medication?  * This list has 1 medication(s) that are the same as other medications   prescribed for you. Read the directions carefully, and ask your doctor or   other care provider to review them with you.       Outpatient Follow-Up  Future Appointments   Date Time Provider Department Center   11/29/2024 10:00 AM MD ONOFRE Torres MD

## 2024-11-29 ENCOUNTER — OFFICE VISIT (OUTPATIENT)
Dept: UROLOGY | Facility: HOSPITAL | Age: 44
End: 2024-11-29
Payer: COMMERCIAL

## 2024-11-29 VITALS — HEART RATE: 108 BPM | SYSTOLIC BLOOD PRESSURE: 131 MMHG | DIASTOLIC BLOOD PRESSURE: 89 MMHG

## 2024-11-29 DIAGNOSIS — N28.89 RENAL MASS, LEFT: Primary | ICD-10-CM

## 2024-11-29 PROCEDURE — 3075F SYST BP GE 130 - 139MM HG: CPT | Performed by: UROLOGY

## 2024-11-29 PROCEDURE — 99211 OFF/OP EST MAY X REQ PHY/QHP: CPT | Performed by: UROLOGY

## 2024-11-29 PROCEDURE — 3079F DIAST BP 80-89 MM HG: CPT | Performed by: UROLOGY

## 2024-11-29 ASSESSMENT — PATIENT HEALTH QUESTIONNAIRE - PHQ9
2. FEELING DOWN, DEPRESSED OR HOPELESS: NOT AT ALL
1. LITTLE INTEREST OR PLEASURE IN DOING THINGS: NOT AT ALL
SUM OF ALL RESPONSES TO PHQ9 QUESTIONS 1 AND 2: 0

## 2024-11-29 NOTE — PROGRESS NOTES
"POV    Last Visit: 11/14/24     HISTORY OF PRESENT ILLNESS:   Skyler Shelton is a 44 y.o. male who is being seen today for POV s/p left open partial nephrectomy 10/21/24 for renal mass   - abdominal US on 9/11/24 demonstrating a left 2.7 cm cystic lesion with possible peripheral solid component.   PAST MEDICAL HISTORY:  Past Medical History:   Diagnosis Date    Chronic headaches     Depression     Hypertension     Obesity     Sleep apnea        PAST SURGICAL HISTORY:  Past Surgical History:   Procedure Laterality Date    ANAL FISTULOTOMY      COLONOSCOPY      NEPHRECTOMY          ALLERGIES:   Allergies   Allergen Reactions    Penicillins Rash and Unknown        MEDICATIONS:   Current Outpatient Medications   Medication Instructions    acetaminophen (TYLENOL) 975 mg, oral, Every 6 hours    DULoxetine (Cymbalta) 60 mg DR capsule 1 capsule, Daily (0630)    DULoxetine (CYMBALTA) 30 mg, Daily    ERGOCALCIFEROL, VITAMIN D2, ORAL 5,000 Units, Daily    hydroCHLOROthiazide (HYDRODIURIL) 25 mg, Daily    losartan (COZAAR) 100 mg, Daily    methocarbamol (ROBAXIN) 500 mg, oral, Every 6 hours scheduled    oxyCODONE (ROXICODONE) 5 mg, oral, Every 6 hours PRN    polyethylene glycol (GLYCOLAX, MIRALAX) 17 g, oral, Daily PRN    senna 8.6 mg, oral, Daily    sildenafil (Viagra) 50 mg tablet 1 tablet, As needed    sulfamethoxazole-trimethoprim (Bactrim DS) 800-160 mg tablet 1 tablet, oral, 2 times daily    topiramate (TOPAMAX) 25 mg, Daily        PHYSICAL EXAM:  There were no vitals taken for this visit.  Constitutional: Patient appears well-developed and well-nourished. No distress.    Pulmonary/Chest: Effort normal. No respiratory distress.   Abdominal: Soft, ND NT  : WNL  Musculoskeletal: Normal range of motion.    Neurological: Alert and oriented to person, place, and time.  Psychiatric: Normal mood and affect. Behavior is normal. Thought content normal.      Labs:  No results found for: \"TESTOSTERONE\"  No results found for: " "\"PSA\"  No components found for: \"CBC\"  Lab Results   Component Value Date    CREATININE 1.25 11/27/2024     No components found for: \"TESTOTMS\"  No results found for: \"TESTF\"    Imaging: Renal CT on 9/19/24 demonstrated 3.3 cm Bosniak 4 cystic and solid mass arising from the posterior medial  interpolar right renal cortex concerning for solid renal neoplasm.  No evidence of adrenal or renal vein involvement.    2.8 cm Bosniak 2F cystic lesion arising from the superior pole of the left kidney.  No lymphadenopathy within the abdomen. (Upon reviewing imaging from [facility] with patient , right kidney was found to be totally normal and both Bosniak 4 mass and Bosniak 2F cyst were on the left kidney. We will call the radiologist and discuss this)     Surgical pathology 10/21/24: Renal cell carcinoma, clear-cell type, with cystic and degenerative changes   Results reviewed with patient. Patient reassured.     Discussion: Patient is s/p left open partial nephrectomy 10/21/24 for renal mass. Presented to the ED 11/10/24 for severe leakage around the CRISTIAN drain around the incision site. Patient had left abdominal drain placed percutaneously with US Guidance and started draining a few blood clots and haD some increased pain.  No fever or chills.  No nausea vomiting or diarrhea.  No history of trauma or injury. They put a stoma bag around the drain followed by stent and nicole placement to facilitate drainage which were performed 11/18. Drain was replaced by IR 11/22 with ~300cc output/day. UOP was good, nicole was removed and he passed a TOV. After nicole removal, patient c/o recurrent positional flank pain and swelling above the incision, but declined nicole replacement. He then underwent drain replacement with advancement into the subfascial space on 11/25. He is now recovering well on RNF. Drainage output decreasing. One of the drains removed today which did not have much output. Left remaining 2 drains in.   Assessment: "      No diagnosis found.      Plan:   Virtual follow up next week Thursday   All questions and concerns were addressed. Patient verbalizes understanding and has no other questions at this time.     Scribe Attestation  By signing my name below, I, Purnima Farah   attest that this documentation has been prepared under the direction and in the presence of Baldo Shannon MD.

## 2024-12-05 ENCOUNTER — TELEMEDICINE (OUTPATIENT)
Dept: UROLOGY | Facility: HOSPITAL | Age: 44
End: 2024-12-05
Payer: COMMERCIAL

## 2024-12-05 DIAGNOSIS — C64.2 MALIGNANT NEOPLASM OF LEFT KIDNEY (MULTI): ICD-10-CM

## 2024-12-05 PROCEDURE — 99024 POSTOP FOLLOW-UP VISIT: CPT | Performed by: UROLOGY

## 2024-12-05 NOTE — PROGRESS NOTES
Virtual or Telephone Consent    An interactive audio and video telecommunication system which permits real time communications between the patient (at the originating site) and provider (at the distant site) was utilized to provide this telehealth service.   Verbal consent was requested and obtained from Skyler Shelton on this date, 12/05/24 for a telehealth visit.   FUV    Last Visit: 11/14/24     HISTORY OF PRESENT ILLNESS:   Skyler Shelton is a 44 y.o. male who is being seen today for FUV s/p left open partial nephrectomy 10/21/24 for renal mass   - abdominal US on 9/11/24 demonstrating a left 2.7 cm cystic lesion with possible peripheral solid component.   PAST MEDICAL HISTORY:  Past Medical History:   Diagnosis Date    Chronic headaches     Depression     Hypertension     Obesity     Sleep apnea        PAST SURGICAL HISTORY:  Past Surgical History:   Procedure Laterality Date    ANAL FISTULOTOMY      COLONOSCOPY      NEPHRECTOMY          ALLERGIES:   Allergies   Allergen Reactions    Penicillins Rash and Unknown        MEDICATIONS:   Current Outpatient Medications   Medication Instructions    acetaminophen (TYLENOL) 975 mg, oral, Every 6 hours    DULoxetine (Cymbalta) 60 mg DR capsule 1 capsule, Daily (0630)    DULoxetine (CYMBALTA) 30 mg, Daily    ERGOCALCIFEROL, VITAMIN D2, ORAL 5,000 Units, Daily    hydroCHLOROthiazide (HYDRODIURIL) 25 mg, Daily    losartan (COZAAR) 100 mg, Daily    methocarbamol (ROBAXIN) 500 mg, oral, Every 6 hours scheduled    polyethylene glycol (GLYCOLAX, MIRALAX) 17 g, oral, Daily PRN    senna 8.6 mg, oral, Daily    sildenafil (Viagra) 50 mg tablet 1 tablet, As needed    topiramate (TOPAMAX) 25 mg, Daily        PHYSICAL EXAM:  There were no vitals taken for this visit.  Constitutional: Patient appears well-developed and well-nourished. No distress.    Pulmonary/Chest: Effort normal. No respiratory distress.   Abdominal: Soft, ND NT  : WNL  Musculoskeletal: Normal range of motion.   "  Neurological: Alert and oriented to person, place, and time.  Psychiatric: Normal mood and affect. Behavior is normal. Thought content normal.      Labs:  No results found for: \"TESTOSTERONE\"  No results found for: \"PSA\"  No components found for: \"CBC\"  Lab Results   Component Value Date    CREATININE 1.25 11/27/2024     No components found for: \"TESTOTMS\"  No results found for: \"TESTF\"    Imaging: Renal CT on 9/19/24 demonstrated 3.3 cm Bosniak 4 cystic and solid mass arising from the posterior medial  interpolar right renal cortex concerning for solid renal neoplasm.  No evidence of adrenal or renal vein involvement.    2.8 cm Bosniak 2F cystic lesion arising from the superior pole of the left kidney.  No lymphadenopathy within the abdomen. (Upon reviewing imaging from [facility] with patient , right kidney was found to be totally normal and both Bosniak 4 mass and Bosniak 2F cyst were on the left kidney. We will call the radiologist and discuss this)     Surgical pathology 10/21/24: Renal cell carcinoma, clear-cell type, with cystic and degenerative changes   Results reviewed with patient. Patient reassured.     Discussion: Patient is s/p left open partial nephrectomy 10/21/24 for renal mass. Presented to the ED 11/10/24 for severe leakage around the CRISTIAN drain around the incision site. Patient had left abdominal drain placed percutaneously with US Guidance and started draining a few blood clots and had some increased pain.  No fever or chills.  No nausea vomiting or diarrhea.  No history of trauma or injury. They put a stoma bag around the drain followed by stent and nicole placement to facilitate drainage which were performed 11/18. Drain was replaced by IR 11/22 with ~300cc output/day. UOP was good, nicole was removed and he passed a TOV. After nicole removal, patient c/o recurrent positional flank pain and swelling above the incision, but declined nicole replacement. He then underwent drain replacement with " advancement into the subfascial space on 11/25. He is now recovering well on RNF. Drainage output decreasing. One of the drains removed at last visit which did not have much output. Left remaining 2 drains in. Patient notes his leaking has stopped now, denies any pain or swelling. Doing much better. Will remove drain tomorrow and have stent removal in January.   Assessment:      1. Malignant neoplasm of left kidney (Multi)  CT kidney w and wo IV contrast    XR chest 2 views    Basic Metabolic Panel          FUV  Plan:   Follow up in 4 months with a renal CT and CXR and BMP prior.   All questions and concerns were addressed. Patient verbalizes understanding and has no other questions at this time.     Scribe Attestation  By signing my name below, ILudy Scribe   attest that this documentation has been prepared under the direction and in the presence of Baldo Shannon MD.

## 2024-12-06 ENCOUNTER — OFFICE VISIT (OUTPATIENT)
Dept: UROLOGY | Facility: HOSPITAL | Age: 44
End: 2024-12-06
Payer: COMMERCIAL

## 2024-12-06 DIAGNOSIS — N28.89 RENAL MASS, LEFT: Primary | ICD-10-CM

## 2024-12-06 PROCEDURE — 99213 OFFICE O/P EST LOW 20 MIN: CPT | Performed by: NURSE PRACTITIONER

## 2024-12-06 PROCEDURE — 1036F TOBACCO NON-USER: CPT | Performed by: NURSE PRACTITIONER

## 2024-12-06 NOTE — PROGRESS NOTES
[facility] [place]  Outpatient Clinic Note    Patient Name:  Skyler Shelton  MRN:  [id number]  :  1980  Date of Service: 2024     Visit type: Follow up visit    HPI    Interval History:  Skyler Shelton is a 44 y.o. male who is being seen today for  problems listed below.     Problem list/Chief complaints:  Renal mass - s/p left open partial nephrectomy 10/21/24    12/5/24: Telehealth visit with Dr. Shannon. Skyler Shelton is a 44 y.o. male who is being seen today for FUV s/p left open partial nephrectomy 10/21/24 for renal mass. Patient is s/p left open partial nephrectomy 10/21/24 for renal mass. Presented to the ED 11/10/24 for severe leakage around the CRISTIAN drain around the incision site. Patient had left abdominal drain placed percutaneously with US Guidance and started draining a few blood clots and had some increased pain.  No fever or chills.  No nausea vomiting or diarrhea.  No history of trauma or injury. They put a stoma bag around the drain followed by stent and nicole placement to facilitate drainage which were performed . Drain was replaced by IR  with ~300cc output/day. UOP was good, nicole was removed and he passed a TOV. After nicole removal, patient c/o recurrent positional flank pain and swelling above the incision, but declined nicole replacement. He then underwent drain replacement with advancement into the subfascial space on . He is now recovering well on RNF. Drainage output decreasing. One of the drains removed at last visit which did not have much output. Left remaining 2 drains in. Patient notes his leaking has stopped now, denies any pain or swelling. Doing much better. Will remove drain tomorrow and have stent removal in January.   Plan: Follow up in 4 months with a renal CT and CXR and BMP prior.     24: Patient presents for drain removal. He is accompanied by his wife. He has not had any output from CRISTIAN drain. He has no complaints, he denies fever or chills.  Drain removed with no complications.     Past Medical History:   Diagnosis Date    Chronic headaches     Depression     Hypertension     Obesity     Sleep apnea        Past Surgical History:   Procedure Laterality Date    ANAL FISTULOTOMY      COLONOSCOPY      NEPHRECTOMY         Social History     Socioeconomic History    Marital status:      Spouse name: Not on file    Number of children: Not on file    Years of education: Not on file    Highest education level: Not on file   Occupational History    Not on file   Tobacco Use    Smoking status: Never    Smokeless tobacco: Never   Vaping Use    Vaping status: Never Used   Substance and Sexual Activity    Alcohol use: Not Currently     Comment: rare    Drug use: Never    Sexual activity: Defer   Other Topics Concern    Not on file   Social History Narrative    Not on file     Social Drivers of Health     Financial Resource Strain: Patient Declined (11/22/2024)    Overall Financial Resource Strain (CARDIA)     Difficulty of Paying Living Expenses: Patient declined   Food Insecurity: Patient Declined (11/22/2024)    Hunger Vital Sign     Worried About Running Out of Food in the Last Year: Patient declined     Ran Out of Food in the Last Year: Patient declined   Transportation Needs: Patient Declined (11/22/2024)    PRAPARE - Transportation     Lack of Transportation (Medical): Patient declined     Lack of Transportation (Non-Medical): Patient declined   Physical Activity: Not on file   Stress: Not on file   Social Connections: Not on file   Intimate Partner Violence: Not At Risk (11/22/2024)    Humiliation, Afraid, Rape, and Kick questionnaire     Fear of Current or Ex-Partner: No     Emotionally Abused: No     Physically Abused: No     Sexually Abused: No   Housing Stability: Patient Declined (11/22/2024)    Housing Stability Vital Sign     Unable to Pay for Housing in the Last Year: Patient declined     Number of Times Moved in the Last Year: 0     Homeless in  the Last Year: Patient declined       Allergies   Allergen Reactions    Penicillins Rash and Unknown          Current Outpatient Medications:     acetaminophen (Tylenol) 325 mg tablet, Take 3 tablets (975 mg) by mouth every 6 hours., Disp: , Rfl:     DULoxetine (Cymbalta) 30 mg DR capsule, Take 1 capsule (30 mg) by mouth once daily. Take with 60 mg capsule for 90 mg dose. Do not crush or chew., Disp: , Rfl:     DULoxetine (Cymbalta) 60 mg DR capsule, Take 1 capsule (60 mg) by mouth early in the morning.., Disp: , Rfl:     ERGOCALCIFEROL, VITAMIN D2, ORAL, Take 5,000 Units by mouth once daily., Disp: , Rfl:     hydroCHLOROthiazide (HYDRODiuril) 25 mg tablet, Take 1 tablet (25 mg) by mouth once daily., Disp: , Rfl:     losartan (Cozaar) 100 mg tablet, Take 1 tablet (100 mg) by mouth once daily., Disp: , Rfl:     methocarbamol (Robaxin) 500 mg tablet, Take 1 tablet (500 mg) by mouth every 6 hours for 5 days., Disp: 20 tablet, Rfl: 0    polyethylene glycol (Glycolax, Miralax) 17 gram/dose powder, Mix 17 g of powder and drink once daily as needed for constipation., Disp: , Rfl:     sennosides (Senokot) 8.6 mg tablet, Take 1 tablet (8.6 mg) by mouth once daily for 14 days., Disp: 14 tablet, Rfl: 0    sildenafil (Viagra) 50 mg tablet, Take 1 tablet (50 mg) by mouth if needed for erectile dysfunction., Disp: , Rfl:     topiramate (Topamax) 25 mg tablet, Take 1 tablet (25 mg) by mouth once daily., Disp: , Rfl:      Review of system:  All other systems have been reviewed and are negative for complaints      Last recorded vitals:  There were no vitals taken for this visit.    Physical Exam:  General: Appears comfortable and in no apparent distress.  Head: Normocephalic, atraumatic  Eyes: Non-injected conjunctiva, sclera clear, no proptosis  Lungs: Breathing is easy, non-labored. Speaking in clear and complete sentences. Normal diaphragmatic movement.  Cardiovascular: no peripheral edema, cyanosis or pallor.   Abdomen: soft,  non-distended, non-tender. Left abdominal incision well approximated, healing well, no s/sx of infection noted.  : Bladder: non tender, not distended  MSK: Ambulatory with steady gait, unassisted  Skin: No visible rashes or lesions  Neurologic: Alert, oriented to person, place, and time  Psychiatric: mood and affect appropriate      Imaging  Renal CT on 9/19/24 demonstrated 3.3 cm Bosniak 4 cystic and solid mass arising from the posterior medial  interpolar right renal cortex concerning for solid renal neoplasm.  No evidence of adrenal or renal vein involvement.    2.8 cm Bosniak 2F cystic lesion arising from the superior pole of the left kidney.  No lymphadenopathy within the abdomen. (Upon reviewing imaging from [facility] with patient , right kidney was found to be totally normal and both Bosniak 4 mass and Bosniak 2F cyst were on the left kidney. We will call the radiologist and discuss this)      Surgical pathology 10/21/24: Renal cell carcinoma, clear-cell type, with cystic and degenerative changes       Labs  Lab Results   Component Value Date    WBC 9.8 11/27/2024    HGB 13.1 (L) 11/27/2024    HCT 39.2 (L) 11/27/2024    MCV 87 11/27/2024     11/27/2024     Lab Results   Component Value Date    GLUCOSE 88 11/27/2024    CALCIUM 9.9 11/27/2024     11/27/2024    K 3.3 (L) 11/27/2024    CO2 28 11/27/2024    CL 96 (L) 11/27/2024    BUN 15 11/27/2024    CREATININE 1.25 11/27/2024         Assessment and Plan:  Skyler Shelton is a 44 y.o. male with history of renal mass s/p left open partial nephrectomy 10/21/24, who presents for follow up and drain removal.     Drain removed and patient tolerated well. He was educated on postoperative incision care. He was educated on activity restrictions.     Plan:  -CRISTIAN drain removed with no complications, bandaid placed  -He is waiting for Dr. Shannon's team to reach out to him regarding stent removal  -Renal CT, CXR and CMP ordered in 4 months  -Follow-up as  scheduled, or sooner if needed, to reassess symptoms and for medication refill.    All questions and concerns were addressed. Patient verbalizes understanding and has no other questions at this time.     Some elements copied from Dr. Shannon's note on 12/5/24, the elements have been updated and all reflect current decision making from today, 12/06/24     MALIA Enamorado-CNP   [facility] [place]  12/06/24 12:15 PM

## 2024-12-11 ENCOUNTER — PROCEDURE VISIT (OUTPATIENT)
Dept: UROLOGY | Facility: HOSPITAL | Age: 44
End: 2024-12-11
Payer: COMMERCIAL

## 2024-12-11 DIAGNOSIS — Z79.2 PROPHYLACTIC ANTIBIOTIC: Primary | ICD-10-CM

## 2024-12-11 PROCEDURE — 52310 CYSTOSCOPY AND TREATMENT: CPT | Performed by: STUDENT IN AN ORGANIZED HEALTH CARE EDUCATION/TRAINING PROGRAM

## 2024-12-11 RX ORDER — CIPROFLOXACIN 500 MG/1
500 TABLET ORAL ONCE
Status: SHIPPED | OUTPATIENT
Start: 2024-12-11

## 2024-12-11 NOTE — PROGRESS NOTES
Subjective   Patient ID: Skyler Shelton is a 44 y.o. male    HPI  44 y.o. male who presents for a stent removal. 12/5/24: Telehealth visit with Dr. Shannon. Skyler Shelton is a 44 y.o. male who is being seen today for FUV s/p left open partial nephrectomy 10/21/24 for renal mass. Patient is s/p left open partial nephrectomy 10/21/24 for renal mass. Presented to the ED 11/10/24 for severe leakage around the CRISTIAN drain around the incision site. Patient had left abdominal drain placed percutaneously with US Guidance and started draining a few blood clots and had some increased pain.  No fever or chills.  No nausea vomiting or diarrhea.  No history of trauma or injury. They put a stoma bag around the drain followed by stent and nicole placement to facilitate drainage which were performed 11/18. Drain was replaced by IR 11/22 with ~300cc output/day. UOP was good, nicole was removed and he passed a TOV. After nicole removal, patient c/o recurrent positional flank pain and swelling above the incision, but declined nicole replacement. He then underwent drain replacement with advancement into the subfascial space on 11/25. He is now recovering well on RNF. Drainage output decreasing. One of the drains removed at last visit which did not have much output. Left remaining 2 drains in. Patient notes his leaking has stopped now, denies any pain or swelling. Doing much better. Will remove drain tomorrow and have stent removal in January.   Plan: Follow up in 4 months with a renal CT and CXR and BMP prior.      12/6/24: Patient presents for drain removal. He is accompanied by his wife. He has not had any output from CRISTIAN drain. He has no complaints, he denies fever or chills. Drain removed with no complications.     Today he reports painful urination.              Review of Systems    All systems were reviewed. Anything negative was noted in the HPI.    Objective   Physical Exam    General: Well developed, well nourished, alert and  cooperative, appears in no acute distress   Eyes: Non-injected conjunctiva, sclera clear, no proptosis   Cardiac: Extremities are warm and well perfused. No edema, cyanosis or pallor   Lungs: Breathing is easy, non-labored. Speaking in clear and complete sentences. Normal diaphragmatic movement   MSK: Ambulatory with steady gait, unassisted   Neuro: Alert and oriented to person, place, and time   Psych: Demonstrates good judgment and reason, without hallucinations, abnormal affect or abnormal behaviors   Skin: No obvious lesions, no rashes       No CVA tenderness bilaterally   No suprapubic pain or discomfort       Past Medical History:   Diagnosis Date    Chronic headaches     Depression     Hypertension     Obesity     Sleep apnea          Past Surgical History:   Procedure Laterality Date    ANAL FISTULOTOMY      COLONOSCOPY      NEPHRECTOMY           Procedure:  The patient was prepped using a Betadine solution. Lidocaine jelly was instilled into the urethra. The flexible cystoscope was sterilely inserted into the urethra and formal cystoscopy performed in a systematic fashion. For detailed findings of the procedure, please see Dr. Maradiaga’s remarks below  Scope A used, Cipro 500 mg p.o. given    Patient ID: Skyler Shelton is a 44 y.o. male.    Cystoscopy    Date/Time: 12/11/2024 2:46 PM    Performed by: Augie Maradiaga MD MPH  Authorized by: Augie Maradiaga MD MPH    Procedure - Bladder Cystoscopy:     Procedure details: simple removal of a foreign body, stone, or stent      Stent removed with Grasper     Assessment/Plan   Cystoscopic evaluation, stent removal    44 y.o. male who presents for the above condition, We had a very long and extensive discussion with the patient regarding the pathophysiology, differential diagnosis, risk factor, management, natural history, incidence and diagnostic work-up of the condition.      Patients stent was removed today with no complications. Stone prevention discussed.  Diet reviewed, Discussed fluid intake.    Plan:  - Follow-up with Dr. Shannon    E&M visit today is associated with current or anticipated ongoing medical care services related to a patient's single, serious condition or a complex condition.     12/11/2024    Scribe Attestation  By signing my name below, Kika AYALA Scribe attest that this documentation has been prepared under the direction and in the presence of Dr. Augie Maradiaga.

## 2024-12-11 NOTE — PROGRESS NOTES
Patient ID: Skyler Shelton is a 44 y.o. male.    Cystoscopy    Date/Time: 12/11/2024 2:45 PM    Performed by: Augie Maradiaga MD MPH  Authorized by: Augie Maradiaga MD MPH    Procedure - Bladder Cystoscopy:     Procedure details: cystoscopy    PROCEDURE NOTE:    PREOPERATIVE DIAGNOSIS:  lutS    POSTOPERATIVE DIAGNOSIS:  Same    OPERATION:  Flexible Cystourethroscopy      SURGEON:  Augie Maradiaga MD MPH    ANESTHESIA:  2%  lidocaine jelly    COMPLICATIONS:  None    EBL: Minimal      DISPOSITION:  The patient was discharged home after the procedure, per routine.    INDICATIONS: :  Mr. Shelton is a 44 y.o. patient with a history of lutS who presents today for Cystoscopy.     The indications, risks and benefits of this procedure were discussed with the patient, consent was obtained prior to the procedure, and to the best of my judgement the patient seemed to understand and agree to the procedure.    PROCEDURE:  The patient  was brought into the procedure suite and informed consent was reviewed and confirmed. Vital signs were obtained prior to the procedure: There were no vitals taken for this visit..  The patient was escorted onto the stretcher, placed supine, prepped with betadine and draped in the usual standard surgical fashion.  Intraurethral 2% viscous lidocaine jelly was used for local analgesia.  A 16 K[place] flexible cystourethroscope was inserted into the urethra.   The penile urethra was normal.  The prostate urethra was normal.  Upon entering the bladder the entire bladder was surveyed in a 360 degree fashion.  The left and right ureteral orifices were in normal orthotopic position effluxing clear yellow urine, bilaterally.   There was no evidence of any bladder lesions, foreign objects, stones or evidence of any mucosal changes. The cystoscope was then retroflexed.  The bladder neck was then further examined without any evidence of lesions. The scope was then removed and in an antegrade fashion, the  urethra and bladder were again resurveyed with no evidence of additional lesions.  The cystoscope was then fully removed.   The patient tolerated the procedure well.  Vitals were stable after the procedure.  The patient was able to void and was discharged home.  Verbal and written Post procedure instructions were reviewed with the patient.    IMPRESSION:  OAB  Normal cysto    PLAN:  Ditropan 5mg PRN    Skyler Delesk is noted in assessment to have a @FLOWCONTINUOUS(18)@/10. The patient's individualized treatment will therefore consist of: {PAIN TX OPTIONS:97306}.

## 2024-12-18 DIAGNOSIS — N28.89 RENAL MASS: ICD-10-CM

## 2024-12-19 ENCOUNTER — PHARMACY VISIT (OUTPATIENT)
Dept: PHARMACY | Facility: CLINIC | Age: 44
End: 2024-12-19
Payer: MEDICARE

## 2024-12-19 ENCOUNTER — APPOINTMENT (OUTPATIENT)
Dept: RADIOLOGY | Facility: HOSPITAL | Age: 44
End: 2024-12-19
Payer: COMMERCIAL

## 2024-12-19 ENCOUNTER — HOSPITAL ENCOUNTER (EMERGENCY)
Facility: HOSPITAL | Age: 44
Discharge: HOME | End: 2024-12-19
Payer: COMMERCIAL

## 2024-12-19 VITALS
HEART RATE: 96 BPM | RESPIRATION RATE: 17 BRPM | SYSTOLIC BLOOD PRESSURE: 118 MMHG | BODY MASS INDEX: 34.86 KG/M2 | TEMPERATURE: 98.2 F | DIASTOLIC BLOOD PRESSURE: 83 MMHG | HEIGHT: 68 IN | OXYGEN SATURATION: 97 % | WEIGHT: 230 LBS

## 2024-12-19 DIAGNOSIS — R10.9 FLANK PAIN: Primary | ICD-10-CM

## 2024-12-19 DIAGNOSIS — L76.82 POSTOPERATIVE COMPLICATION OF SKIN INVOLVING DRAINAGE FROM SURGICAL WOUND: ICD-10-CM

## 2024-12-19 LAB
ANION GAP SERPL CALC-SCNC: 10 MMOL/L (ref 10–20)
APPEARANCE UR: CLEAR
BASOPHILS # BLD AUTO: 0.03 X10*3/UL (ref 0–0.1)
BASOPHILS NFR BLD AUTO: 0.5 %
BILIRUB UR STRIP.AUTO-MCNC: NEGATIVE MG/DL
BUN SERPL-MCNC: 18 MG/DL (ref 6–23)
CALCIUM SERPL-MCNC: 9.1 MG/DL (ref 8.6–10.3)
CHLORIDE SERPL-SCNC: 100 MMOL/L (ref 98–107)
CO2 SERPL-SCNC: 29 MMOL/L (ref 21–32)
COLOR UR: ABNORMAL
CREAT SERPL-MCNC: 0.89 MG/DL (ref 0.5–1.3)
EGFRCR SERPLBLD CKD-EPI 2021: >90 ML/MIN/1.73M*2
EOSINOPHIL # BLD AUTO: 0.24 X10*3/UL (ref 0–0.7)
EOSINOPHIL NFR BLD AUTO: 3.8 %
ERYTHROCYTE [DISTWIDTH] IN BLOOD BY AUTOMATED COUNT: 13.2 % (ref 11.5–14.5)
GLUCOSE SERPL-MCNC: 112 MG/DL (ref 74–99)
GLUCOSE UR STRIP.AUTO-MCNC: NORMAL MG/DL
HCT VFR BLD AUTO: 37.6 % (ref 41–52)
HGB BLD-MCNC: 13 G/DL (ref 13.5–17.5)
HOLD SPECIMEN: NORMAL
HYALINE CASTS #/AREA URNS AUTO: ABNORMAL /LPF
IMM GRANULOCYTES # BLD AUTO: 0.03 X10*3/UL (ref 0–0.7)
IMM GRANULOCYTES NFR BLD AUTO: 0.5 % (ref 0–0.9)
KETONES UR STRIP.AUTO-MCNC: NEGATIVE MG/DL
LEUKOCYTE ESTERASE UR QL STRIP.AUTO: NEGATIVE
LYMPHOCYTES # BLD AUTO: 2.07 X10*3/UL (ref 1.2–4.8)
LYMPHOCYTES NFR BLD AUTO: 33 %
MCH RBC QN AUTO: 28.6 PG (ref 26–34)
MCHC RBC AUTO-ENTMCNC: 34.6 G/DL (ref 32–36)
MCV RBC AUTO: 83 FL (ref 80–100)
MONOCYTES # BLD AUTO: 0.65 X10*3/UL (ref 0.1–1)
MONOCYTES NFR BLD AUTO: 10.4 %
MUCOUS THREADS #/AREA URNS AUTO: ABNORMAL /LPF
NEUTROPHILS # BLD AUTO: 3.26 X10*3/UL (ref 1.2–7.7)
NEUTROPHILS NFR BLD AUTO: 51.8 %
NITRITE UR QL STRIP.AUTO: NEGATIVE
NRBC BLD-RTO: 0 /100 WBCS (ref 0–0)
PH UR STRIP.AUTO: 5 [PH]
PLATELET # BLD AUTO: 281 X10*3/UL (ref 150–450)
POTASSIUM SERPL-SCNC: 3 MMOL/L (ref 3.5–5.3)
PROT UR STRIP.AUTO-MCNC: NEGATIVE MG/DL
RBC # BLD AUTO: 4.55 X10*6/UL (ref 4.5–5.9)
RBC # UR STRIP.AUTO: ABNORMAL /UL
RBC #/AREA URNS AUTO: ABNORMAL /HPF
SODIUM SERPL-SCNC: 136 MMOL/L (ref 136–145)
SP GR UR STRIP.AUTO: 1.02
UROBILINOGEN UR STRIP.AUTO-MCNC: NORMAL MG/DL
WBC # BLD AUTO: 6.3 X10*3/UL (ref 4.4–11.3)
WBC #/AREA URNS AUTO: ABNORMAL /HPF

## 2024-12-19 PROCEDURE — 74176 CT ABD & PELVIS W/O CONTRAST: CPT

## 2024-12-19 PROCEDURE — 36415 COLL VENOUS BLD VENIPUNCTURE: CPT | Performed by: NURSE PRACTITIONER

## 2024-12-19 PROCEDURE — 99284 EMERGENCY DEPT VISIT MOD MDM: CPT | Mod: 25

## 2024-12-19 PROCEDURE — 96361 HYDRATE IV INFUSION ADD-ON: CPT

## 2024-12-19 PROCEDURE — 2500000004 HC RX 250 GENERAL PHARMACY W/ HCPCS (ALT 636 FOR OP/ED): Performed by: NURSE PRACTITIONER

## 2024-12-19 PROCEDURE — 81001 URINALYSIS AUTO W/SCOPE: CPT | Performed by: NURSE PRACTITIONER

## 2024-12-19 PROCEDURE — 74176 CT ABD & PELVIS W/O CONTRAST: CPT | Performed by: RADIOLOGY

## 2024-12-19 PROCEDURE — RXMED WILLOW AMBULATORY MEDICATION CHARGE

## 2024-12-19 PROCEDURE — 96374 THER/PROPH/DIAG INJ IV PUSH: CPT

## 2024-12-19 PROCEDURE — 80048 BASIC METABOLIC PNL TOTAL CA: CPT | Performed by: NURSE PRACTITIONER

## 2024-12-19 PROCEDURE — 85025 COMPLETE CBC W/AUTO DIFF WBC: CPT | Performed by: NURSE PRACTITIONER

## 2024-12-19 RX ORDER — KETOROLAC TROMETHAMINE 10 MG/1
10 TABLET, FILM COATED ORAL 4 TIMES DAILY
Qty: 20 TABLET | Refills: 0 | Status: SHIPPED | OUTPATIENT
Start: 2024-12-19 | End: 2024-12-24

## 2024-12-19 RX ORDER — KETOROLAC TROMETHAMINE 30 MG/ML
30 INJECTION, SOLUTION INTRAMUSCULAR; INTRAVENOUS ONCE
Status: COMPLETED | OUTPATIENT
Start: 2024-12-19 | End: 2024-12-19

## 2024-12-19 ASSESSMENT — PAIN DESCRIPTION - DESCRIPTORS: DESCRIPTORS: BURNING;STABBING;PRESSURE

## 2024-12-19 ASSESSMENT — LIFESTYLE VARIABLES
TOTAL SCORE: 0
HAVE YOU EVER FELT YOU SHOULD CUT DOWN ON YOUR DRINKING: NO
EVER HAD A DRINK FIRST THING IN THE MORNING TO STEADY YOUR NERVES TO GET RID OF A HANGOVER: NO
HAVE PEOPLE ANNOYED YOU BY CRITICIZING YOUR DRINKING: NO
EVER FELT BAD OR GUILTY ABOUT YOUR DRINKING: NO

## 2024-12-19 ASSESSMENT — PAIN DESCRIPTION - LOCATION: LOCATION: ABDOMEN

## 2024-12-19 ASSESSMENT — PAIN DESCRIPTION - ORIENTATION: ORIENTATION: LEFT

## 2024-12-19 ASSESSMENT — COLUMBIA-SUICIDE SEVERITY RATING SCALE - C-SSRS
6. HAVE YOU EVER DONE ANYTHING, STARTED TO DO ANYTHING, OR PREPARED TO DO ANYTHING TO END YOUR LIFE?: NO
1. IN THE PAST MONTH, HAVE YOU WISHED YOU WERE DEAD OR WISHED YOU COULD GO TO SLEEP AND NOT WAKE UP?: NO
2. HAVE YOU ACTUALLY HAD ANY THOUGHTS OF KILLING YOURSELF?: NO

## 2024-12-19 ASSESSMENT — PAIN SCALES - GENERAL
PAINLEVEL_OUTOF10: 7
PAINLEVEL_OUTOF10: 7
PAINLEVEL_OUTOF10: 0 - NO PAIN

## 2024-12-19 ASSESSMENT — PAIN DESCRIPTION - PAIN TYPE: TYPE: CHRONIC PAIN

## 2024-12-19 ASSESSMENT — PAIN - FUNCTIONAL ASSESSMENT: PAIN_FUNCTIONAL_ASSESSMENT: 0-10

## 2024-12-19 NOTE — ED TRIAGE NOTES
Pt. Arrived to the ED via private car for left side abdominal and back pain. Pt. Has a HX of kidney cancer on the left side. Pt. States he has had several surgery's to the left side an recently had his stents removed last week. Pt. Has also had multiple drains placed to remove the fluid from the area

## 2024-12-19 NOTE — ED PROVIDER NOTES
Chief Complaint   Patient presents with   • Abdominal Pain       HPI       44 year old male presents to the Emergency Department today complaining of left flank pain that he describes as pressure-like in nature, constant since having a partial nephrectomy in October, worse over the past 2-3 days, radiates to his left side, and varies in intensity. Reports to having multiple drains and a stent following surgery secondary to increased fluid around the kidney. States that symptoms feel similar to that episode. Denies any associated fever, chills, headache, neck pain, chest pain, shortness of breath, abdominal pain, nausea, vomiting, diarrhea, constipation, or urinary symptoms.       History provided by:  Patient             Patient History   Past Medical History:   Diagnosis Date   • Chronic headaches    • Depression    • Hypertension    • Obesity    • Sleep apnea      Past Surgical History:   Procedure Laterality Date   • ANAL FISTULOTOMY     • COLONOSCOPY     • NEPHRECTOMY       Family History   Problem Relation Name Age of Onset   • Hypertension Mother     • Hypertension Father     • Heart attack Father     • Breast cancer Sister       Social History     Tobacco Use   • Smoking status: Never   • Smokeless tobacco: Never   Vaping Use   • Vaping status: Never Used   Substance Use Topics   • Alcohol use: Not Currently     Comment: rare   • Drug use: Never           Physical Exam  Constitutional:       Appearance: Normal appearance.   HENT:      Head: Normocephalic.      Right Ear: External ear normal.      Left Ear: External ear normal.      Nose: Nose normal.      Mouth/Throat:      Mouth: Mucous membranes are moist.      Pharynx: Oropharynx is clear. No oropharyngeal exudate or posterior oropharyngeal erythema.   Eyes:      Conjunctiva/sclera: Conjunctivae normal.      Pupils: Pupils are equal, round, and reactive to light.   Cardiovascular:      Rate and Rhythm: Normal rate and regular rhythm.      Pulses:            Radial pulses are 3+ on the right side and 3+ on the left side.        Dorsalis pedis pulses are 3+ on the right side and 3+ on the left side.      Heart sounds: Normal heart sounds. No murmur heard.     No friction rub. No gallop.   Pulmonary:      Effort: Pulmonary effort is normal. No respiratory distress.      Breath sounds: Normal breath sounds. No wheezing, rhonchi or rales.   Abdominal:      General: Abdomen is flat. Bowel sounds are normal.      Palpations: Abdomen is soft.      Tenderness: There is no abdominal tenderness. There is no right CVA tenderness, left CVA tenderness, guarding or rebound. Negative signs include Molina's sign and McBurney's sign.   Musculoskeletal:         General: No swelling or deformity.      Cervical back: Full passive range of motion without pain.      Right lower leg: No edema.      Left lower leg: No edema.   Lymphadenopathy:      Cervical: No cervical adenopathy.   Skin:     Capillary Refill: Capillary refill takes less than 2 seconds.      Coloration: Skin is not jaundiced.      Findings: No rash.   Neurological:      General: No focal deficit present.      Mental Status: He is alert and oriented to person, place, and time. Mental status is at baseline.      Gait: Gait is intact.   Psychiatric:         Mood and Affect: Mood normal.         Behavior: Behavior is cooperative.         Labs Reviewed   CBC WITH AUTO DIFFERENTIAL - Abnormal       Result Value    WBC 6.3      nRBC 0.0      RBC 4.55      Hemoglobin 13.0 (*)     Hematocrit 37.6 (*)     MCV 83      MCH 28.6      MCHC 34.6      RDW 13.2      Platelets 281      Neutrophils % 51.8      Immature Granulocytes %, Automated 0.5      Lymphocytes % 33.0      Monocytes % 10.4      Eosinophils % 3.8      Basophils % 0.5      Neutrophils Absolute 3.26      Immature Granulocytes Absolute, Automated 0.03      Lymphocytes Absolute 2.07      Monocytes Absolute 0.65      Eosinophils Absolute 0.24      Basophils Absolute 0.03      BASIC METABOLIC PANEL - Abnormal    Glucose 112 (*)     Sodium 136      Potassium 3.0 (*)     Chloride 100      Bicarbonate 29      Anion Gap 10      Urea Nitrogen 18      Creatinine 0.89      eGFR >90      Calcium 9.1     URINALYSIS WITH REFLEX CULTURE AND MICROSCOPIC - Abnormal    Color, Urine Light-Yellow      Appearance, Urine Clear      Specific Gravity, Urine 1.018      pH, Urine 5.0      Protein, Urine NEGATIVE      Glucose, Urine Normal      Blood, Urine 0.03 (TRACE) (*)     Ketones, Urine NEGATIVE      Bilirubin, Urine NEGATIVE      Urobilinogen, Urine Normal      Nitrite, Urine NEGATIVE      Leukocyte Esterase, Urine NEGATIVE     URINALYSIS MICROSCOPIC WITH REFLEX CULTURE - Abnormal    WBC, Urine 1-5      RBC, Urine 1-2      Mucus, Urine FEW      Hyaline Casts, Urine OCCASIONAL (*)    URINALYSIS WITH REFLEX CULTURE AND MICROSCOPIC    Narrative:     The following orders were created for panel order Urinalysis with Reflex Culture and Microscopic.  Procedure                               Abnormality         Status                     ---------                               -----------         ------                     Urinalysis with Reflex C...[[id number]]  Abnormal            Final result               Extra Urine Gray Tube[[id number]]                            In process                   Please view results for these tests on the individual orders.   EXTRA URINE GRAY TUBE       CT abdomen pelvis wo IV contrast   Final Result   1. No CT evidence of acute abnormality in the abdomen or pelvis.   2. Interval resolution of the previously seen subcutaneous fluid   collection in the left posterolateral flank and removal of the left   lateral abdominal wall percutaneous pigtail catheter.   3. Linear scarring in the left posterior flank with slight   surrounding edema, which may be due to evolving reactive postsurgical   changes, however recommend correlation with physical exam to exclude    infection/inflammation. No residual or recurrent fluid collection in   this region.   4. Small linear hypodensity in the posteromedial left mid kidney may   represent evolving postsurgical scarring at the partial nephrectomy   site.             MACRO:   None        Signed by: Grace Davey 12/19/2024 3:13 PM   Dictation workstation:   [id number]               [facility]   ED Course as of 12/19/24 1553   Thu Dec 19, 2024   1517 Reviewed the CT results. Consulted Dr. Shannon, his urologist, for recommendations. [JZ]   1543 Dr. Shannon reviewed the CT scan. Approves prescription for Toradol. Will follow up with him as an outpatient.  [JZ]      ED Course User Index  [JZ] Vincent Shanks, APRN-CNP         Diagnoses as of 12/19/24 1553   Flank pain           Medical Decision Making  Patient was seen and evaluated by myself. Saline lock was established with labs drawn and results as above. Given 1 Liter of normal saline wide open over 1 hour. Given Toradol as well. After receiving the medication and IV fluids, reported feeling improved. Potassium was low at 3.0 and replenished. Blood counts, remainder of electrolytes, and kidney function were unremarkable. Urinalysis shows no signs of infection. CT scan of his abdomen and pelvis without contrast (per Dr. Shannon's approval) showed no CT evidence of acute abnormality in the abdomen or pelvis; interval resolution of the previously seen subcutaneous fluid collection in the left posterolateral flank and removal of the left lateral abdominal wall percutaneous pigtail catheter; linear scarring in the left posterior flank with slight surrounding edema, which may be due to evolving reactive postsurgical changes, however recommend correlation with physical exam to exclude infection/inflammation; no residual or recurrent fluid collection in this region; and small linear hypodensity in the posteromedial left mid kidney may represent evolving postsurgical scarring at the  partial nephrectomy site. Dr. Shannon reviewed the findings and feels that the patient can be safely discharged with outpatient follow up. Follow up with their doctor in 3 days. Return if worse in any way. Discharged in stable condition with computer instructions.    Diagnostic Impression:     1. Acute left flank pain    2. IV meds and fluids in ED     3. Prescription therapy                  Your medication list        ASK your doctor about these medications        Instructions Last Dose Given Next Dose Due   acetaminophen 325 mg tablet  Commonly known as: Tylenol      Take 3 tablets (975 mg) by mouth every 6 hours.       DULoxetine 30 mg DR capsule  Commonly known as: Cymbalta           DULoxetine 60 mg DR capsule  Commonly known as: Cymbalta           ERGOCALCIFEROL (VITAMIN D2) ORAL           hydroCHLOROthiazide 25 mg tablet  Commonly known as: HYDRODiuril           losartan 100 mg tablet  Commonly known as: Cozaar           sildenafil 50 mg tablet  Commonly known as: Viagra           topiramate 25 mg tablet  Commonly known as: Topamax                      Procedure  Procedures     Vincent Shanks, APRN-CNP  12/19/24 4109

## 2024-12-30 ENCOUNTER — APPOINTMENT (OUTPATIENT)
Dept: RADIOLOGY | Facility: CLINIC | Age: 44
End: 2024-12-30
Payer: COMMERCIAL

## 2025-01-31 ENCOUNTER — APPOINTMENT (OUTPATIENT)
Dept: UROLOGY | Facility: HOSPITAL | Age: 45
End: 2025-01-31
Payer: COMMERCIAL

## 2025-04-03 ENCOUNTER — HOSPITAL ENCOUNTER (OUTPATIENT)
Dept: RADIOLOGY | Facility: HOSPITAL | Age: 45
Discharge: HOME | End: 2025-04-03
Payer: COMMERCIAL

## 2025-04-03 ENCOUNTER — APPOINTMENT (OUTPATIENT)
Dept: RADIOLOGY | Facility: CLINIC | Age: 45
End: 2025-04-03
Payer: COMMERCIAL

## 2025-04-03 DIAGNOSIS — C64.2 MALIGNANT NEOPLASM OF LEFT KIDNEY (MULTI): ICD-10-CM

## 2025-04-03 LAB
CREAT SERPL-MCNC: 0.84 MG/DL (ref 0.6–1.3)
GFR SERPL CREATININE-BSD FRML MDRD: >90 ML/MIN/1.73M*2

## 2025-04-03 PROCEDURE — 71046 X-RAY EXAM CHEST 2 VIEWS: CPT

## 2025-04-03 PROCEDURE — 82565 ASSAY OF CREATININE: CPT

## 2025-04-03 PROCEDURE — 2550000001 HC RX 255 CONTRASTS: Performed by: UROLOGY

## 2025-04-03 PROCEDURE — 74170 CT ABD WO CNTRST FLWD CNTRST: CPT

## 2025-04-03 RX ADMIN — IOHEXOL 75 ML: 350 INJECTION, SOLUTION INTRAVENOUS at 13:16

## 2025-04-04 LAB
ANION GAP SERPL CALCULATED.4IONS-SCNC: 12 MMOL/L (CALC) (ref 7–17)
BUN SERPL-MCNC: 19 MG/DL (ref 7–25)
BUN/CREAT SERPL: ABNORMAL (CALC) (ref 6–22)
CALCIUM SERPL-MCNC: 9.4 MG/DL (ref 8.6–10.3)
CHLORIDE SERPL-SCNC: 98 MMOL/L (ref 98–110)
CO2 SERPL-SCNC: 26 MMOL/L (ref 20–32)
CREAT SERPL-MCNC: 1.01 MG/DL (ref 0.6–1.29)
EGFRCR SERPLBLD CKD-EPI 2021: 93 ML/MIN/1.73M2
GLUCOSE SERPL-MCNC: 83 MG/DL (ref 65–99)
POTASSIUM SERPL-SCNC: 3.2 MMOL/L (ref 3.5–5.3)
SODIUM SERPL-SCNC: 136 MMOL/L (ref 135–146)

## 2025-04-17 ENCOUNTER — APPOINTMENT (OUTPATIENT)
Dept: UROLOGY | Facility: HOSPITAL | Age: 45
End: 2025-04-17
Payer: COMMERCIAL

## 2025-05-01 ENCOUNTER — OFFICE VISIT (OUTPATIENT)
Dept: UROLOGY | Facility: HOSPITAL | Age: 45
End: 2025-05-01
Payer: COMMERCIAL

## 2025-05-01 DIAGNOSIS — C64.9 MALIGNANT NEOPLASM OF KIDNEY, UNSPECIFIED LATERALITY (MULTI): ICD-10-CM

## 2025-05-01 DIAGNOSIS — N28.89 LEFT RENAL MASS: Primary | ICD-10-CM

## 2025-05-01 PROCEDURE — 99214 OFFICE O/P EST MOD 30 MIN: CPT | Performed by: UROLOGY

## 2025-05-01 PROCEDURE — G2211 COMPLEX E/M VISIT ADD ON: HCPCS | Performed by: UROLOGY

## 2025-05-01 NOTE — PROGRESS NOTES
FUV    Last Visit: 12/5/24     HISTORY OF PRESENT ILLNESS:   Skyler Shelton is a 44 y.o. male who is being seen today for FUV s/p left open partial nephrectomy 10/21/24 for renal mass   -Path showing Renal cell carcinoma, clear-cell type, with cystic and degenerative changes   - abdominal US on 9/11/24 demonstrating a left 2.7 cm cystic lesion with possible peripheral solid component.   CXR, 4/3/25: negative  Renal CT, 4/3/25: No evidence of residual/recurrent neoplasm at the partial nephrectomy site. No hydronephrosis.   PAST MEDICAL HISTORY:  Past Medical History:   Diagnosis Date    Chronic headaches     Depression     Hypertension     Obesity     Sleep apnea    Presented to the ED 11/10/24 for severe leakage around the CRISTIAN drain around the incision site. Patient had left abdominal drain placed percutaneously with US Guidance and started draining a few blood clots and had some increased pain.  No fever or chills.  No nausea vomiting or diarrhea.  No history of trauma or injury. They put a stoma bag around the drain followed by stent and nicole placement to facilitate drainage which were performed 11/18. Drain was replaced by IR 11/22 with ~300cc output/day. UOP was good, nicole was removed and he passed a TOV. After nicole removal, patient c/o recurrent positional flank pain and swelling above the incision, but declined nicole replacement. He then underwent drain replacement with advancement into the subfascial space on 11/25.     PAST SURGICAL HISTORY:  Past Surgical History:   Procedure Laterality Date    ANAL FISTULOTOMY      COLONOSCOPY      NEPHRECTOMY          ALLERGIES:   Allergies   Allergen Reactions    Penicillins Rash and Unknown        MEDICATIONS:   Current Outpatient Medications   Medication Instructions    acetaminophen (TYLENOL) 975 mg, oral, Every 6 hours    DULoxetine (Cymbalta) 60 mg DR capsule 1 capsule, Daily (0630)    DULoxetine (CYMBALTA) 30 mg, Daily    ERGOCALCIFEROL, VITAMIN D2, ORAL 5,000  "Units, Daily    hydroCHLOROthiazide (HYDRODIURIL) 25 mg, Daily    losartan (COZAAR) 100 mg, Daily    sildenafil (Viagra) 50 mg tablet 1 tablet, As needed    topiramate (TOPAMAX) 25 mg, Daily        PHYSICAL EXAM:  There were no vitals taken for this visit.  Constitutional: Patient appears well-developed and well-nourished. No distress.    Pulmonary/Chest: Effort normal. No respiratory distress.   Abdominal: Soft, ND NT  : WNL  Musculoskeletal: Normal range of motion.    Neurological: Alert and oriented to person, place, and time.  Psychiatric: Normal mood and affect. Behavior is normal. Thought content normal.      Labs:  No results found for: \"TESTOSTERONE\"  No results found for: \"PSA\"  No components found for: \"CBC\"  Lab Results   Component Value Date    CREATININE 1.01 04/03/2025     No components found for: \"TESTOTMS\"  No results found for: \"TESTF\"    Imaging: CXR, 4/3/25: negative  Renal CT, 4/3/25: No evidence of residual/recurrent neoplasm at the partial nephrectomy site. No hydronephrosis.     Discussion: Patient is s/p left open partial nephrectomy 10/21/24 for renal mass. Patient notes his leaking has stopped now, denies any pain or swelling. Doing much better. Will get a CXR in 6 months and a renal CT in 1 year.     AUA:1, MANOLO:23  Assessment:      No diagnosis found.      FUV  Plan:   Follow up in 6 months with a  CXR prior. Will get a renal CT in 1 year.   All questions and concerns were addressed. Patient verbalizes understanding and has no other questions at this time.     Scribe Attestation  By signing my name below, I, Ludy Padilla , Scribe   attest that this documentation has been prepared under the direction and in the presence of Baldo Shannon MD.    "

## (undated) DEVICE — Device

## (undated) DEVICE — ADHESIVE, SKIN, DERMABOND ADVANCED, 15CM, PEN-STYLE

## (undated) DEVICE — CATHETER, URETERAL, OPEN END, 5 FR, 70 CM

## (undated) DEVICE — TOWELS 4-PK

## (undated) DEVICE — STAPLER, SKIN PROXIMATE, 35 WIDE

## (undated) DEVICE — SPONGE, DISSECTOR, PEANUT, 3/8, STERILE 5 FOAM HOLDER"

## (undated) DEVICE — MANIFOLD, 4 PORT NEPTUNE STANDARD

## (undated) DEVICE — SPONGE, GAUZE, XRAY DECT, 16 PLY, 4 X 4, W/MASTER DMT,STERILE

## (undated) DEVICE — CATHETER TRAY, SURESTEP, 16FR, URINE METER W/STATLOCK

## (undated) DEVICE — GUIDEWIRE, ULTRA TRACK, HYBRID, 0.035 IN X 150CM

## (undated) DEVICE — COVER, CART, 45 X 27 X 48 IN, CLEAR

## (undated) DEVICE — LOOP, VESSEL, MAXI, BLUE

## (undated) DEVICE — SUTURE, VICRYL, 2-0, 27 IN, CT-1, VIOLET

## (undated) DEVICE — SPONGE, LAP, XRAY DECT, 18IN X 18IN, W/LOOP, STERILE

## (undated) DEVICE — DRESSING, ADHESIVE, ISLAND, TELFA, 4 X 10 IN

## (undated) DEVICE — DRAPE, INCISE, ANTIMICROBIAL, IOBAN 2, LARGE, 17 X 23 IN, DISPOSABLE, STERILE

## (undated) DEVICE — COLLECTION BAG, FLUID, F/STERIS LOOP, STERILE

## (undated) DEVICE — SUTURE, SILK, 2-0, TIES, 12-30 IN, BLACK

## (undated) DEVICE — CLIPPER, SURGICAL BLADE ASSEMBLY, GENERAL PURPOSE, SINGLE USE

## (undated) DEVICE — TOWEL, OR, XRAY DETECT 5 PK, WHITE, 17X26, W/DMT TAG, ST

## (undated) DEVICE — SPONGE GAUZE, XRAY SC+RFID, 4X4 16 PLY, STERILE

## (undated) DEVICE — SUTURE, VICRYL, 0, 18 IN, UNDYED

## (undated) DEVICE — TOWEL, SURGICAL, NEURO, O/R, 16 X 26, BLUE, STERILE

## (undated) DEVICE — APPLICATOR, CHLORAPREP, W/ORANGE TINT, 26ML

## (undated) DEVICE — SPONGE, HEMOSTAT, SURGICEL FIBRILLAR, ABS, 4 X 4, LF

## (undated) DEVICE — GOWN, ASTOUND, L

## (undated) DEVICE — PILLOW, POLYFILL, NYLEX, 21 X 27 IN

## (undated) DEVICE — PROTECTOR, NERVE, ULNAR, PINK

## (undated) DEVICE — SUTURE, SILK, 0, 24 IN, SUTUPAK, BLACK

## (undated) DEVICE — SUTURE, PDS II, 1, 36 IN, CT-1, VIOLET

## (undated) DEVICE — SUTURE, VICRYL, 2-0, 27 IN, BR/SH 27, VIOLET

## (undated) DEVICE — EVACUATOR, WOUND, SUCTION, CLOSED, JACKSON-PRATT, 100 CC, SILICONE

## (undated) DEVICE — TOWEL, OR XRAY, RFID DETECT, WHITE, 17X26, STERILE

## (undated) DEVICE — STAPLER, ECHELON 3000, 45MM STD

## (undated) DEVICE — DRAIN, WOUND, FLAT, HUBLESS, FULL LENGTH PERFORATION, 10 MM X 20 CM, SILICONE

## (undated) DEVICE — DRAPE, SHEET, LAPAROTOMY, W/ISO-BAC, W/ARMBOARD COVERS, 98 X 122 IN, DISPOSABLE, LF, STERILE

## (undated) DEVICE — TUBING, SUCTION, CONNECTING, STERILE 0.25 X 120 IN., LF

## (undated) DEVICE — TIP, SUCTION, YANKAUER, FLEXIBLE